# Patient Record
Sex: FEMALE | Race: BLACK OR AFRICAN AMERICAN | NOT HISPANIC OR LATINO | Employment: OTHER | URBAN - METROPOLITAN AREA
[De-identification: names, ages, dates, MRNs, and addresses within clinical notes are randomized per-mention and may not be internally consistent; named-entity substitution may affect disease eponyms.]

---

## 2017-01-01 ENCOUNTER — HOSPITAL ENCOUNTER (INPATIENT)
Facility: HOSPITAL | Age: 72
LOS: 13 days | Discharge: NURSING, REHAB OR LONG-TERM CARE | DRG: 196 | End: 2017-10-26
Attending: INTERNAL MEDICINE | Admitting: INTERNAL MEDICINE
Payer: MEDICARE

## 2017-01-01 ENCOUNTER — SURGERY (OUTPATIENT)
Age: 72
End: 2017-01-01

## 2017-01-01 ENCOUNTER — ANESTHESIA EVENT (OUTPATIENT)
Dept: ENDOSCOPY | Facility: HOSPITAL | Age: 72
DRG: 196 | End: 2017-01-01
Payer: MEDICARE

## 2017-01-01 ENCOUNTER — ANESTHESIA (OUTPATIENT)
Dept: ENDOSCOPY | Facility: HOSPITAL | Age: 72
DRG: 196 | End: 2017-01-01
Payer: MEDICARE

## 2017-01-01 ENCOUNTER — PATIENT OUTREACH (OUTPATIENT)
Dept: ADMINISTRATIVE | Facility: CLINIC | Age: 72
End: 2017-01-01

## 2017-01-01 ENCOUNTER — PATIENT OUTREACH (OUTPATIENT)
Dept: ADMINISTRATIVE | Facility: CLINIC | Age: 72
End: 2017-01-01
Payer: MEDICARE

## 2017-01-01 ENCOUNTER — HOSPITAL ENCOUNTER (INPATIENT)
Facility: HOSPITAL | Age: 72
LOS: 2 days | Discharge: HOME HEALTH | DRG: 280 | End: 2017-06-02
Attending: INTERNAL MEDICINE | Admitting: INTERNAL MEDICINE
Payer: MEDICARE

## 2017-01-01 ENCOUNTER — CLINICAL SUPPORT (OUTPATIENT)
Dept: CARDIOLOGY | Facility: CLINIC | Age: 72
DRG: 280 | End: 2017-01-01
Attending: INTERNAL MEDICINE
Payer: MEDICARE

## 2017-01-01 ENCOUNTER — HOSPITAL ENCOUNTER (EMERGENCY)
Facility: HOSPITAL | Age: 72
End: 2017-10-27
Attending: INTERNAL MEDICINE | Admitting: INTERNAL MEDICINE
Payer: MEDICARE

## 2017-01-01 VITALS
OXYGEN SATURATION: 99 % | SYSTOLIC BLOOD PRESSURE: 93 MMHG | RESPIRATION RATE: 30 BRPM | WEIGHT: 220.44 LBS | HEART RATE: 36 BPM | BODY MASS INDEX: 36.69 KG/M2 | DIASTOLIC BLOOD PRESSURE: 52 MMHG

## 2017-01-01 VITALS
HEART RATE: 78 BPM | DIASTOLIC BLOOD PRESSURE: 56 MMHG | WEIGHT: 188.5 LBS | SYSTOLIC BLOOD PRESSURE: 127 MMHG | HEIGHT: 65 IN | RESPIRATION RATE: 27 BRPM | OXYGEN SATURATION: 95 % | BODY MASS INDEX: 31.4 KG/M2 | TEMPERATURE: 99 F

## 2017-01-01 VITALS
SYSTOLIC BLOOD PRESSURE: 155 MMHG | WEIGHT: 189.81 LBS | OXYGEN SATURATION: 95 % | BODY MASS INDEX: 31.63 KG/M2 | HEIGHT: 65 IN | TEMPERATURE: 98 F | RESPIRATION RATE: 42 BRPM | HEART RATE: 75 BPM | DIASTOLIC BLOOD PRESSURE: 68 MMHG

## 2017-01-01 DIAGNOSIS — I10 ESSENTIAL HYPERTENSION: ICD-10-CM

## 2017-01-01 DIAGNOSIS — K92.1 MELENA: Primary | ICD-10-CM

## 2017-01-01 DIAGNOSIS — I25.10 CAD (CORONARY ARTERY DISEASE): Primary | ICD-10-CM

## 2017-01-01 DIAGNOSIS — Z79.4 TYPE 2 DIABETES MELLITUS WITH STAGE 3 CHRONIC KIDNEY DISEASE, WITH LONG-TERM CURRENT USE OF INSULIN: ICD-10-CM

## 2017-01-01 DIAGNOSIS — J96.90 RESPIRATORY FAILURE: ICD-10-CM

## 2017-01-01 DIAGNOSIS — R33.9 URINARY RETENTION: ICD-10-CM

## 2017-01-01 DIAGNOSIS — I46.9 CARDIAC ARREST: ICD-10-CM

## 2017-01-01 DIAGNOSIS — Z79.4 TYPE 2 DIABETES MELLITUS WITH HYPERGLYCEMIA, WITH LONG-TERM CURRENT USE OF INSULIN: ICD-10-CM

## 2017-01-01 DIAGNOSIS — J96.01 ACUTE RESPIRATORY FAILURE WITH HYPOXIA: ICD-10-CM

## 2017-01-01 DIAGNOSIS — E11.01 HYPERGLYCEMIC HYPEROSMOLAR NONKETOTIC COMA: ICD-10-CM

## 2017-01-01 DIAGNOSIS — I25.10 CORONARY ARTERY DISEASE INVOLVING NATIVE CORONARY ARTERY OF NATIVE HEART WITHOUT ANGINA PECTORIS: ICD-10-CM

## 2017-01-01 DIAGNOSIS — D50.9 MICROCYTIC ANEMIA: ICD-10-CM

## 2017-01-01 DIAGNOSIS — R79.89 ELEVATED TROPONIN: ICD-10-CM

## 2017-01-01 DIAGNOSIS — J96.21 ACUTE ON CHRONIC RESPIRATORY FAILURE WITH HYPOXIA: ICD-10-CM

## 2017-01-01 DIAGNOSIS — N18.30 TYPE 2 DIABETES MELLITUS WITH STAGE 3 CHRONIC KIDNEY DISEASE, WITH LONG-TERM CURRENT USE OF INSULIN: ICD-10-CM

## 2017-01-01 DIAGNOSIS — R07.9 CHEST PAIN: ICD-10-CM

## 2017-01-01 DIAGNOSIS — I20.89 ANGINAL CHEST PAIN AT REST: ICD-10-CM

## 2017-01-01 DIAGNOSIS — K92.0 HEMATEMESIS, PRESENCE OF NAUSEA NOT SPECIFIED: Primary | ICD-10-CM

## 2017-01-01 DIAGNOSIS — E11.65 TYPE 2 DIABETES MELLITUS WITH HYPERGLYCEMIA, WITH LONG-TERM CURRENT USE OF INSULIN: ICD-10-CM

## 2017-01-01 DIAGNOSIS — E78.5 HYPERLIPIDEMIA, UNSPECIFIED HYPERLIPIDEMIA TYPE: ICD-10-CM

## 2017-01-01 DIAGNOSIS — I25.10 CAD (CORONARY ARTERY DISEASE): ICD-10-CM

## 2017-01-01 DIAGNOSIS — E11.22 TYPE 2 DIABETES MELLITUS WITH STAGE 3 CHRONIC KIDNEY DISEASE, WITH LONG-TERM CURRENT USE OF INSULIN: ICD-10-CM

## 2017-01-01 DIAGNOSIS — R07.89 CHEST HEAVINESS: ICD-10-CM

## 2017-01-01 DIAGNOSIS — R10.13 EPIGASTRIC CRAMPING: ICD-10-CM

## 2017-01-01 DIAGNOSIS — I50.33 ACUTE ON CHRONIC DIASTOLIC HEART FAILURE: ICD-10-CM

## 2017-01-01 DIAGNOSIS — G93.41 ACUTE METABOLIC ENCEPHALOPATHY: ICD-10-CM

## 2017-01-01 DIAGNOSIS — R09.2 RESPIRATORY ARREST: ICD-10-CM

## 2017-01-01 LAB
ABO + RH BLD: NORMAL
ABO + RH BLD: NORMAL
ALBUMIN SERPL BCP-MCNC: 2.1 G/DL
ALBUMIN SERPL BCP-MCNC: 2.2 G/DL
ALBUMIN SERPL BCP-MCNC: 2.3 G/DL
ALBUMIN SERPL BCP-MCNC: 2.8 G/DL
ALBUMIN SERPL BCP-MCNC: 2.9 G/DL
ALBUMIN SERPL BCP-MCNC: 2.9 G/DL
ALBUMIN SERPL BCP-MCNC: 3 G/DL
ALLENS TEST: ABNORMAL
ALP SERPL-CCNC: 101 U/L
ALP SERPL-CCNC: 114 U/L
ALP SERPL-CCNC: 115 U/L
ALP SERPL-CCNC: 260 U/L
ALP SERPL-CCNC: 260 U/L
ALP SERPL-CCNC: 272 U/L
ALP SERPL-CCNC: 273 U/L
ALP SERPL-CCNC: 290 U/L
ALP SERPL-CCNC: 291 U/L
ALP SERPL-CCNC: 293 U/L
ALP SERPL-CCNC: 303 U/L
ALP SERPL-CCNC: 315 U/L
ALT SERPL W/O P-5'-P-CCNC: 18 U/L
ALT SERPL W/O P-5'-P-CCNC: 21 U/L
ALT SERPL W/O P-5'-P-CCNC: 32 U/L
ALT SERPL W/O P-5'-P-CCNC: 33 U/L
ALT SERPL W/O P-5'-P-CCNC: 34 U/L
ALT SERPL W/O P-5'-P-CCNC: 35 U/L
ALT SERPL W/O P-5'-P-CCNC: 36 U/L
ALT SERPL W/O P-5'-P-CCNC: 38 U/L
ALT SERPL W/O P-5'-P-CCNC: 49 U/L
ALT SERPL W/O P-5'-P-CCNC: 52 U/L
ALT SERPL W/O P-5'-P-CCNC: 61 U/L
ALT SERPL W/O P-5'-P-CCNC: 62 U/L
ANION GAP SERPL CALC-SCNC: 10 MMOL/L
ANION GAP SERPL CALC-SCNC: 10 MMOL/L
ANION GAP SERPL CALC-SCNC: 11 MMOL/L
ANION GAP SERPL CALC-SCNC: 11 MMOL/L
ANION GAP SERPL CALC-SCNC: 12 MMOL/L
ANION GAP SERPL CALC-SCNC: 13 MMOL/L
ANION GAP SERPL CALC-SCNC: 14 MMOL/L
ANION GAP SERPL CALC-SCNC: 15 MMOL/L
ANION GAP SERPL CALC-SCNC: 16 MMOL/L
ANION GAP SERPL CALC-SCNC: 17 MMOL/L
ANION GAP SERPL CALC-SCNC: 18 MMOL/L
ANION GAP SERPL CALC-SCNC: 19 MMOL/L
ANION GAP SERPL CALC-SCNC: 7 MMOL/L
ANION GAP SERPL CALC-SCNC: 7 MMOL/L
ANION GAP SERPL CALC-SCNC: 8 MMOL/L
ANION GAP SERPL CALC-SCNC: 9 MMOL/L
ANION GAP SERPL CALC-SCNC: 9 MMOL/L
ANISOCYTOSIS BLD QL SMEAR: SLIGHT
APTT BLDCRRT: 25.3 SEC
AST SERPL-CCNC: 18 U/L
AST SERPL-CCNC: 20 U/L
AST SERPL-CCNC: 26 U/L
AST SERPL-CCNC: 30 U/L
AST SERPL-CCNC: 32 U/L
AST SERPL-CCNC: 42 U/L
AST SERPL-CCNC: 43 U/L
AST SERPL-CCNC: 49 U/L
AST SERPL-CCNC: 49 U/L
AST SERPL-CCNC: 57 U/L
AST SERPL-CCNC: 57 U/L
AST SERPL-CCNC: 73 U/L
B-OH-BUTYR BLD STRIP-SCNC: 0 MMOL/L
B-OH-BUTYR BLD STRIP-SCNC: 0 MMOL/L
BACTERIA #/AREA URNS AUTO: ABNORMAL /HPF
BACTERIA BLD CULT: NORMAL
BACTERIA BLD CULT: NORMAL
BACTERIA SPEC AEROBE CULT: NORMAL
BASOPHILS # BLD AUTO: 0.01 K/UL
BASOPHILS # BLD AUTO: 0.02 K/UL
BASOPHILS # BLD AUTO: 0.03 K/UL
BASOPHILS # BLD AUTO: 0.04 K/UL
BASOPHILS # BLD AUTO: 0.05 K/UL
BASOPHILS # BLD AUTO: ABNORMAL K/UL
BASOPHILS NFR BLD: 0 %
BASOPHILS NFR BLD: 0.1 %
BASOPHILS NFR BLD: 0.2 %
BILIRUB SERPL-MCNC: 0.2 MG/DL
BILIRUB SERPL-MCNC: 0.3 MG/DL
BILIRUB SERPL-MCNC: 0.4 MG/DL
BILIRUB SERPL-MCNC: 0.5 MG/DL
BILIRUB SERPL-MCNC: 0.5 MG/DL
BILIRUB SERPL-MCNC: 0.6 MG/DL
BILIRUB SERPL-MCNC: 0.7 MG/DL
BILIRUB SERPL-MCNC: 0.9 MG/DL
BILIRUB SERPL-MCNC: 1.1 MG/DL
BILIRUB SERPL-MCNC: 1.1 MG/DL
BILIRUB SERPL-MCNC: 1.3 MG/DL
BILIRUB SERPL-MCNC: 1.4 MG/DL
BILIRUB UR QL STRIP: NEGATIVE
BLD GP AB SCN CELLS X3 SERPL QL: NORMAL
BLD GP AB SCN CELLS X3 SERPL QL: NORMAL
BLD PROD TYP BPU: NORMAL
BLOOD UNIT EXPIRATION DATE: NORMAL
BLOOD UNIT TYPE CODE: 5100
BLOOD UNIT TYPE CODE: 6200
BLOOD UNIT TYPE CODE: 8400
BLOOD UNIT TYPE CODE: 8400
BLOOD UNIT TYPE CODE: 9500
BLOOD UNIT TYPE: NORMAL
BNP SERPL-MCNC: 1943 PG/ML
BNP SERPL-MCNC: 243 PG/ML
BUN SERPL-MCNC: 100 MG/DL
BUN SERPL-MCNC: 107 MG/DL
BUN SERPL-MCNC: 111 MG/DL
BUN SERPL-MCNC: 111 MG/DL
BUN SERPL-MCNC: 112 MG/DL
BUN SERPL-MCNC: 113 MG/DL
BUN SERPL-MCNC: 40 MG/DL
BUN SERPL-MCNC: 45 MG/DL
BUN SERPL-MCNC: 50 MG/DL
BUN SERPL-MCNC: 51 MG/DL
BUN SERPL-MCNC: 52 MG/DL
BUN SERPL-MCNC: 53 MG/DL
BUN SERPL-MCNC: 55 MG/DL
BUN SERPL-MCNC: 59 MG/DL
BUN SERPL-MCNC: 59 MG/DL
BUN SERPL-MCNC: 60 MG/DL
BUN SERPL-MCNC: 61 MG/DL
BUN SERPL-MCNC: 61 MG/DL
BUN SERPL-MCNC: 63 MG/DL
BUN SERPL-MCNC: 69 MG/DL
BUN SERPL-MCNC: 70 MG/DL
BUN SERPL-MCNC: 74 MG/DL
BUN SERPL-MCNC: 78 MG/DL
BUN SERPL-MCNC: 79 MG/DL
BUN SERPL-MCNC: 81 MG/DL
BUN SERPL-MCNC: 82 MG/DL
BUN SERPL-MCNC: 86 MG/DL
BUN SERPL-MCNC: 88 MG/DL
BUN SERPL-MCNC: 91 MG/DL
BUN SERPL-MCNC: 92 MG/DL
BUN SERPL-MCNC: 95 MG/DL
CALCIUM SERPL-MCNC: 10 MG/DL
CALCIUM SERPL-MCNC: 10.1 MG/DL
CALCIUM SERPL-MCNC: 10.1 MG/DL
CALCIUM SERPL-MCNC: 10.2 MG/DL
CALCIUM SERPL-MCNC: 10.3 MG/DL
CALCIUM SERPL-MCNC: 10.4 MG/DL
CALCIUM SERPL-MCNC: 10.4 MG/DL
CALCIUM SERPL-MCNC: 10.6 MG/DL
CALCIUM SERPL-MCNC: 7.7 MG/DL
CALCIUM SERPL-MCNC: 7.8 MG/DL
CALCIUM SERPL-MCNC: 7.9 MG/DL
CALCIUM SERPL-MCNC: 8 MG/DL
CALCIUM SERPL-MCNC: 8.1 MG/DL
CALCIUM SERPL-MCNC: 8.1 MG/DL
CALCIUM SERPL-MCNC: 8.4 MG/DL
CALCIUM SERPL-MCNC: 8.4 MG/DL
CALCIUM SERPL-MCNC: 8.5 MG/DL
CALCIUM SERPL-MCNC: 8.6 MG/DL
CALCIUM SERPL-MCNC: 8.8 MG/DL
CALCIUM SERPL-MCNC: 9 MG/DL
CALCIUM SERPL-MCNC: 9.3 MG/DL
CALCIUM SERPL-MCNC: 9.4 MG/DL
CALCIUM SERPL-MCNC: 9.6 MG/DL
CALCIUM SERPL-MCNC: 9.6 MG/DL
CALCIUM SERPL-MCNC: 9.8 MG/DL
CALCIUM SERPL-MCNC: 9.8 MG/DL
CHLORIDE SERPL-SCNC: 100 MMOL/L
CHLORIDE SERPL-SCNC: 101 MMOL/L
CHLORIDE SERPL-SCNC: 102 MMOL/L
CHLORIDE SERPL-SCNC: 103 MMOL/L
CHLORIDE SERPL-SCNC: 104 MMOL/L
CHLORIDE SERPL-SCNC: 105 MMOL/L
CHLORIDE SERPL-SCNC: 106 MMOL/L
CHLORIDE SERPL-SCNC: 107 MMOL/L
CHLORIDE SERPL-SCNC: 108 MMOL/L
CHLORIDE SERPL-SCNC: 108 MMOL/L
CHLORIDE SERPL-SCNC: 109 MMOL/L
CHLORIDE SERPL-SCNC: 111 MMOL/L
CHLORIDE SERPL-SCNC: 112 MMOL/L
CHLORIDE SERPL-SCNC: 112 MMOL/L
CHLORIDE SERPL-SCNC: 113 MMOL/L
CHLORIDE SERPL-SCNC: 98 MMOL/L
CHOLEST/HDLC SERPL: 2.3 {RATIO}
CLARITY UR REFRACT.AUTO: ABNORMAL
CO2 SERPL-SCNC: 21 MMOL/L
CO2 SERPL-SCNC: 21 MMOL/L
CO2 SERPL-SCNC: 23 MMOL/L
CO2 SERPL-SCNC: 24 MMOL/L
CO2 SERPL-SCNC: 25 MMOL/L
CO2 SERPL-SCNC: 26 MMOL/L
CO2 SERPL-SCNC: 27 MMOL/L
CO2 SERPL-SCNC: 28 MMOL/L
CO2 SERPL-SCNC: 29 MMOL/L
CO2 SERPL-SCNC: 30 MMOL/L
CO2 SERPL-SCNC: 31 MMOL/L
CO2 SERPL-SCNC: 31 MMOL/L
CO2 SERPL-SCNC: 32 MMOL/L
CO2 SERPL-SCNC: 32 MMOL/L
CODING SYSTEM: NORMAL
COLOR UR AUTO: YELLOW
CREAT SERPL-MCNC: 1.6 MG/DL
CREAT SERPL-MCNC: 1.7 MG/DL
CREAT SERPL-MCNC: 1.8 MG/DL
CREAT SERPL-MCNC: 1.9 MG/DL
CREAT SERPL-MCNC: 2 MG/DL
CREAT SERPL-MCNC: 2.1 MG/DL
CREAT SERPL-MCNC: 2.2 MG/DL
CREAT SERPL-MCNC: 2.3 MG/DL
CREAT SERPL-MCNC: 2.4 MG/DL
CREAT SERPL-MCNC: 2.5 MG/DL
CREAT SERPL-MCNC: 2.5 MG/DL
CREAT SERPL-MCNC: 2.6 MG/DL
CREAT SERPL-MCNC: 2.8 MG/DL
CREAT SERPL-MCNC: 2.8 MG/DL
CREAT SERPL-MCNC: 2.9 MG/DL
CREAT SERPL-MCNC: 3 MG/DL
CREAT SERPL-MCNC: 3.1 MG/DL
CREAT UR-MCNC: 64 MG/DL
CRP SERPL-MCNC: 358.57 MG/L
DELSYS: ABNORMAL
DIASTOLIC DYSFUNCTION: YES
DIFFERENTIAL METHOD: ABNORMAL
DISPENSE STATUS: NORMAL
EOSINOPHIL # BLD AUTO: 0 K/UL
EOSINOPHIL # BLD AUTO: 0.2 K/UL
EOSINOPHIL # BLD AUTO: 0.4 K/UL
EOSINOPHIL # BLD AUTO: 1.2 K/UL
EOSINOPHIL # BLD AUTO: ABNORMAL K/UL
EOSINOPHIL NFR BLD: 0 %
EOSINOPHIL NFR BLD: 0.1 %
EOSINOPHIL NFR BLD: 1.1 %
EOSINOPHIL NFR BLD: 1.4 %
EOSINOPHIL NFR BLD: 1.5 %
EOSINOPHIL NFR BLD: 2.3 %
EOSINOPHIL NFR BLD: 3 %
EOSINOPHIL NFR BLD: 7.2 %
EP: 5
ERYTHROCYTE [DISTWIDTH] IN BLOOD BY AUTOMATED COUNT: 14.3 %
ERYTHROCYTE [DISTWIDTH] IN BLOOD BY AUTOMATED COUNT: 14.4 %
ERYTHROCYTE [DISTWIDTH] IN BLOOD BY AUTOMATED COUNT: 14.8 %
ERYTHROCYTE [DISTWIDTH] IN BLOOD BY AUTOMATED COUNT: 17.1 %
ERYTHROCYTE [DISTWIDTH] IN BLOOD BY AUTOMATED COUNT: 17.5 %
ERYTHROCYTE [DISTWIDTH] IN BLOOD BY AUTOMATED COUNT: 17.6 %
ERYTHROCYTE [DISTWIDTH] IN BLOOD BY AUTOMATED COUNT: 17.7 %
ERYTHROCYTE [DISTWIDTH] IN BLOOD BY AUTOMATED COUNT: 17.7 %
ERYTHROCYTE [DISTWIDTH] IN BLOOD BY AUTOMATED COUNT: 17.8 %
ERYTHROCYTE [DISTWIDTH] IN BLOOD BY AUTOMATED COUNT: 17.9 %
ERYTHROCYTE [DISTWIDTH] IN BLOOD BY AUTOMATED COUNT: 18 %
ERYTHROCYTE [DISTWIDTH] IN BLOOD BY AUTOMATED COUNT: 18.1 %
ERYTHROCYTE [DISTWIDTH] IN BLOOD BY AUTOMATED COUNT: 18.1 %
ERYTHROCYTE [DISTWIDTH] IN BLOOD BY AUTOMATED COUNT: 18.4 %
ERYTHROCYTE [DISTWIDTH] IN BLOOD BY AUTOMATED COUNT: 18.4 %
ERYTHROCYTE [DISTWIDTH] IN BLOOD BY AUTOMATED COUNT: 18.5 %
ERYTHROCYTE [DISTWIDTH] IN BLOOD BY AUTOMATED COUNT: 18.6 %
ERYTHROCYTE [DISTWIDTH] IN BLOOD BY AUTOMATED COUNT: 19.9 %
ERYTHROCYTE [SEDIMENTATION RATE] IN BLOOD BY WESTERGREN METHOD: 12 MM/H
ERYTHROCYTE [SEDIMENTATION RATE] IN BLOOD BY WESTERGREN METHOD: 12 MM/H
ERYTHROCYTE [SEDIMENTATION RATE] IN BLOOD BY WESTERGREN METHOD: 16 MM/H
ERYTHROCYTE [SEDIMENTATION RATE] IN BLOOD BY WESTERGREN METHOD: 20 MM/H
ERYTHROCYTE [SEDIMENTATION RATE] IN BLOOD BY WESTERGREN METHOD: >150 MM/HR
EST. GFR  (AFRICAN AMERICAN): 16.8 ML/MIN/1.73 M^2
EST. GFR  (AFRICAN AMERICAN): 17.5 ML/MIN/1.73 M^2
EST. GFR  (AFRICAN AMERICAN): 18.2 ML/MIN/1.73 M^2
EST. GFR  (AFRICAN AMERICAN): 19 ML/MIN/1.73 M^2
EST. GFR  (AFRICAN AMERICAN): 19 ML/MIN/1.73 M^2
EST. GFR  (AFRICAN AMERICAN): 20.8 ML/MIN/1.73 M^2
EST. GFR  (AFRICAN AMERICAN): 21.8 ML/MIN/1.73 M^2
EST. GFR  (AFRICAN AMERICAN): 21.8 ML/MIN/1.73 M^2
EST. GFR  (AFRICAN AMERICAN): 22.9 ML/MIN/1.73 M^2
EST. GFR  (AFRICAN AMERICAN): 24.1 ML/MIN/1.73 M^2
EST. GFR  (AFRICAN AMERICAN): 25.4 ML/MIN/1.73 M^2
EST. GFR  (AFRICAN AMERICAN): 26.9 ML/MIN/1.73 M^2
EST. GFR  (AFRICAN AMERICAN): 28.5 ML/MIN/1.73 M^2
EST. GFR  (AFRICAN AMERICAN): 30.4 ML/MIN/1.73 M^2
EST. GFR  (AFRICAN AMERICAN): 32.4 ML/MIN/1.73 M^2
EST. GFR  (AFRICAN AMERICAN): 34.7 ML/MIN/1.73 M^2
EST. GFR  (AFRICAN AMERICAN): 37.4 ML/MIN/1.73 M^2
EST. GFR  (NON AFRICAN AMERICAN): 14.6 ML/MIN/1.73 M^2
EST. GFR  (NON AFRICAN AMERICAN): 15.2 ML/MIN/1.73 M^2
EST. GFR  (NON AFRICAN AMERICAN): 15.8 ML/MIN/1.73 M^2
EST. GFR  (NON AFRICAN AMERICAN): 16.5 ML/MIN/1.73 M^2
EST. GFR  (NON AFRICAN AMERICAN): 16.5 ML/MIN/1.73 M^2
EST. GFR  (NON AFRICAN AMERICAN): 18 ML/MIN/1.73 M^2
EST. GFR  (NON AFRICAN AMERICAN): 18.9 ML/MIN/1.73 M^2
EST. GFR  (NON AFRICAN AMERICAN): 18.9 ML/MIN/1.73 M^2
EST. GFR  (NON AFRICAN AMERICAN): 19.9 ML/MIN/1.73 M^2
EST. GFR  (NON AFRICAN AMERICAN): 20.9 ML/MIN/1.73 M^2
EST. GFR  (NON AFRICAN AMERICAN): 22.1 ML/MIN/1.73 M^2
EST. GFR  (NON AFRICAN AMERICAN): 23.3 ML/MIN/1.73 M^2
EST. GFR  (NON AFRICAN AMERICAN): 24.8 ML/MIN/1.73 M^2
EST. GFR  (NON AFRICAN AMERICAN): 26.3 ML/MIN/1.73 M^2
EST. GFR  (NON AFRICAN AMERICAN): 28.1 ML/MIN/1.73 M^2
EST. GFR  (NON AFRICAN AMERICAN): 30.1 ML/MIN/1.73 M^2
EST. GFR  (NON AFRICAN AMERICAN): 32.4 ML/MIN/1.73 M^2
ESTIMATED AVG GLUCOSE: 151 MG/DL
ESTIMATED PA SYSTOLIC PRESSURE: 36
ESTIMATED PA SYSTOLIC PRESSURE: 52
FACT X PPP CHRO-ACNC: 1.66 IU/ML
FERRITIN SERPL-MCNC: 1334 NG/ML
FIO2: 100
FIO2: 100
FIO2: 50
FIO2: 90
GIANT PLATELETS BLD QL SMEAR: PRESENT
GLUCOSE SERPL-MCNC: 111 MG/DL
GLUCOSE SERPL-MCNC: 124 MG/DL
GLUCOSE SERPL-MCNC: 124 MG/DL
GLUCOSE SERPL-MCNC: 126 MG/DL
GLUCOSE SERPL-MCNC: 142 MG/DL
GLUCOSE SERPL-MCNC: 158 MG/DL
GLUCOSE SERPL-MCNC: 160 MG/DL
GLUCOSE SERPL-MCNC: 171 MG/DL
GLUCOSE SERPL-MCNC: 175 MG/DL
GLUCOSE SERPL-MCNC: 177 MG/DL
GLUCOSE SERPL-MCNC: 193 MG/DL
GLUCOSE SERPL-MCNC: 201 MG/DL
GLUCOSE SERPL-MCNC: 201 MG/DL
GLUCOSE SERPL-MCNC: 211 MG/DL
GLUCOSE SERPL-MCNC: 226 MG/DL
GLUCOSE SERPL-MCNC: 226 MG/DL
GLUCOSE SERPL-MCNC: 228 MG/DL
GLUCOSE SERPL-MCNC: 230 MG/DL
GLUCOSE SERPL-MCNC: 230 MG/DL
GLUCOSE SERPL-MCNC: 25 MG/DL
GLUCOSE SERPL-MCNC: 252 MG/DL
GLUCOSE SERPL-MCNC: 271 MG/DL
GLUCOSE SERPL-MCNC: 278 MG/DL
GLUCOSE SERPL-MCNC: 285 MG/DL
GLUCOSE SERPL-MCNC: 285 MG/DL
GLUCOSE SERPL-MCNC: 292 MG/DL
GLUCOSE SERPL-MCNC: 293 MG/DL
GLUCOSE SERPL-MCNC: 345 MG/DL (ref 70–110)
GLUCOSE SERPL-MCNC: 349 MG/DL
GLUCOSE SERPL-MCNC: 378 MG/DL
GLUCOSE SERPL-MCNC: 383 MG/DL
GLUCOSE SERPL-MCNC: 392 MG/DL
GLUCOSE SERPL-MCNC: 433 MG/DL
GLUCOSE SERPL-MCNC: 459 MG/DL
GLUCOSE SERPL-MCNC: 481 MG/DL
GLUCOSE SERPL-MCNC: 489 MG/DL
GLUCOSE SERPL-MCNC: 489 MG/DL
GLUCOSE SERPL-MCNC: 549 MG/DL
GLUCOSE SERPL-MCNC: 558 MG/DL
GLUCOSE UR QL STRIP: ABNORMAL
GRAM STN SPEC: NORMAL
HBA1C MFR BLD HPLC: 6.9 %
HCO3 UR-SCNC: 12.4 MMOL/L (ref 24–28)
HCO3 UR-SCNC: 16.5 MMOL/L (ref 24–28)
HCO3 UR-SCNC: 22.7 MMOL/L (ref 24–28)
HCO3 UR-SCNC: 24.6 MMOL/L (ref 24–28)
HCO3 UR-SCNC: 25.6 MMOL/L (ref 24–28)
HCT VFR BLD AUTO: 20.5 %
HCT VFR BLD AUTO: 21.8 %
HCT VFR BLD AUTO: 21.9 %
HCT VFR BLD AUTO: 22.6 %
HCT VFR BLD AUTO: 22.8 %
HCT VFR BLD AUTO: 23.3 %
HCT VFR BLD AUTO: 23.9 %
HCT VFR BLD AUTO: 24.4 %
HCT VFR BLD AUTO: 25.1 %
HCT VFR BLD AUTO: 25.1 %
HCT VFR BLD AUTO: 25.2 %
HCT VFR BLD AUTO: 25.6 %
HCT VFR BLD AUTO: 27.1 %
HCT VFR BLD AUTO: 28.1 %
HCT VFR BLD AUTO: 28.2 %
HCT VFR BLD AUTO: 28.3 %
HCT VFR BLD AUTO: 29.4 %
HCT VFR BLD AUTO: 30 %
HCT VFR BLD AUTO: 31.6 %
HCT VFR BLD AUTO: 32.2 %
HCT VFR BLD CALC: 17 %PCV (ref 36–54)
HDL/CHOLESTEROL RATIO: 43.8 %
HDLC SERPL-MCNC: 160 MG/DL
HDLC SERPL-MCNC: 70 MG/DL
HGB BLD-MCNC: 10.7 G/DL
HGB BLD-MCNC: 10.9 G/DL
HGB BLD-MCNC: 6.9 G/DL
HGB BLD-MCNC: 7.2 G/DL
HGB BLD-MCNC: 7.4 G/DL
HGB BLD-MCNC: 7.6 G/DL
HGB BLD-MCNC: 7.6 G/DL
HGB BLD-MCNC: 7.8 G/DL
HGB BLD-MCNC: 8.1 G/DL
HGB BLD-MCNC: 8.1 G/DL
HGB BLD-MCNC: 8.4 G/DL
HGB BLD-MCNC: 8.4 G/DL
HGB BLD-MCNC: 8.5 G/DL
HGB BLD-MCNC: 8.6 G/DL
HGB BLD-MCNC: 8.7 G/DL
HGB BLD-MCNC: 9.1 G/DL
HGB BLD-MCNC: 9.2 G/DL
HGB BLD-MCNC: 9.6 G/DL
HGB BLD-MCNC: 9.7 G/DL
HGB BLD-MCNC: 9.9 G/DL
HGB UR QL STRIP: ABNORMAL
HIV 1+2 AB+HIV1 P24 AG SERPL QL IA: NEGATIVE
HYALINE CASTS UR QL AUTO: 6 /LPF
HYPOCHROMIA BLD QL SMEAR: ABNORMAL
IMM GRANULOCYTES # BLD AUTO: 0.24 K/UL
IMM GRANULOCYTES # BLD AUTO: 0.31 K/UL
IMM GRANULOCYTES # BLD AUTO: 0.32 K/UL
IMM GRANULOCYTES # BLD AUTO: 0.35 K/UL
IMM GRANULOCYTES # BLD AUTO: 0.47 K/UL
IMM GRANULOCYTES # BLD AUTO: 0.58 K/UL
IMM GRANULOCYTES # BLD AUTO: 0.69 K/UL
IMM GRANULOCYTES # BLD AUTO: 0.71 K/UL
IMM GRANULOCYTES # BLD AUTO: 0.72 K/UL
IMM GRANULOCYTES # BLD AUTO: 0.76 K/UL
IMM GRANULOCYTES # BLD AUTO: ABNORMAL K/UL
IMM GRANULOCYTES NFR BLD AUTO: 1.1 %
IMM GRANULOCYTES NFR BLD AUTO: 1.4 %
IMM GRANULOCYTES NFR BLD AUTO: 1.6 %
IMM GRANULOCYTES NFR BLD AUTO: 2 %
IMM GRANULOCYTES NFR BLD AUTO: 2.8 %
IMM GRANULOCYTES NFR BLD AUTO: 2.8 %
IMM GRANULOCYTES NFR BLD AUTO: 2.9 %
IMM GRANULOCYTES NFR BLD AUTO: 3 %
IMM GRANULOCYTES NFR BLD AUTO: 3.1 %
IMM GRANULOCYTES NFR BLD AUTO: ABNORMAL %
INR PPP: 1
INR PPP: 1.1
INR PPP: 1.2
IP: 15
IRON SERPL-MCNC: 38 UG/DL
KETONES UR QL STRIP: NEGATIVE
L PNEUMO AG UR QL IA: NOT DETECTED
LACTATE SERPL-SCNC: 1.2 MMOL/L
LACTATE SERPL-SCNC: 1.4 MMOL/L
LACTATE SERPL-SCNC: 1.7 MMOL/L
LDLC SERPL CALC-MCNC: 72.6 MG/DL
LEUKOCYTE ESTERASE UR QL STRIP: ABNORMAL
LIPASE SERPL-CCNC: 10 U/L
LIPASE SERPL-CCNC: 19 U/L
LIPASE SERPL-CCNC: 4 U/L
LYMPHOCYTES # BLD AUTO: 0.9 K/UL
LYMPHOCYTES # BLD AUTO: 1 K/UL
LYMPHOCYTES # BLD AUTO: 1.4 K/UL
LYMPHOCYTES # BLD AUTO: 1.5 K/UL
LYMPHOCYTES # BLD AUTO: 1.7 K/UL
LYMPHOCYTES # BLD AUTO: 1.8 K/UL
LYMPHOCYTES # BLD AUTO: 1.8 K/UL
LYMPHOCYTES # BLD AUTO: 1.9 K/UL
LYMPHOCYTES # BLD AUTO: 1.9 K/UL
LYMPHOCYTES # BLD AUTO: 2 K/UL
LYMPHOCYTES # BLD AUTO: 2 K/UL
LYMPHOCYTES # BLD AUTO: 2.1 K/UL
LYMPHOCYTES # BLD AUTO: 2.1 K/UL
LYMPHOCYTES # BLD AUTO: 2.3 K/UL
LYMPHOCYTES # BLD AUTO: 2.3 K/UL
LYMPHOCYTES # BLD AUTO: 2.5 K/UL
LYMPHOCYTES # BLD AUTO: ABNORMAL K/UL
LYMPHOCYTES NFR BLD: 10.6 %
LYMPHOCYTES NFR BLD: 10.7 %
LYMPHOCYTES NFR BLD: 14.5 %
LYMPHOCYTES NFR BLD: 18.9 %
LYMPHOCYTES NFR BLD: 2 %
LYMPHOCYTES NFR BLD: 21.6 %
LYMPHOCYTES NFR BLD: 4.4 %
LYMPHOCYTES NFR BLD: 4.6 %
LYMPHOCYTES NFR BLD: 4.6 %
LYMPHOCYTES NFR BLD: 5.9 %
LYMPHOCYTES NFR BLD: 7.2 %
LYMPHOCYTES NFR BLD: 7.6 %
LYMPHOCYTES NFR BLD: 7.6 %
LYMPHOCYTES NFR BLD: 7.9 %
LYMPHOCYTES NFR BLD: 8.2 %
LYMPHOCYTES NFR BLD: 8.3 %
LYMPHOCYTES NFR BLD: 8.5 %
LYMPHOCYTES NFR BLD: 8.6 %
LYMPHOCYTES NFR BLD: 8.7 %
LYMPHOCYTES NFR BLD: 9.9 %
MAGNESIUM SERPL-MCNC: 1.7 MG/DL
MAGNESIUM SERPL-MCNC: 1.8 MG/DL
MAGNESIUM SERPL-MCNC: 1.9 MG/DL
MAGNESIUM SERPL-MCNC: 2.1 MG/DL
MAGNESIUM SERPL-MCNC: 2.2 MG/DL
MAGNESIUM SERPL-MCNC: 2.3 MG/DL
MAGNESIUM SERPL-MCNC: 2.3 MG/DL
MAGNESIUM SERPL-MCNC: 2.7 MG/DL
MCH RBC QN AUTO: 24.5 PG
MCH RBC QN AUTO: 24.7 PG
MCH RBC QN AUTO: 24.8 PG
MCH RBC QN AUTO: 25.2 PG
MCH RBC QN AUTO: 25.2 PG
MCH RBC QN AUTO: 25.3 PG
MCH RBC QN AUTO: 25.3 PG
MCH RBC QN AUTO: 25.4 PG
MCH RBC QN AUTO: 25.6 PG
MCH RBC QN AUTO: 25.6 PG
MCH RBC QN AUTO: 25.7 PG
MCH RBC QN AUTO: 25.7 PG
MCH RBC QN AUTO: 25.8 PG
MCH RBC QN AUTO: 25.8 PG
MCH RBC QN AUTO: 25.9 PG
MCH RBC QN AUTO: 26.3 PG
MCH RBC QN AUTO: 26.4 PG
MCH RBC QN AUTO: 26.9 PG
MCH RBC QN AUTO: 27 PG
MCH RBC QN AUTO: 27.2 PG
MCHC RBC AUTO-ENTMCNC: 31 G/DL
MCHC RBC AUTO-ENTMCNC: 32.3 G/DL
MCHC RBC AUTO-ENTMCNC: 32.7 G/DL
MCHC RBC AUTO-ENTMCNC: 32.7 G/DL
MCHC RBC AUTO-ENTMCNC: 33 %
MCHC RBC AUTO-ENTMCNC: 33 G/DL
MCHC RBC AUTO-ENTMCNC: 33.2 G/DL
MCHC RBC AUTO-ENTMCNC: 33.2 G/DL
MCHC RBC AUTO-ENTMCNC: 33.3 G/DL
MCHC RBC AUTO-ENTMCNC: 33.5 G/DL
MCHC RBC AUTO-ENTMCNC: 33.7 G/DL
MCHC RBC AUTO-ENTMCNC: 33.9 %
MCHC RBC AUTO-ENTMCNC: 33.9 %
MCHC RBC AUTO-ENTMCNC: 33.9 G/DL
MCHC RBC AUTO-ENTMCNC: 33.9 G/DL
MCHC RBC AUTO-ENTMCNC: 34.1 G/DL
MCHC RBC AUTO-ENTMCNC: 34.3 G/DL
MCHC RBC AUTO-ENTMCNC: 34.7 G/DL
MCV RBC AUTO: 74 FL
MCV RBC AUTO: 75 FL
MCV RBC AUTO: 76 FL
MCV RBC AUTO: 77 FL
MCV RBC AUTO: 78 FL
MCV RBC AUTO: 78 FL
MCV RBC AUTO: 80 FL
MCV RBC AUTO: 80 FL
MCV RBC AUTO: 81 FL
MCV RBC AUTO: 82 FL
MICROSCOPIC COMMENT: ABNORMAL
MITRAL VALVE MOBILITY: NORMAL
MITRAL VALVE REGURGITATION: ABNORMAL
MODE: ABNORMAL
MONOCYTES # BLD AUTO: 0.3 K/UL
MONOCYTES # BLD AUTO: 0.3 K/UL
MONOCYTES # BLD AUTO: 0.4 K/UL
MONOCYTES # BLD AUTO: 0.5 K/UL
MONOCYTES # BLD AUTO: 0.7 K/UL
MONOCYTES # BLD AUTO: 0.7 K/UL
MONOCYTES # BLD AUTO: 0.8 K/UL
MONOCYTES # BLD AUTO: 0.8 K/UL
MONOCYTES # BLD AUTO: 0.9 K/UL
MONOCYTES # BLD AUTO: 0.9 K/UL
MONOCYTES # BLD AUTO: 1 K/UL
MONOCYTES # BLD AUTO: 1 K/UL
MONOCYTES # BLD AUTO: 1.1 K/UL
MONOCYTES # BLD AUTO: 1.1 K/UL
MONOCYTES # BLD AUTO: 1.2 K/UL
MONOCYTES # BLD AUTO: 1.3 K/UL
MONOCYTES # BLD AUTO: 1.3 K/UL
MONOCYTES # BLD AUTO: 1.4 K/UL
MONOCYTES # BLD AUTO: 1.7 K/UL
MONOCYTES # BLD AUTO: ABNORMAL K/UL
MONOCYTES NFR BLD: 1 %
MONOCYTES NFR BLD: 10.3 %
MONOCYTES NFR BLD: 2 %
MONOCYTES NFR BLD: 2.7 %
MONOCYTES NFR BLD: 2.7 %
MONOCYTES NFR BLD: 3.6 %
MONOCYTES NFR BLD: 3.8 %
MONOCYTES NFR BLD: 3.9 %
MONOCYTES NFR BLD: 3.9 %
MONOCYTES NFR BLD: 4 %
MONOCYTES NFR BLD: 4.1 %
MONOCYTES NFR BLD: 4.6 %
MONOCYTES NFR BLD: 4.6 %
MONOCYTES NFR BLD: 4.7 %
MONOCYTES NFR BLD: 5.9 %
MONOCYTES NFR BLD: 6.2 %
MONOCYTES NFR BLD: 8.5 %
MONOCYTES NFR BLD: 9.5 %
MYELOCYTES NFR BLD MANUAL: 0.5 %
NEUTROPHILS # BLD AUTO: 11.1 K/UL
NEUTROPHILS # BLD AUTO: 11.4 K/UL
NEUTROPHILS # BLD AUTO: 11.7 K/UL
NEUTROPHILS # BLD AUTO: 13.3 K/UL
NEUTROPHILS # BLD AUTO: 18 K/UL
NEUTROPHILS # BLD AUTO: 18.3 K/UL
NEUTROPHILS # BLD AUTO: 18.5 K/UL
NEUTROPHILS # BLD AUTO: 18.6 K/UL
NEUTROPHILS # BLD AUTO: 19.1 K/UL
NEUTROPHILS # BLD AUTO: 19.9 K/UL
NEUTROPHILS # BLD AUTO: 19.9 K/UL
NEUTROPHILS # BLD AUTO: 20.1 K/UL
NEUTROPHILS # BLD AUTO: 20.2 K/UL
NEUTROPHILS # BLD AUTO: 20.3 K/UL
NEUTROPHILS # BLD AUTO: 21.6 K/UL
NEUTROPHILS # BLD AUTO: 22.3 K/UL
NEUTROPHILS # BLD AUTO: 7.6 K/UL
NEUTROPHILS # BLD AUTO: 9.7 K/UL
NEUTROPHILS # BLD AUTO: 9.8 K/UL
NEUTROPHILS NFR BLD: 71.3 %
NEUTROPHILS NFR BLD: 71.7 %
NEUTROPHILS NFR BLD: 72.4 %
NEUTROPHILS NFR BLD: 75.4 %
NEUTROPHILS NFR BLD: 80.6 %
NEUTROPHILS NFR BLD: 83.6 %
NEUTROPHILS NFR BLD: 83.7 %
NEUTROPHILS NFR BLD: 83.9 %
NEUTROPHILS NFR BLD: 84 %
NEUTROPHILS NFR BLD: 84.1 %
NEUTROPHILS NFR BLD: 84.6 %
NEUTROPHILS NFR BLD: 85.6 %
NEUTROPHILS NFR BLD: 86.3 %
NEUTROPHILS NFR BLD: 86.6 %
NEUTROPHILS NFR BLD: 87.1 %
NEUTROPHILS NFR BLD: 90.3 %
NEUTROPHILS NFR BLD: 90.7 %
NEUTROPHILS NFR BLD: 96.5 %
NITRITE UR QL STRIP: NEGATIVE
NONHDLC SERPL-MCNC: 90 MG/DL
NRBC BLD-RTO: 0 /100 WBC
NUM UNITS TRANS FFP: NORMAL
NUM UNITS TRANS FFP: NORMAL
NUM UNITS TRANS PACKED RBC: NORMAL
OSMOLALITY SERPL: 348 MOSM/KG
OVALOCYTES BLD QL SMEAR: ABNORMAL
PCO2 BLDA: 36.6 MMHG (ref 35–45)
PCO2 BLDA: 39.9 MMHG (ref 35–45)
PCO2 BLDA: 41.2 MMHG (ref 35–45)
PCO2 BLDA: 67.6 MMHG (ref 35–45)
PCO2 BLDA: 82.9 MMHG (ref 35–45)
PH SMN: 6.87 [PH] (ref 7.35–7.45)
PH SMN: 6.91 [PH] (ref 7.35–7.45)
PH SMN: 7.4 [PH] (ref 7.35–7.45)
PH UR STRIP: 5 [PH] (ref 5–8)
PHOSPHATE SERPL-MCNC: 1.6 MG/DL
PHOSPHATE SERPL-MCNC: 2.4 MG/DL
PHOSPHATE SERPL-MCNC: 2.6 MG/DL
PHOSPHATE SERPL-MCNC: 3.6 MG/DL
PHOSPHATE SERPL-MCNC: 3.8 MG/DL
PHOSPHATE SERPL-MCNC: 4.1 MG/DL
PHOSPHATE SERPL-MCNC: 4.2 MG/DL
PHOSPHATE SERPL-MCNC: 4.2 MG/DL
PHOSPHATE SERPL-MCNC: 5.4 MG/DL
PLATELET # BLD AUTO: 133 K/UL
PLATELET # BLD AUTO: 144 K/UL
PLATELET # BLD AUTO: 157 K/UL
PLATELET # BLD AUTO: 159 K/UL
PLATELET # BLD AUTO: 182 K/UL
PLATELET # BLD AUTO: 185 K/UL
PLATELET # BLD AUTO: 197 K/UL
PLATELET # BLD AUTO: 199 K/UL
PLATELET # BLD AUTO: 210 K/UL
PLATELET # BLD AUTO: 261 K/UL
PLATELET # BLD AUTO: 264 K/UL
PLATELET # BLD AUTO: 300 K/UL
PLATELET # BLD AUTO: 302 K/UL
PLATELET # BLD AUTO: 320 K/UL
PLATELET # BLD AUTO: 337 K/UL
PLATELET # BLD AUTO: 350 K/UL
PLATELET # BLD AUTO: 353 K/UL
PLATELET # BLD AUTO: 353 K/UL
PLATELET # BLD AUTO: 355 K/UL
PLATELET # BLD AUTO: 400 K/UL
PLATELET BLD QL SMEAR: ABNORMAL
PMV BLD AUTO: 10.2 FL
PMV BLD AUTO: 10.3 FL
PMV BLD AUTO: 10.7 FL
PMV BLD AUTO: 10.7 FL
PMV BLD AUTO: 10.8 FL
PMV BLD AUTO: 11.1 FL
PMV BLD AUTO: 11.1 FL
PMV BLD AUTO: 11.3 FL
PMV BLD AUTO: 11.3 FL
PMV BLD AUTO: 11.5 FL
PMV BLD AUTO: 11.6 FL
PMV BLD AUTO: 11.8 FL
PMV BLD AUTO: 8.2 FL
PMV BLD AUTO: 8.6 FL
PMV BLD AUTO: 8.7 FL
PMV BLD AUTO: 9.1 FL
PMV BLD AUTO: 9.1 FL
PMV BLD AUTO: 9.2 FL
PMV BLD AUTO: 9.4 FL
PMV BLD AUTO: 9.9 FL
PO2 BLDA: 231 MMHG (ref 80–100)
PO2 BLDA: 317 MMHG (ref 80–100)
PO2 BLDA: 45 MMHG (ref 80–100)
PO2 BLDA: 49 MMHG (ref 80–100)
PO2 BLDA: 51 MMHG (ref 80–100)
POC BE: -16 MMOL/L
POC BE: -2 MMOL/L
POC BE: -21 MMOL/L
POC BE: 0 MMOL/L
POC BE: 1 MMOL/L
POC IONIZED CALCIUM: 1.5 MMOL/L (ref 1.06–1.42)
POC SATURATED O2: 100 % (ref 95–100)
POC SATURATED O2: 100 % (ref 95–100)
POC SATURATED O2: 49 % (ref 95–100)
POC SATURATED O2: 57 % (ref 95–100)
POC SATURATED O2: 84 % (ref 95–100)
POC TCO2: 24 MMOL/L (ref 23–27)
POC TCO2: 26 MMOL/L (ref 23–27)
POC TCO2: 27 MMOL/L (ref 23–27)
POCT GLUCOSE: 100 MG/DL (ref 70–110)
POCT GLUCOSE: 101 MG/DL (ref 70–110)
POCT GLUCOSE: 114 MG/DL (ref 70–110)
POCT GLUCOSE: 122 MG/DL (ref 70–110)
POCT GLUCOSE: 125 MG/DL (ref 70–110)
POCT GLUCOSE: 128 MG/DL (ref 70–110)
POCT GLUCOSE: 131 MG/DL (ref 70–110)
POCT GLUCOSE: 135 MG/DL (ref 70–110)
POCT GLUCOSE: 141 MG/DL (ref 70–110)
POCT GLUCOSE: 143 MG/DL (ref 70–110)
POCT GLUCOSE: 147 MG/DL (ref 70–110)
POCT GLUCOSE: 149 MG/DL (ref 70–110)
POCT GLUCOSE: 153 MG/DL (ref 70–110)
POCT GLUCOSE: 155 MG/DL (ref 70–110)
POCT GLUCOSE: 157 MG/DL (ref 70–110)
POCT GLUCOSE: 161 MG/DL (ref 70–110)
POCT GLUCOSE: 162 MG/DL (ref 70–110)
POCT GLUCOSE: 167 MG/DL (ref 70–110)
POCT GLUCOSE: 169 MG/DL (ref 70–110)
POCT GLUCOSE: 171 MG/DL (ref 70–110)
POCT GLUCOSE: 173 MG/DL (ref 70–110)
POCT GLUCOSE: 174 MG/DL (ref 70–110)
POCT GLUCOSE: 178 MG/DL (ref 70–110)
POCT GLUCOSE: 181 MG/DL (ref 70–110)
POCT GLUCOSE: 183 MG/DL (ref 70–110)
POCT GLUCOSE: 183 MG/DL (ref 70–110)
POCT GLUCOSE: 185 MG/DL (ref 70–110)
POCT GLUCOSE: 187 MG/DL (ref 70–110)
POCT GLUCOSE: 190 MG/DL (ref 70–110)
POCT GLUCOSE: 191 MG/DL (ref 70–110)
POCT GLUCOSE: 193 MG/DL (ref 70–110)
POCT GLUCOSE: 194 MG/DL (ref 70–110)
POCT GLUCOSE: 194 MG/DL (ref 70–110)
POCT GLUCOSE: 195 MG/DL (ref 70–110)
POCT GLUCOSE: 197 MG/DL (ref 70–110)
POCT GLUCOSE: 201 MG/DL (ref 70–110)
POCT GLUCOSE: 204 MG/DL (ref 70–110)
POCT GLUCOSE: 205 MG/DL (ref 70–110)
POCT GLUCOSE: 211 MG/DL (ref 70–110)
POCT GLUCOSE: 212 MG/DL (ref 70–110)
POCT GLUCOSE: 213 MG/DL (ref 70–110)
POCT GLUCOSE: 217 MG/DL (ref 70–110)
POCT GLUCOSE: 220 MG/DL (ref 70–110)
POCT GLUCOSE: 228 MG/DL (ref 70–110)
POCT GLUCOSE: 231 MG/DL (ref 70–110)
POCT GLUCOSE: 234 MG/DL (ref 70–110)
POCT GLUCOSE: 237 MG/DL (ref 70–110)
POCT GLUCOSE: 240 MG/DL (ref 70–110)
POCT GLUCOSE: 242 MG/DL (ref 70–110)
POCT GLUCOSE: 244 MG/DL (ref 70–110)
POCT GLUCOSE: 246 MG/DL (ref 70–110)
POCT GLUCOSE: 250 MG/DL (ref 70–110)
POCT GLUCOSE: 251 MG/DL (ref 70–110)
POCT GLUCOSE: 251 MG/DL (ref 70–110)
POCT GLUCOSE: 254 MG/DL (ref 70–110)
POCT GLUCOSE: 255 MG/DL (ref 70–110)
POCT GLUCOSE: 256 MG/DL (ref 70–110)
POCT GLUCOSE: 258 MG/DL (ref 70–110)
POCT GLUCOSE: 259 MG/DL (ref 70–110)
POCT GLUCOSE: 259 MG/DL (ref 70–110)
POCT GLUCOSE: 26 MG/DL (ref 70–110)
POCT GLUCOSE: 264 MG/DL (ref 70–110)
POCT GLUCOSE: 265 MG/DL (ref 70–110)
POCT GLUCOSE: 265 MG/DL (ref 70–110)
POCT GLUCOSE: 27 MG/DL (ref 70–110)
POCT GLUCOSE: 276 MG/DL (ref 70–110)
POCT GLUCOSE: 276 MG/DL (ref 70–110)
POCT GLUCOSE: 277 MG/DL (ref 70–110)
POCT GLUCOSE: 280 MG/DL (ref 70–110)
POCT GLUCOSE: 284 MG/DL (ref 70–110)
POCT GLUCOSE: 296 MG/DL (ref 70–110)
POCT GLUCOSE: 299 MG/DL (ref 70–110)
POCT GLUCOSE: 302 MG/DL (ref 70–110)
POCT GLUCOSE: 305 MG/DL (ref 70–110)
POCT GLUCOSE: 316 MG/DL (ref 70–110)
POCT GLUCOSE: 317 MG/DL (ref 70–110)
POCT GLUCOSE: 317 MG/DL (ref 70–110)
POCT GLUCOSE: 318 MG/DL (ref 70–110)
POCT GLUCOSE: 318 MG/DL (ref 70–110)
POCT GLUCOSE: 323 MG/DL (ref 70–110)
POCT GLUCOSE: 324 MG/DL (ref 70–110)
POCT GLUCOSE: 333 MG/DL (ref 70–110)
POCT GLUCOSE: 340 MG/DL (ref 70–110)
POCT GLUCOSE: 342 MG/DL (ref 70–110)
POCT GLUCOSE: 348 MG/DL (ref 70–110)
POCT GLUCOSE: 357 MG/DL (ref 70–110)
POCT GLUCOSE: 365 MG/DL (ref 70–110)
POCT GLUCOSE: 368 MG/DL (ref 70–110)
POCT GLUCOSE: 376 MG/DL (ref 70–110)
POCT GLUCOSE: 385 MG/DL (ref 70–110)
POCT GLUCOSE: 388 MG/DL (ref 70–110)
POCT GLUCOSE: 389 MG/DL (ref 70–110)
POCT GLUCOSE: 391 MG/DL (ref 70–110)
POCT GLUCOSE: 393 MG/DL (ref 70–110)
POCT GLUCOSE: 397 MG/DL (ref 70–110)
POCT GLUCOSE: 397 MG/DL (ref 70–110)
POCT GLUCOSE: 400 MG/DL (ref 70–110)
POCT GLUCOSE: 400 MG/DL (ref 70–110)
POCT GLUCOSE: 403 MG/DL (ref 70–110)
POCT GLUCOSE: 404 MG/DL (ref 70–110)
POCT GLUCOSE: 407 MG/DL (ref 70–110)
POCT GLUCOSE: 408 MG/DL (ref 70–110)
POCT GLUCOSE: 410 MG/DL (ref 70–110)
POCT GLUCOSE: 410 MG/DL (ref 70–110)
POCT GLUCOSE: 411 MG/DL (ref 70–110)
POCT GLUCOSE: 413 MG/DL (ref 70–110)
POCT GLUCOSE: 422 MG/DL (ref 70–110)
POCT GLUCOSE: 427 MG/DL (ref 70–110)
POCT GLUCOSE: 431 MG/DL (ref 70–110)
POCT GLUCOSE: 432 MG/DL (ref 70–110)
POCT GLUCOSE: 434 MG/DL (ref 70–110)
POCT GLUCOSE: 437 MG/DL (ref 70–110)
POCT GLUCOSE: 437 MG/DL (ref 70–110)
POCT GLUCOSE: 438 MG/DL (ref 70–110)
POCT GLUCOSE: 440 MG/DL (ref 70–110)
POCT GLUCOSE: 441 MG/DL (ref 70–110)
POCT GLUCOSE: 445 MG/DL (ref 70–110)
POCT GLUCOSE: 455 MG/DL (ref 70–110)
POCT GLUCOSE: 486 MG/DL (ref 70–110)
POCT GLUCOSE: 62 MG/DL (ref 70–110)
POCT GLUCOSE: 63 MG/DL (ref 70–110)
POCT GLUCOSE: 79 MG/DL (ref 70–110)
POCT GLUCOSE: 80 MG/DL (ref 70–110)
POCT GLUCOSE: 90 MG/DL (ref 70–110)
POCT GLUCOSE: 91 MG/DL (ref 70–110)
POCT GLUCOSE: 93 MG/DL (ref 70–110)
POCT GLUCOSE: 93 MG/DL (ref 70–110)
POCT GLUCOSE: <20 MG/DL (ref 70–110)
POCT GLUCOSE: >500 MG/DL (ref 70–110)
POIKILOCYTOSIS BLD QL SMEAR: SLIGHT
POLYCHROMASIA BLD QL SMEAR: ABNORMAL
POTASSIUM BLD-SCNC: 5.7 MMOL/L (ref 3.5–5.1)
POTASSIUM SERPL-SCNC: 3.2 MMOL/L
POTASSIUM SERPL-SCNC: 3.2 MMOL/L
POTASSIUM SERPL-SCNC: 3.4 MMOL/L
POTASSIUM SERPL-SCNC: 3.5 MMOL/L
POTASSIUM SERPL-SCNC: 3.6 MMOL/L
POTASSIUM SERPL-SCNC: 3.7 MMOL/L
POTASSIUM SERPL-SCNC: 3.7 MMOL/L
POTASSIUM SERPL-SCNC: 3.8 MMOL/L
POTASSIUM SERPL-SCNC: 4 MMOL/L
POTASSIUM SERPL-SCNC: 4 MMOL/L
POTASSIUM SERPL-SCNC: 4.1 MMOL/L
POTASSIUM SERPL-SCNC: 4.2 MMOL/L
POTASSIUM SERPL-SCNC: 4.2 MMOL/L
POTASSIUM SERPL-SCNC: 4.3 MMOL/L
POTASSIUM SERPL-SCNC: 4.4 MMOL/L
POTASSIUM SERPL-SCNC: 4.5 MMOL/L
POTASSIUM SERPL-SCNC: 4.6 MMOL/L
POTASSIUM SERPL-SCNC: 4.6 MMOL/L
POTASSIUM SERPL-SCNC: 4.7 MMOL/L
POTASSIUM SERPL-SCNC: 5 MMOL/L
POTASSIUM SERPL-SCNC: 5 MMOL/L
POTASSIUM SERPL-SCNC: 5.1 MMOL/L
PROCALCITONIN SERPL IA-MCNC: 6.53 NG/ML
PROCALCITONIN SERPL IA-MCNC: 8.17 NG/ML
PROCALCITONIN SERPL IA-MCNC: 9.09 NG/ML
PROT SERPL-MCNC: 5.4 G/DL
PROT SERPL-MCNC: 6 G/DL
PROT SERPL-MCNC: 6.1 G/DL
PROT SERPL-MCNC: 6.4 G/DL
PROT SERPL-MCNC: 6.5 G/DL
PROT SERPL-MCNC: 6.8 G/DL
PROT SERPL-MCNC: 7 G/DL
PROT SERPL-MCNC: 7.3 G/DL
PROT SERPL-MCNC: 7.4 G/DL
PROT SERPL-MCNC: 7.5 G/DL
PROT SERPL-MCNC: 7.9 G/DL
PROT SERPL-MCNC: 7.9 G/DL
PROT UR QL STRIP: ABNORMAL
PROTHROMBIN TIME: 10.3 SEC
PROTHROMBIN TIME: 12 SEC
PROTHROMBIN TIME: 12.7 SEC
RBC # BLD AUTO: 2.66 M/UL
RBC # BLD AUTO: 2.81 M/UL
RBC # BLD AUTO: 2.94 M/UL
RBC # BLD AUTO: 2.96 M/UL
RBC # BLD AUTO: 2.97 M/UL
RBC # BLD AUTO: 3.08 M/UL
RBC # BLD AUTO: 3.08 M/UL
RBC # BLD AUTO: 3.25 M/UL
RBC # BLD AUTO: 3.25 M/UL
RBC # BLD AUTO: 3.27 M/UL
RBC # BLD AUTO: 3.34 M/UL
RBC # BLD AUTO: 3.34 M/UL
RBC # BLD AUTO: 3.45 M/UL
RBC # BLD AUTO: 3.63 M/UL
RBC # BLD AUTO: 3.68 M/UL
RBC # BLD AUTO: 3.92 M/UL
RBC # BLD AUTO: 3.94 M/UL
RBC # BLD AUTO: 4.04 M/UL
RBC #/AREA URNS AUTO: 15 /HPF (ref 0–4)
RETIRED EF AND QEF - SEE NOTES: 60 (ref 55–65)
RETIRED EF AND QEF - SEE NOTES: 60 (ref 55–65)
SAMPLE: ABNORMAL
SATURATED IRON: 19 %
SCHISTOCYTES BLD QL SMEAR: ABNORMAL
SCHISTOCYTES BLD QL SMEAR: PRESENT
SITE: ABNORMAL
SODIUM BLD-SCNC: 145 MMOL/L (ref 136–145)
SODIUM SERPL-SCNC: 135 MMOL/L
SODIUM SERPL-SCNC: 136 MMOL/L
SODIUM SERPL-SCNC: 136 MMOL/L
SODIUM SERPL-SCNC: 137 MMOL/L
SODIUM SERPL-SCNC: 137 MMOL/L
SODIUM SERPL-SCNC: 138 MMOL/L
SODIUM SERPL-SCNC: 139 MMOL/L
SODIUM SERPL-SCNC: 141 MMOL/L
SODIUM SERPL-SCNC: 142 MMOL/L
SODIUM SERPL-SCNC: 144 MMOL/L
SODIUM SERPL-SCNC: 145 MMOL/L
SODIUM SERPL-SCNC: 146 MMOL/L
SODIUM SERPL-SCNC: 147 MMOL/L
SODIUM SERPL-SCNC: 148 MMOL/L
SODIUM SERPL-SCNC: 150 MMOL/L
SODIUM SERPL-SCNC: 152 MMOL/L
SODIUM SERPL-SCNC: 153 MMOL/L
SODIUM SERPL-SCNC: 155 MMOL/L
SODIUM SERPL-SCNC: 155 MMOL/L
SODIUM SERPL-SCNC: 157 MMOL/L
SODIUM SERPL-SCNC: 158 MMOL/L
SODIUM SERPL-SCNC: 158 MMOL/L
SODIUM UR-SCNC: 27 MMOL/L
SP GR UR STRIP: 1.01 (ref 1–1.03)
SP02: 90
SP02: 99
SPHEROCYTES BLD QL SMEAR: ABNORMAL
SQUAMOUS #/AREA URNS AUTO: 2 /HPF
T4 FREE SERPL-MCNC: 0.82 NG/DL
T4 SERPL-MCNC: 4.5 UG/DL
TARGETS BLD QL SMEAR: ABNORMAL
TOTAL IRON BINDING CAPACITY: 204 UG/DL
TRANS ERYTHROCYTES VOL PATIENT: NORMAL ML
TRANS PLATPHERESIS VOL PATIENT: NORMAL ML
TRANSFERRIN SERPL-MCNC: 138 MG/DL
TRICUSPID VALVE REGURGITATION: ABNORMAL
TRICUSPID VALVE REGURGITATION: ABNORMAL
TRIGL SERPL-MCNC: 87 MG/DL
TROPONIN I SERPL DL<=0.01 NG/ML-MCNC: 0.11 NG/ML
TROPONIN I SERPL DL<=0.01 NG/ML-MCNC: 0.12 NG/ML
TROPONIN I SERPL DL<=0.01 NG/ML-MCNC: 0.12 NG/ML
TROPONIN I SERPL DL<=0.01 NG/ML-MCNC: 0.43 NG/ML
TROPONIN I SERPL DL<=0.01 NG/ML-MCNC: 0.47 NG/ML
TROPONIN I SERPL DL<=0.01 NG/ML-MCNC: 3.06 NG/ML
TSH SERPL DL<=0.005 MIU/L-ACNC: 0.23 UIU/ML
TSH SERPL DL<=0.005 MIU/L-ACNC: 0.59 UIU/ML
UNIT NUMBER: NORMAL
URN SPEC COLLECT METH UR: ABNORMAL
UROBILINOGEN UR STRIP-ACNC: NEGATIVE EU/DL
UUN UR-MCNC: 778 MG/DL
VANCOMYCIN SERPL-MCNC: 14.7 UG/ML
VANCOMYCIN SERPL-MCNC: 17.1 UG/ML
VANCOMYCIN SERPL-MCNC: 8.8 UG/ML
VT: 5
WBC # BLD AUTO: 10.54 K/UL
WBC # BLD AUTO: 12.75 K/UL
WBC # BLD AUTO: 13.01 K/UL
WBC # BLD AUTO: 13.35 K/UL
WBC # BLD AUTO: 14.5 K/UL
WBC # BLD AUTO: 14.68 K/UL
WBC # BLD AUTO: 16.04 K/UL
WBC # BLD AUTO: 20.84 K/UL
WBC # BLD AUTO: 21.8 K/UL
WBC # BLD AUTO: 22.02 K/UL
WBC # BLD AUTO: 22.03 K/UL
WBC # BLD AUTO: 22.03 K/UL
WBC # BLD AUTO: 22.17 K/UL
WBC # BLD AUTO: 22.48 K/UL
WBC # BLD AUTO: 22.82 K/UL
WBC # BLD AUTO: 23.74 K/UL
WBC # BLD AUTO: 24.14 K/UL
WBC # BLD AUTO: 25.27 K/UL
WBC # BLD AUTO: 25.74 K/UL
WBC # BLD AUTO: 35.47 K/UL
WBC #/AREA URNS AUTO: 12 /HPF (ref 0–5)
YEAST UR QL AUTO: ABNORMAL

## 2017-01-01 PROCEDURE — 25000003 PHARM REV CODE 250: Performed by: STUDENT IN AN ORGANIZED HEALTH CARE EDUCATION/TRAINING PROGRAM

## 2017-01-01 PROCEDURE — 97535 SELF CARE MNGMENT TRAINING: CPT

## 2017-01-01 PROCEDURE — 83880 ASSAY OF NATRIURETIC PEPTIDE: CPT

## 2017-01-01 PROCEDURE — 87070 CULTURE OTHR SPECIMN AEROBIC: CPT

## 2017-01-01 PROCEDURE — 25000003 PHARM REV CODE 250: Performed by: INTERNAL MEDICINE

## 2017-01-01 PROCEDURE — 63600175 PHARM REV CODE 636 W HCPCS: Performed by: INTERNAL MEDICINE

## 2017-01-01 PROCEDURE — 63600175 PHARM REV CODE 636 W HCPCS: Performed by: STUDENT IN AN ORGANIZED HEALTH CARE EDUCATION/TRAINING PROGRAM

## 2017-01-01 PROCEDURE — 99232 SBSQ HOSP IP/OBS MODERATE 35: CPT | Mod: ,,, | Performed by: INTERNAL MEDICINE

## 2017-01-01 PROCEDURE — 83735 ASSAY OF MAGNESIUM: CPT

## 2017-01-01 PROCEDURE — 27100171 HC OXYGEN HIGH FLOW UP TO 24 HOURS

## 2017-01-01 PROCEDURE — 25000003 PHARM REV CODE 250

## 2017-01-01 PROCEDURE — 84484 ASSAY OF TROPONIN QUANT: CPT

## 2017-01-01 PROCEDURE — 80048 BASIC METABOLIC PNL TOTAL CA: CPT

## 2017-01-01 PROCEDURE — 20000000 HC ICU ROOM

## 2017-01-01 PROCEDURE — 94640 AIRWAY INHALATION TREATMENT: CPT

## 2017-01-01 PROCEDURE — 97163 PT EVAL HIGH COMPLEX 45 MIN: CPT

## 2017-01-01 PROCEDURE — 93018 CV STRESS TEST I&R ONLY: CPT | Mod: ,,, | Performed by: INTERNAL MEDICINE

## 2017-01-01 PROCEDURE — D9220A PRA ANESTHESIA: Mod: CRNA,,, | Performed by: NURSE ANESTHETIST, CERTIFIED REGISTERED

## 2017-01-01 PROCEDURE — P9021 RED BLOOD CELLS UNIT: HCPCS

## 2017-01-01 PROCEDURE — A4216 STERILE WATER/SALINE, 10 ML: HCPCS | Performed by: HOSPITALIST

## 2017-01-01 PROCEDURE — 94660 CPAP INITIATION&MGMT: CPT

## 2017-01-01 PROCEDURE — 99223 1ST HOSP IP/OBS HIGH 75: CPT | Mod: AI,GC,, | Performed by: HOSPITALIST

## 2017-01-01 PROCEDURE — 85025 COMPLETE CBC W/AUTO DIFF WBC: CPT | Mod: 91

## 2017-01-01 PROCEDURE — P9017 PLASMA 1 DONOR FRZ W/IN 8 HR: HCPCS

## 2017-01-01 PROCEDURE — 94002 VENT MGMT INPAT INIT DAY: CPT

## 2017-01-01 PROCEDURE — 85025 COMPLETE CBC W/AUTO DIFF WBC: CPT

## 2017-01-01 PROCEDURE — 20600001 HC STEP DOWN PRIVATE ROOM

## 2017-01-01 PROCEDURE — 43235 EGD DIAGNOSTIC BRUSH WASH: CPT

## 2017-01-01 PROCEDURE — 99285 EMERGENCY DEPT VISIT HI MDM: CPT

## 2017-01-01 PROCEDURE — 94760 N-INVAS EAR/PLS OXIMETRY 1: CPT

## 2017-01-01 PROCEDURE — 99222 1ST HOSP IP/OBS MODERATE 55: CPT | Mod: ,,, | Performed by: INTERNAL MEDICINE

## 2017-01-01 PROCEDURE — 80053 COMPREHEN METABOLIC PANEL: CPT

## 2017-01-01 PROCEDURE — 25000003 PHARM REV CODE 250: Performed by: HOSPITALIST

## 2017-01-01 PROCEDURE — 84100 ASSAY OF PHOSPHORUS: CPT

## 2017-01-01 PROCEDURE — 80202 ASSAY OF VANCOMYCIN: CPT | Mod: 91

## 2017-01-01 PROCEDURE — 99900035 HC TECH TIME PER 15 MIN (STAT)

## 2017-01-01 PROCEDURE — 83735 ASSAY OF MAGNESIUM: CPT | Mod: 91

## 2017-01-01 PROCEDURE — P9012 CRYOPRECIPITATE EACH UNIT: HCPCS

## 2017-01-01 PROCEDURE — 83690 ASSAY OF LIPASE: CPT | Mod: 91

## 2017-01-01 PROCEDURE — 82010 KETONE BODYS QUAN: CPT

## 2017-01-01 PROCEDURE — 97530 THERAPEUTIC ACTIVITIES: CPT

## 2017-01-01 PROCEDURE — 93005 ELECTROCARDIOGRAM TRACING: CPT

## 2017-01-01 PROCEDURE — 63600175 PHARM REV CODE 636 W HCPCS: Performed by: HOSPITALIST

## 2017-01-01 PROCEDURE — 80048 BASIC METABOLIC PNL TOTAL CA: CPT | Mod: 91

## 2017-01-01 PROCEDURE — 84540 ASSAY OF URINE/UREA-N: CPT

## 2017-01-01 PROCEDURE — 85610 PROTHROMBIN TIME: CPT

## 2017-01-01 PROCEDURE — 93306 TTE W/DOPPLER COMPLETE: CPT

## 2017-01-01 PROCEDURE — 87205 SMEAR GRAM STAIN: CPT

## 2017-01-01 PROCEDURE — 93306 TTE W/DOPPLER COMPLETE: CPT | Mod: 26,,, | Performed by: INTERNAL MEDICINE

## 2017-01-01 PROCEDURE — 94761 N-INVAS EAR/PLS OXIMETRY MLT: CPT

## 2017-01-01 PROCEDURE — C9113 INJ PANTOPRAZOLE SODIUM, VIA: HCPCS | Performed by: INTERNAL MEDICINE

## 2017-01-01 PROCEDURE — 25000242 PHARM REV CODE 250 ALT 637 W/ HCPCS: Performed by: STUDENT IN AN ORGANIZED HEALTH CARE EDUCATION/TRAINING PROGRAM

## 2017-01-01 PROCEDURE — 97166 OT EVAL MOD COMPLEX 45 MIN: CPT

## 2017-01-01 PROCEDURE — 86985 SPLIT BLOOD OR PRODUCTS: CPT

## 2017-01-01 PROCEDURE — 86901 BLOOD TYPING SEROLOGIC RH(D): CPT

## 2017-01-01 PROCEDURE — 43235 EGD DIAGNOSTIC BRUSH WASH: CPT | Performed by: INTERNAL MEDICINE

## 2017-01-01 PROCEDURE — 97110 THERAPEUTIC EXERCISES: CPT

## 2017-01-01 PROCEDURE — P9035 PLATELET PHERES LEUKOREDUCED: HCPCS

## 2017-01-01 PROCEDURE — 84145 PROCALCITONIN (PCT): CPT

## 2017-01-01 PROCEDURE — C9113 INJ PANTOPRAZOLE SODIUM, VIA: HCPCS

## 2017-01-01 PROCEDURE — 63600175 PHARM REV CODE 636 W HCPCS

## 2017-01-01 PROCEDURE — 27000221 HC OXYGEN, UP TO 24 HOURS

## 2017-01-01 PROCEDURE — 36430 TRANSFUSION BLD/BLD COMPNT: CPT

## 2017-01-01 PROCEDURE — 84132 ASSAY OF SERUM POTASSIUM: CPT

## 2017-01-01 PROCEDURE — 92610 EVALUATE SWALLOWING FUNCTION: CPT

## 2017-01-01 PROCEDURE — 36415 COLL VENOUS BLD VENIPUNCTURE: CPT

## 2017-01-01 PROCEDURE — 36600 WITHDRAWAL OF ARTERIAL BLOOD: CPT

## 2017-01-01 PROCEDURE — 86900 BLOOD TYPING SEROLOGIC ABO: CPT

## 2017-01-01 PROCEDURE — 83540 ASSAY OF IRON: CPT

## 2017-01-01 PROCEDURE — 78492 MYOCRD IMG PET MLT RST&STRS: CPT

## 2017-01-01 PROCEDURE — 99232 SBSQ HOSP IP/OBS MODERATE 35: CPT | Mod: GC,,, | Performed by: HOSPITALIST

## 2017-01-01 PROCEDURE — 82803 BLOOD GASES ANY COMBINATION: CPT

## 2017-01-01 PROCEDURE — 92526 ORAL FUNCTION THERAPY: CPT

## 2017-01-01 PROCEDURE — 83690 ASSAY OF LIPASE: CPT

## 2017-01-01 PROCEDURE — 27000190 HC CPAP FULL FACE MASK W/VALVE

## 2017-01-01 PROCEDURE — 51702 INSERT TEMP BLADDER CATH: CPT

## 2017-01-01 PROCEDURE — 78492 MYOCRD IMG PET MLT RST&STRS: CPT | Mod: 26,,, | Performed by: INTERNAL MEDICINE

## 2017-01-01 PROCEDURE — 82962 GLUCOSE BLOOD TEST: CPT | Performed by: INTERNAL MEDICINE

## 2017-01-01 PROCEDURE — 97116 GAIT TRAINING THERAPY: CPT

## 2017-01-01 PROCEDURE — 86580 TB INTRADERMAL TEST: CPT | Performed by: HOSPITALIST

## 2017-01-01 PROCEDURE — 63600175 PHARM REV CODE 636 W HCPCS: Performed by: SURGERY

## 2017-01-01 PROCEDURE — 99233 SBSQ HOSP IP/OBS HIGH 50: CPT | Mod: GC,,, | Performed by: HOSPITALIST

## 2017-01-01 PROCEDURE — 86141 C-REACTIVE PROTEIN HS: CPT

## 2017-01-01 PROCEDURE — 84443 ASSAY THYROID STIM HORMONE: CPT

## 2017-01-01 PROCEDURE — 85520 HEPARIN ASSAY: CPT

## 2017-01-01 PROCEDURE — 93016 CV STRESS TEST SUPVJ ONLY: CPT | Mod: ,,, | Performed by: INTERNAL MEDICINE

## 2017-01-01 PROCEDURE — 83605 ASSAY OF LACTIC ACID: CPT

## 2017-01-01 PROCEDURE — 85014 HEMATOCRIT: CPT

## 2017-01-01 PROCEDURE — 43235 EGD DIAGNOSTIC BRUSH WASH: CPT | Mod: ,,, | Performed by: INTERNAL MEDICINE

## 2017-01-01 PROCEDURE — 93010 ELECTROCARDIOGRAM REPORT: CPT | Mod: 76,,, | Performed by: INTERNAL MEDICINE

## 2017-01-01 PROCEDURE — 83036 HEMOGLOBIN GLYCOSYLATED A1C: CPT

## 2017-01-01 PROCEDURE — 87106 FUNGI IDENTIFICATION YEAST: CPT

## 2017-01-01 PROCEDURE — 85730 THROMBOPLASTIN TIME PARTIAL: CPT

## 2017-01-01 PROCEDURE — 84300 ASSAY OF URINE SODIUM: CPT

## 2017-01-01 PROCEDURE — 99291 CRITICAL CARE FIRST HOUR: CPT | Mod: ,,, | Performed by: INTERNAL MEDICINE

## 2017-01-01 PROCEDURE — 99233 SBSQ HOSP IP/OBS HIGH 50: CPT | Mod: ,,, | Performed by: INTERNAL MEDICINE

## 2017-01-01 PROCEDURE — 93010 ELECTROCARDIOGRAM REPORT: CPT | Mod: ,,, | Performed by: INTERNAL MEDICINE

## 2017-01-01 PROCEDURE — 99232 SBSQ HOSP IP/OBS MODERATE 35: CPT | Mod: GC,,, | Performed by: INTERNAL MEDICINE

## 2017-01-01 PROCEDURE — 99238 HOSP IP/OBS DSCHRG MGMT 30/<: CPT | Mod: GC,,, | Performed by: HOSPITALIST

## 2017-01-01 PROCEDURE — 86703 HIV-1/HIV-2 1 RESULT ANTBDY: CPT

## 2017-01-01 PROCEDURE — 99222 1ST HOSP IP/OBS MODERATE 55: CPT | Mod: 25,GC,, | Performed by: INTERNAL MEDICINE

## 2017-01-01 PROCEDURE — 81001 URINALYSIS AUTO W/SCOPE: CPT

## 2017-01-01 PROCEDURE — 84439 ASSAY OF FREE THYROXINE: CPT

## 2017-01-01 PROCEDURE — 27000633 HC CORTRAK FEEDING TUBE

## 2017-01-01 PROCEDURE — 97112 NEUROMUSCULAR REEDUCATION: CPT

## 2017-01-01 PROCEDURE — 85007 BL SMEAR W/DIFF WBC COUNT: CPT

## 2017-01-01 PROCEDURE — 87449 NOS EACH ORGANISM AG IA: CPT

## 2017-01-01 PROCEDURE — 63600175 PHARM REV CODE 636 W HCPCS: Performed by: NURSE ANESTHETIST, CERTIFIED REGISTERED

## 2017-01-01 PROCEDURE — 0DJ08ZZ INSPECTION OF UPPER INTESTINAL TRACT, VIA NATURAL OR ARTIFICIAL OPENING ENDOSCOPIC: ICD-10-PCS | Performed by: INTERNAL MEDICINE

## 2017-01-01 PROCEDURE — C1751 CATH, INF, PER/CENT/MIDLINE: HCPCS

## 2017-01-01 PROCEDURE — 80061 LIPID PANEL: CPT

## 2017-01-01 PROCEDURE — 80202 ASSAY OF VANCOMYCIN: CPT

## 2017-01-01 PROCEDURE — 82728 ASSAY OF FERRITIN: CPT

## 2017-01-01 PROCEDURE — 86920 COMPATIBILITY TEST SPIN: CPT

## 2017-01-01 PROCEDURE — 85651 RBC SED RATE NONAUTOMATED: CPT

## 2017-01-01 PROCEDURE — 84484 ASSAY OF TROPONIN QUANT: CPT | Mod: 91

## 2017-01-01 PROCEDURE — 25000003 PHARM REV CODE 250: Performed by: NURSE ANESTHETIST, CERTIFIED REGISTERED

## 2017-01-01 PROCEDURE — 84295 ASSAY OF SERUM SODIUM: CPT

## 2017-01-01 PROCEDURE — 80053 COMPREHEN METABOLIC PANEL: CPT | Mod: 91

## 2017-01-01 PROCEDURE — 84145 PROCALCITONIN (PCT): CPT | Mod: 91

## 2017-01-01 PROCEDURE — 82570 ASSAY OF URINE CREATININE: CPT

## 2017-01-01 PROCEDURE — 86850 RBC ANTIBODY SCREEN: CPT

## 2017-01-01 PROCEDURE — 97802 MEDICAL NUTRITION INDIV IN: CPT

## 2017-01-01 PROCEDURE — 97165 OT EVAL LOW COMPLEX 30 MIN: CPT

## 2017-01-01 PROCEDURE — 85027 COMPLETE CBC AUTOMATED: CPT

## 2017-01-01 PROCEDURE — 36556 INSERT NON-TUNNEL CV CATH: CPT

## 2017-01-01 PROCEDURE — 99231 SBSQ HOSP IP/OBS SF/LOW 25: CPT | Mod: GC,,, | Performed by: HOSPITALIST

## 2017-01-01 PROCEDURE — 85610 PROTHROMBIN TIME: CPT | Mod: 91

## 2017-01-01 PROCEDURE — D9220A PRA ANESTHESIA: Mod: ANES,,, | Performed by: ANESTHESIOLOGY

## 2017-01-01 PROCEDURE — 87040 BLOOD CULTURE FOR BACTERIA: CPT

## 2017-01-01 PROCEDURE — P9011 BLOOD SPLIT UNIT: HCPCS

## 2017-01-01 PROCEDURE — 37799 UNLISTED PX VASCULAR SURGERY: CPT

## 2017-01-01 PROCEDURE — 82330 ASSAY OF CALCIUM: CPT

## 2017-01-01 PROCEDURE — 99223 1ST HOSP IP/OBS HIGH 75: CPT | Mod: ,,, | Performed by: INTERNAL MEDICINE

## 2017-01-01 PROCEDURE — 37000008 HC ANESTHESIA 1ST 15 MINUTES: Performed by: INTERNAL MEDICINE

## 2017-01-01 PROCEDURE — P9016 RBC LEUKOCYTES REDUCED: HCPCS

## 2017-01-01 PROCEDURE — 92950 HEART/LUNG RESUSCITATION CPR: CPT

## 2017-01-01 PROCEDURE — 97161 PT EVAL LOW COMPLEX 20 MIN: CPT

## 2017-01-01 PROCEDURE — 84100 ASSAY OF PHOSPHORUS: CPT | Mod: 91

## 2017-01-01 PROCEDURE — 37000009 HC ANESTHESIA EA ADD 15 MINS: Performed by: INTERNAL MEDICINE

## 2017-01-01 PROCEDURE — 51798 US URINE CAPACITY MEASURE: CPT

## 2017-01-01 PROCEDURE — 84436 ASSAY OF TOTAL THYROXINE: CPT

## 2017-01-01 PROCEDURE — 83930 ASSAY OF BLOOD OSMOLALITY: CPT

## 2017-01-01 RX ORDER — ONDANSETRON 8 MG/1
8 TABLET, ORALLY DISINTEGRATING ORAL EVERY 8 HOURS PRN
Status: DISCONTINUED | OUTPATIENT
Start: 2017-01-01 | End: 2017-01-01 | Stop reason: HOSPADM

## 2017-01-01 RX ORDER — INSULIN ASPART 100 [IU]/ML
30 INJECTION, SOLUTION INTRAVENOUS; SUBCUTANEOUS
Status: DISCONTINUED | OUTPATIENT
Start: 2017-01-01 | End: 2017-01-01 | Stop reason: HOSPADM

## 2017-01-01 RX ORDER — MAG HYDROX/ALUMINUM HYD/SIMETH 200-200-20
30 SUSPENSION, ORAL (FINAL DOSE FORM) ORAL EVERY 6 HOURS PRN
Status: DISCONTINUED | OUTPATIENT
Start: 2017-01-01 | End: 2017-01-01 | Stop reason: HOSPADM

## 2017-01-01 RX ORDER — ASPIRIN 81 MG/1
81 TABLET ORAL DAILY
Status: DISCONTINUED | OUTPATIENT
Start: 2017-01-01 | End: 2017-01-01

## 2017-01-01 RX ORDER — PREDNISONE 10 MG/1
TABLET ORAL
Qty: 7 TABLET | Refills: 0 | Status: ON HOLD | OUTPATIENT
Start: 2017-01-01 | End: 2017-01-01 | Stop reason: HOSPADM

## 2017-01-01 RX ORDER — DEXTROSE 50 % IN WATER (D50W) INTRAVENOUS SYRINGE
Status: COMPLETED
Start: 2017-01-01 | End: 2017-01-01

## 2017-01-01 RX ORDER — NAPROXEN SODIUM 220 MG/1
81 TABLET, FILM COATED ORAL DAILY
Status: DISCONTINUED | OUTPATIENT
Start: 2017-01-01 | End: 2017-01-01 | Stop reason: HOSPADM

## 2017-01-01 RX ORDER — IBUPROFEN 200 MG
16 TABLET ORAL
Status: DISCONTINUED | OUTPATIENT
Start: 2017-01-01 | End: 2017-01-01 | Stop reason: HOSPADM

## 2017-01-01 RX ORDER — BUPROPION HYDROCHLORIDE 100 MG/1
100 TABLET ORAL 2 TIMES DAILY
Status: DISCONTINUED | OUTPATIENT
Start: 2017-01-01 | End: 2017-01-01 | Stop reason: HOSPADM

## 2017-01-01 RX ORDER — FUROSEMIDE 10 MG/ML
80 INJECTION INTRAMUSCULAR; INTRAVENOUS 3 TIMES DAILY
Status: DISCONTINUED | OUTPATIENT
Start: 2017-01-01 | End: 2017-01-01

## 2017-01-01 RX ORDER — ONDANSETRON 2 MG/ML
4 INJECTION INTRAMUSCULAR; INTRAVENOUS EVERY 6 HOURS PRN
Status: DISCONTINUED | OUTPATIENT
Start: 2017-01-01 | End: 2017-01-01 | Stop reason: HOSPADM

## 2017-01-01 RX ORDER — VASOPRESSIN 20 [USP'U]/ML
INJECTION, SOLUTION INTRAMUSCULAR; SUBCUTANEOUS
Status: DISCONTINUED
Start: 2017-01-01 | End: 2017-01-01 | Stop reason: HOSPADM

## 2017-01-01 RX ORDER — CARVEDILOL 12.5 MG/1
12.5 TABLET ORAL 2 TIMES DAILY
Status: DISCONTINUED | OUTPATIENT
Start: 2017-01-01 | End: 2017-01-01

## 2017-01-01 RX ORDER — SODIUM CHLORIDE 0.9 % (FLUSH) 0.9 %
3 SYRINGE (ML) INJECTION EVERY 8 HOURS
Status: DISCONTINUED | OUTPATIENT
Start: 2017-01-01 | End: 2017-01-01 | Stop reason: HOSPADM

## 2017-01-01 RX ORDER — BUPROPION HYDROCHLORIDE 100 MG/1
100 TABLET, EXTENDED RELEASE ORAL 2 TIMES DAILY
COMMUNITY

## 2017-01-01 RX ORDER — NITROGLYCERIN 0.4 MG/1
0.4 TABLET SUBLINGUAL EVERY 5 MIN PRN
Qty: 25 TABLET | Refills: 3 | Status: SHIPPED | OUTPATIENT
Start: 2017-01-01 | End: 2018-06-02

## 2017-01-01 RX ORDER — INSULIN ASPART 100 [IU]/ML
10 INJECTION, SOLUTION INTRAVENOUS; SUBCUTANEOUS
Status: DISCONTINUED | OUTPATIENT
Start: 2017-01-01 | End: 2017-01-01

## 2017-01-01 RX ORDER — HYDROCODONE BITARTRATE AND ACETAMINOPHEN 500; 5 MG/1; MG/1
TABLET ORAL
Status: DISCONTINUED | OUTPATIENT
Start: 2017-01-01 | End: 2017-01-01 | Stop reason: HOSPADM

## 2017-01-01 RX ORDER — GLUCAGON 1 MG
1 KIT INJECTION
Status: DISCONTINUED | OUTPATIENT
Start: 2017-01-01 | End: 2017-01-01

## 2017-01-01 RX ORDER — PREDNISONE 20 MG/1
60 TABLET ORAL EVERY 12 HOURS
Status: DISCONTINUED | OUTPATIENT
Start: 2017-01-01 | End: 2017-01-01 | Stop reason: HOSPADM

## 2017-01-01 RX ORDER — DULOXETIN HYDROCHLORIDE 60 MG/1
60 CAPSULE, DELAYED RELEASE ORAL DAILY
Status: DISCONTINUED | OUTPATIENT
Start: 2017-01-01 | End: 2017-01-01 | Stop reason: HOSPADM

## 2017-01-01 RX ORDER — ROSUVASTATIN CALCIUM 10 MG/1
10 TABLET, COATED ORAL DAILY
COMMUNITY
End: 2017-01-01 | Stop reason: DRUGHIGH

## 2017-01-01 RX ORDER — FLUCONAZOLE 200 MG/1
200 TABLET ORAL DAILY
Status: DISCONTINUED | OUTPATIENT
Start: 2017-01-01 | End: 2017-01-01

## 2017-01-01 RX ORDER — MAGNESIUM SULFATE HEPTAHYDRATE 40 MG/ML
2 INJECTION, SOLUTION INTRAVENOUS ONCE
Status: COMPLETED | OUTPATIENT
Start: 2017-01-01 | End: 2017-01-01

## 2017-01-01 RX ORDER — CLOPIDOGREL BISULFATE 75 MG/1
75 TABLET ORAL DAILY
Qty: 30 TABLET | Refills: 11 | Status: SHIPPED | OUTPATIENT
Start: 2017-01-01 | End: 2018-06-02

## 2017-01-01 RX ORDER — INSULIN ASPART 100 [IU]/ML
1-10 INJECTION, SOLUTION INTRAVENOUS; SUBCUTANEOUS EVERY 6 HOURS PRN
Status: DISCONTINUED | OUTPATIENT
Start: 2017-01-01 | End: 2017-01-01

## 2017-01-01 RX ORDER — GLUCAGON 1 MG
1 KIT INJECTION
Status: DISCONTINUED | OUTPATIENT
Start: 2017-01-01 | End: 2017-01-01 | Stop reason: HOSPADM

## 2017-01-01 RX ORDER — OXYCODONE HYDROCHLORIDE 5 MG/1
5 TABLET ORAL EVERY 6 HOURS PRN
Status: DISCONTINUED | OUTPATIENT
Start: 2017-01-01 | End: 2017-01-01

## 2017-01-01 RX ORDER — INSULIN ASPART 100 [IU]/ML
0-5 INJECTION, SOLUTION INTRAVENOUS; SUBCUTANEOUS EVERY 6 HOURS PRN
Status: DISCONTINUED | OUTPATIENT
Start: 2017-01-01 | End: 2017-01-01

## 2017-01-01 RX ORDER — GABAPENTIN 300 MG/1
300 CAPSULE ORAL 3 TIMES DAILY
COMMUNITY

## 2017-01-01 RX ORDER — IBUPROFEN 200 MG
16 TABLET ORAL
Status: DISCONTINUED | OUTPATIENT
Start: 2017-01-01 | End: 2017-01-01

## 2017-01-01 RX ORDER — POTASSIUM CHLORIDE 20 MEQ/15ML
60 SOLUTION ORAL ONCE
Status: COMPLETED | OUTPATIENT
Start: 2017-01-01 | End: 2017-01-01

## 2017-01-01 RX ORDER — IBUPROFEN 200 MG
24 TABLET ORAL
Status: DISCONTINUED | OUTPATIENT
Start: 2017-01-01 | End: 2017-01-01 | Stop reason: HOSPADM

## 2017-01-01 RX ORDER — PANTOPRAZOLE SODIUM 40 MG/10ML
40 INJECTION, POWDER, LYOPHILIZED, FOR SOLUTION INTRAVENOUS 2 TIMES DAILY
Status: DISCONTINUED | OUTPATIENT
Start: 2017-01-01 | End: 2017-01-01

## 2017-01-01 RX ORDER — INSULIN ASPART 100 [IU]/ML
27 INJECTION, SOLUTION INTRAVENOUS; SUBCUTANEOUS
Status: DISCONTINUED | OUTPATIENT
Start: 2017-01-01 | End: 2017-01-01

## 2017-01-01 RX ORDER — CARVEDILOL 12.5 MG/1
12.5 TABLET ORAL 2 TIMES DAILY
Qty: 60 TABLET | Refills: 1 | Status: ON HOLD | OUTPATIENT
Start: 2017-01-01 | End: 2017-01-01 | Stop reason: HOSPADM

## 2017-01-01 RX ORDER — ONDANSETRON 4 MG/1
4 TABLET, ORALLY DISINTEGRATING ORAL ONCE
Status: DISCONTINUED | OUTPATIENT
Start: 2017-01-01 | End: 2017-01-01

## 2017-01-01 RX ORDER — PROPOFOL 10 MG/ML
VIAL (ML) INTRAVENOUS CONTINUOUS PRN
Status: DISCONTINUED | OUTPATIENT
Start: 2017-01-01 | End: 2017-01-01

## 2017-01-01 RX ORDER — METFORMIN HYDROCHLORIDE 500 MG/1
500 TABLET ORAL 2 TIMES DAILY WITH MEALS
Status: ON HOLD | COMMUNITY
End: 2017-01-01 | Stop reason: HOSPADM

## 2017-01-01 RX ORDER — INSULIN ASPART 100 [IU]/ML
0-5 INJECTION, SOLUTION INTRAVENOUS; SUBCUTANEOUS EVERY 4 HOURS PRN
Status: DISCONTINUED | OUTPATIENT
Start: 2017-01-01 | End: 2017-01-01

## 2017-01-01 RX ORDER — AMLODIPINE BESYLATE 10 MG/1
10 TABLET ORAL DAILY
Status: DISCONTINUED | OUTPATIENT
Start: 2017-01-01 | End: 2017-01-01 | Stop reason: HOSPADM

## 2017-01-01 RX ORDER — DEXTROSE MONOHYDRATE 50 MG/ML
INJECTION, SOLUTION INTRAVENOUS CONTINUOUS
Status: DISCONTINUED | OUTPATIENT
Start: 2017-01-01 | End: 2017-01-01

## 2017-01-01 RX ORDER — ROSUVASTATIN CALCIUM 20 MG/1
20 TABLET, COATED ORAL NIGHTLY
Status: DISCONTINUED | OUTPATIENT
Start: 2017-01-01 | End: 2017-01-01 | Stop reason: HOSPADM

## 2017-01-01 RX ORDER — HYDROCHLOROTHIAZIDE 12.5 MG/1
12.5 TABLET ORAL DAILY
Status: ON HOLD | COMMUNITY
End: 2017-01-01 | Stop reason: ALTCHOICE

## 2017-01-01 RX ORDER — CLOPIDOGREL BISULFATE 75 MG/1
75 TABLET ORAL DAILY
Status: DISCONTINUED | OUTPATIENT
Start: 2017-01-01 | End: 2017-01-01 | Stop reason: HOSPADM

## 2017-01-01 RX ORDER — INSULIN ASPART 100 [IU]/ML
1-10 INJECTION, SOLUTION INTRAVENOUS; SUBCUTANEOUS
Status: DISCONTINUED | OUTPATIENT
Start: 2017-01-01 | End: 2017-01-01 | Stop reason: HOSPADM

## 2017-01-01 RX ORDER — PANTOPRAZOLE SODIUM 40 MG/1
40 TABLET, DELAYED RELEASE ORAL DAILY
Status: DISCONTINUED | OUTPATIENT
Start: 2017-01-01 | End: 2017-01-01 | Stop reason: HOSPADM

## 2017-01-01 RX ORDER — ASPIRIN 81 MG/1
81 TABLET ORAL DAILY
COMMUNITY

## 2017-01-01 RX ORDER — ROSUVASTATIN CALCIUM 40 MG/1
40 TABLET, COATED ORAL NIGHTLY
Status: DISCONTINUED | OUTPATIENT
Start: 2017-01-01 | End: 2017-01-01 | Stop reason: HOSPADM

## 2017-01-01 RX ORDER — FUROSEMIDE 20 MG/1
20 TABLET ORAL 2 TIMES DAILY
Status: DISCONTINUED | OUTPATIENT
Start: 2017-01-01 | End: 2017-01-01

## 2017-01-01 RX ORDER — PREDNISONE 20 MG/1
20 TABLET ORAL DAILY
Status: DISCONTINUED | OUTPATIENT
Start: 2017-01-01 | End: 2017-01-01 | Stop reason: HOSPADM

## 2017-01-01 RX ORDER — ACETAMINOPHEN 325 MG/1
650 TABLET ORAL EVERY 4 HOURS PRN
Status: DISCONTINUED | OUTPATIENT
Start: 2017-01-01 | End: 2017-01-01 | Stop reason: HOSPADM

## 2017-01-01 RX ORDER — INSULIN ASPART 100 [IU]/ML
30 INJECTION, SOLUTION INTRAVENOUS; SUBCUTANEOUS
Refills: 0
Start: 2017-01-01 | End: 2018-10-26

## 2017-01-01 RX ORDER — PSEUDOEPHEDRINE/ACETAMINOPHEN 30MG-500MG
100 TABLET ORAL
Status: COMPLETED | OUTPATIENT
Start: 2017-01-01 | End: 2017-01-01

## 2017-01-01 RX ORDER — HYDROCODONE BITARTRATE AND ACETAMINOPHEN 5; 325 MG/1; MG/1
1 TABLET ORAL EVERY 4 HOURS PRN
Status: DISCONTINUED | OUTPATIENT
Start: 2017-01-01 | End: 2017-01-01

## 2017-01-01 RX ORDER — ISOSORBIDE MONONITRATE 30 MG/1
30 TABLET, EXTENDED RELEASE ORAL DAILY
Qty: 30 TABLET | Refills: 11 | Status: SHIPPED | OUTPATIENT
Start: 2017-01-01 | End: 2018-10-25

## 2017-01-01 RX ORDER — FUROSEMIDE 10 MG/ML
INJECTION INTRAMUSCULAR; INTRAVENOUS
Status: DISPENSED
Start: 2017-01-01 | End: 2017-01-01

## 2017-01-01 RX ORDER — AMLODIPINE BESYLATE 10 MG/1
10 TABLET ORAL DAILY
COMMUNITY

## 2017-01-01 RX ORDER — CEFEPIME HYDROCHLORIDE 2 G/50ML
2 INJECTION, SOLUTION INTRAVENOUS
Status: DISCONTINUED | OUTPATIENT
Start: 2017-01-01 | End: 2017-01-01

## 2017-01-01 RX ORDER — IPRATROPIUM BROMIDE AND ALBUTEROL SULFATE 2.5; .5 MG/3ML; MG/3ML
3 SOLUTION RESPIRATORY (INHALATION) EVERY 4 HOURS
Status: DISCONTINUED | OUTPATIENT
Start: 2017-01-01 | End: 2017-01-01

## 2017-01-01 RX ORDER — FLUCONAZOLE 100 MG/1
100 TABLET ORAL DAILY
Status: DISCONTINUED | OUTPATIENT
Start: 2017-01-01 | End: 2017-01-01 | Stop reason: HOSPADM

## 2017-01-01 RX ORDER — POTASSIUM CHLORIDE 7.45 MG/ML
10 INJECTION INTRAVENOUS
Status: DISCONTINUED | OUTPATIENT
Start: 2017-01-01 | End: 2017-01-01

## 2017-01-01 RX ORDER — NITROGLYCERIN 0.4 MG/1
0.4 TABLET SUBLINGUAL EVERY 5 MIN PRN
Status: DISCONTINUED | OUTPATIENT
Start: 2017-01-01 | End: 2017-01-01 | Stop reason: HOSPADM

## 2017-01-01 RX ORDER — CARVEDILOL 3.12 MG/1
3.12 TABLET ORAL 2 TIMES DAILY
Status: DISCONTINUED | OUTPATIENT
Start: 2017-01-01 | End: 2017-01-01

## 2017-01-01 RX ORDER — FERROUS SULFATE 325(65) MG
325 TABLET, DELAYED RELEASE (ENTERIC COATED) ORAL DAILY
Status: DISCONTINUED | OUTPATIENT
Start: 2017-01-01 | End: 2017-01-01 | Stop reason: HOSPADM

## 2017-01-01 RX ORDER — INSULIN ASPART 100 [IU]/ML
1-10 INJECTION, SOLUTION INTRAVENOUS; SUBCUTANEOUS
Status: DISCONTINUED | OUTPATIENT
Start: 2017-01-01 | End: 2017-01-01

## 2017-01-01 RX ORDER — NAPROXEN SODIUM 220 MG/1
81 TABLET, FILM COATED ORAL DAILY
Status: DISCONTINUED | OUTPATIENT
Start: 2017-01-01 | End: 2017-01-01

## 2017-01-01 RX ORDER — VASOPRESSIN 20 [USP'U]/ML
40 INJECTION, SOLUTION INTRAMUSCULAR; SUBCUTANEOUS ONCE
Status: DISCONTINUED | OUTPATIENT
Start: 2017-01-01 | End: 2017-01-01 | Stop reason: HOSPADM

## 2017-01-01 RX ORDER — FLUCONAZOLE 100 MG/1
100 TABLET ORAL DAILY
Status: DISCONTINUED | OUTPATIENT
Start: 2017-01-01 | End: 2017-01-01

## 2017-01-01 RX ORDER — METOPROLOL TARTRATE 25 MG/1
25 TABLET, FILM COATED ORAL 2 TIMES DAILY
Status: DISCONTINUED | OUTPATIENT
Start: 2017-01-01 | End: 2017-01-01

## 2017-01-01 RX ORDER — INSULIN ASPART 100 [IU]/ML
5 INJECTION, SOLUTION INTRAVENOUS; SUBCUTANEOUS ONCE
Status: COMPLETED | OUTPATIENT
Start: 2017-01-01 | End: 2017-01-01

## 2017-01-01 RX ORDER — FUROSEMIDE 10 MG/ML
40 INJECTION INTRAMUSCULAR; INTRAVENOUS ONCE
Status: COMPLETED | OUTPATIENT
Start: 2017-01-01 | End: 2017-01-01

## 2017-01-01 RX ORDER — FUROSEMIDE 10 MG/ML
80 INJECTION INTRAMUSCULAR; INTRAVENOUS ONCE
Status: COMPLETED | OUTPATIENT
Start: 2017-01-01 | End: 2017-01-01

## 2017-01-01 RX ORDER — FERROUS SULFATE 325(65) MG
325 TABLET, DELAYED RELEASE (ENTERIC COATED) ORAL DAILY
COMMUNITY

## 2017-01-01 RX ORDER — ENOXAPARIN SODIUM 100 MG/ML
40 INJECTION SUBCUTANEOUS EVERY 24 HOURS
Status: DISCONTINUED | OUTPATIENT
Start: 2017-01-01 | End: 2017-01-01

## 2017-01-01 RX ORDER — HEPARIN SODIUM 5000 [USP'U]/ML
5000 INJECTION, SOLUTION INTRAVENOUS; SUBCUTANEOUS EVERY 8 HOURS
Status: DISCONTINUED | OUTPATIENT
Start: 2017-01-01 | End: 2017-01-01 | Stop reason: HOSPADM

## 2017-01-01 RX ORDER — IBUPROFEN 200 MG
24 TABLET ORAL
Status: DISCONTINUED | OUTPATIENT
Start: 2017-01-01 | End: 2017-01-01

## 2017-01-01 RX ORDER — FUROSEMIDE 10 MG/ML
40 INJECTION INTRAMUSCULAR; INTRAVENOUS 2 TIMES DAILY
Status: DISCONTINUED | OUTPATIENT
Start: 2017-01-01 | End: 2017-01-01

## 2017-01-01 RX ORDER — FLUCONAZOLE 200 MG/1
400 TABLET ORAL ONCE
Status: DISCONTINUED | OUTPATIENT
Start: 2017-01-01 | End: 2017-01-01 | Stop reason: SDUPTHER

## 2017-01-01 RX ORDER — INSULIN ASPART 100 [IU]/ML
0-5 INJECTION, SOLUTION INTRAVENOUS; SUBCUTANEOUS
Status: DISCONTINUED | OUTPATIENT
Start: 2017-01-01 | End: 2017-01-01

## 2017-01-01 RX ORDER — LISINOPRIL 10 MG/1
10 TABLET ORAL DAILY
Status: ON HOLD | COMMUNITY
End: 2017-01-01 | Stop reason: ALTCHOICE

## 2017-01-01 RX ORDER — PANTOPRAZOLE SODIUM 40 MG/10ML
80 INJECTION, POWDER, LYOPHILIZED, FOR SOLUTION INTRAVENOUS ONCE
Status: COMPLETED | OUTPATIENT
Start: 2017-01-01 | End: 2017-01-01

## 2017-01-01 RX ORDER — GUAIFENESIN 600 MG/1
600 TABLET, EXTENDED RELEASE ORAL 2 TIMES DAILY
Status: DISCONTINUED | OUTPATIENT
Start: 2017-01-01 | End: 2017-01-01

## 2017-01-01 RX ORDER — METHYLPREDNISOLONE SOD SUCC 125 MG
125 VIAL (EA) INJECTION EVERY 6 HOURS
Status: DISCONTINUED | OUTPATIENT
Start: 2017-01-01 | End: 2017-01-01

## 2017-01-01 RX ORDER — FUROSEMIDE 20 MG/1
20 TABLET ORAL 2 TIMES DAILY
COMMUNITY

## 2017-01-01 RX ORDER — CARVEDILOL 25 MG/1
25 TABLET ORAL 2 TIMES DAILY
Status: DISCONTINUED | OUTPATIENT
Start: 2017-01-01 | End: 2017-01-01

## 2017-01-01 RX ORDER — HEPARIN SODIUM 5000 [USP'U]/ML
5000 INJECTION, SOLUTION INTRAVENOUS; SUBCUTANEOUS EVERY 8 HOURS
Status: DISCONTINUED | OUTPATIENT
Start: 2017-01-01 | End: 2017-01-01

## 2017-01-01 RX ORDER — CARVEDILOL 25 MG/1
12.5 TABLET ORAL 2 TIMES DAILY
Qty: 60 TABLET | Refills: 1 | Status: SHIPPED | OUTPATIENT
Start: 2017-01-01 | End: 2017-01-01

## 2017-01-01 RX ORDER — INSULIN ASPART 100 [IU]/ML
15 INJECTION, SOLUTION INTRAVENOUS; SUBCUTANEOUS
Status: ON HOLD | COMMUNITY
End: 2017-01-01 | Stop reason: HOSPADM

## 2017-01-01 RX ORDER — OMEPRAZOLE 20 MG/1
20 CAPSULE, DELAYED RELEASE ORAL 2 TIMES DAILY
Status: ON HOLD | COMMUNITY
End: 2017-01-01 | Stop reason: HOSPADM

## 2017-01-01 RX ORDER — INSULIN ASPART 100 [IU]/ML
20 INJECTION, SOLUTION INTRAVENOUS; SUBCUTANEOUS
Status: DISCONTINUED | OUTPATIENT
Start: 2017-01-01 | End: 2017-01-01

## 2017-01-01 RX ORDER — SODIUM CHLORIDE 0.9 % (FLUSH) 0.9 %
5 SYRINGE (ML) INJECTION
Status: DISCONTINUED | OUTPATIENT
Start: 2017-01-01 | End: 2017-01-01 | Stop reason: HOSPADM

## 2017-01-01 RX ORDER — IPRATROPIUM BROMIDE AND ALBUTEROL SULFATE 2.5; .5 MG/3ML; MG/3ML
3 SOLUTION RESPIRATORY (INHALATION) EVERY 4 HOURS PRN
Status: DISCONTINUED | OUTPATIENT
Start: 2017-01-01 | End: 2017-01-01 | Stop reason: HOSPADM

## 2017-01-01 RX ORDER — INSULIN ASPART 100 [IU]/ML
15 INJECTION, SOLUTION INTRAVENOUS; SUBCUTANEOUS
Status: DISCONTINUED | OUTPATIENT
Start: 2017-01-01 | End: 2017-01-01

## 2017-01-01 RX ORDER — GABAPENTIN 300 MG/1
300 CAPSULE ORAL 3 TIMES DAILY
Status: DISCONTINUED | OUTPATIENT
Start: 2017-01-01 | End: 2017-01-01

## 2017-01-01 RX ORDER — FLUCONAZOLE 200 MG/1
400 TABLET ORAL ONCE
Status: COMPLETED | OUTPATIENT
Start: 2017-01-01 | End: 2017-01-01

## 2017-01-01 RX ORDER — ROSUVASTATIN CALCIUM 20 MG/1
20 TABLET, COATED ORAL NIGHTLY
Qty: 90 TABLET | Refills: 1 | Status: SHIPPED | OUTPATIENT
Start: 2017-01-01 | End: 2018-06-02

## 2017-01-01 RX ORDER — METOPROLOL TARTRATE 25 MG/1
25 TABLET, FILM COATED ORAL 2 TIMES DAILY
Qty: 60 TABLET | Refills: 11 | Status: SHIPPED | OUTPATIENT
Start: 2017-01-01 | End: 2018-10-24

## 2017-01-01 RX ORDER — DULOXETIN HYDROCHLORIDE 60 MG/1
60 CAPSULE, DELAYED RELEASE ORAL DAILY
COMMUNITY

## 2017-01-01 RX ORDER — SODIUM CHLORIDE 9 MG/ML
INJECTION, SOLUTION INTRAVENOUS CONTINUOUS PRN
Status: DISCONTINUED | OUTPATIENT
Start: 2017-01-01 | End: 2017-01-01

## 2017-01-01 RX ORDER — FERROUS SULFATE 325(65) MG
325 TABLET, DELAYED RELEASE (ENTERIC COATED) ORAL DAILY
Status: DISCONTINUED | OUTPATIENT
Start: 2017-01-01 | End: 2017-01-01

## 2017-01-01 RX ORDER — INSULIN ASPART 100 [IU]/ML
5 INJECTION, SOLUTION INTRAVENOUS; SUBCUTANEOUS ONCE
Status: DISCONTINUED | OUTPATIENT
Start: 2017-01-01 | End: 2017-01-01

## 2017-01-01 RX ORDER — METOPROLOL TARTRATE 25 MG/1
25 TABLET, FILM COATED ORAL 2 TIMES DAILY
Status: DISCONTINUED | OUTPATIENT
Start: 2017-01-01 | End: 2017-01-01 | Stop reason: HOSPADM

## 2017-01-01 RX ORDER — PANTOPRAZOLE SODIUM 40 MG/10ML
80 INJECTION, POWDER, LYOPHILIZED, FOR SOLUTION INTRAVENOUS
Status: DISCONTINUED | OUTPATIENT
Start: 2017-01-01 | End: 2017-01-01 | Stop reason: HOSPADM

## 2017-01-01 RX ORDER — ROSUVASTATIN CALCIUM 10 MG/1
10 TABLET, COATED ORAL NIGHTLY
Status: DISCONTINUED | OUTPATIENT
Start: 2017-01-01 | End: 2017-01-01

## 2017-01-01 RX ORDER — HEPARIN SODIUM 1000 [USP'U]/ML
5000 INJECTION, SOLUTION INTRAVENOUS; SUBCUTANEOUS EVERY 8 HOURS
Status: DISCONTINUED | OUTPATIENT
Start: 2017-01-01 | End: 2017-01-01

## 2017-01-01 RX ORDER — CLOPIDOGREL BISULFATE 75 MG/1
75 TABLET ORAL DAILY
Status: DISCONTINUED | OUTPATIENT
Start: 2017-01-01 | End: 2017-01-01

## 2017-01-01 RX ORDER — POTASSIUM CHLORIDE 20 MEQ/15ML
60 SOLUTION ORAL ONCE
Status: DISCONTINUED | OUTPATIENT
Start: 2017-01-01 | End: 2017-01-01

## 2017-01-01 RX ORDER — PREDNISONE 20 MG/1
60 TABLET ORAL EVERY 12 HOURS
Qty: 120 TABLET | Refills: 0 | Status: SHIPPED | OUTPATIENT
Start: 2017-01-01 | End: 2017-11-13

## 2017-01-01 RX ORDER — GABAPENTIN 300 MG/1
300 CAPSULE ORAL 3 TIMES DAILY
Status: DISCONTINUED | OUTPATIENT
Start: 2017-01-01 | End: 2017-01-01 | Stop reason: HOSPADM

## 2017-01-01 RX ORDER — INSULIN ASPART 100 [IU]/ML
25 INJECTION, SOLUTION INTRAVENOUS; SUBCUTANEOUS
Status: DISCONTINUED | OUTPATIENT
Start: 2017-01-01 | End: 2017-01-01

## 2017-01-01 RX ORDER — PANTOPRAZOLE SODIUM 40 MG/10ML
40 INJECTION, POWDER, LYOPHILIZED, FOR SOLUTION INTRAVENOUS DAILY
Status: DISCONTINUED | OUTPATIENT
Start: 2017-01-01 | End: 2017-01-01

## 2017-01-01 RX ORDER — CALCIUM GLUCONATE 98 MG/ML
INJECTION, SOLUTION INTRAVENOUS
Status: DISCONTINUED
Start: 2017-01-01 | End: 2017-01-01 | Stop reason: WASHOUT

## 2017-01-01 RX ORDER — PANTOPRAZOLE SODIUM 40 MG/1
40 TABLET, DELAYED RELEASE ORAL DAILY
Status: DISCONTINUED | OUTPATIENT
Start: 2017-01-01 | End: 2017-01-01

## 2017-01-01 RX ORDER — INSULIN ASPART 100 [IU]/ML
12 INJECTION, SOLUTION INTRAVENOUS; SUBCUTANEOUS
Status: DISCONTINUED | OUTPATIENT
Start: 2017-01-01 | End: 2017-01-01 | Stop reason: HOSPADM

## 2017-01-01 RX ORDER — INSULIN ASPART 100 [IU]/ML
1-10 INJECTION, SOLUTION INTRAVENOUS; SUBCUTANEOUS EVERY 4 HOURS
Status: DISCONTINUED | OUTPATIENT
Start: 2017-01-01 | End: 2017-01-01

## 2017-01-01 RX ORDER — LORAZEPAM 2 MG/ML
2 INJECTION INTRAMUSCULAR ONCE
Status: COMPLETED | OUTPATIENT
Start: 2017-01-01 | End: 2017-01-01

## 2017-01-01 RX ORDER — POTASSIUM CHLORIDE 20 MEQ/1
40 TABLET, EXTENDED RELEASE ORAL ONCE
Status: DISCONTINUED | OUTPATIENT
Start: 2017-01-01 | End: 2017-01-01

## 2017-01-01 RX ORDER — FLUCONAZOLE 100 MG/1
100 TABLET ORAL DAILY
Qty: 13 TABLET | Refills: 0 | Status: SHIPPED | OUTPATIENT
Start: 2017-01-01 | End: 2017-11-06

## 2017-01-01 RX ORDER — HEPARIN SODIUM,PORCINE/D5W 25000/250
12 INTRAVENOUS SOLUTION INTRAVENOUS CONTINUOUS
Status: DISCONTINUED | OUTPATIENT
Start: 2017-01-01 | End: 2017-01-01

## 2017-01-01 RX ORDER — SYRING-NEEDL,DISP,INSUL,0.3 ML 29 G X1/2"
300 SYRINGE, EMPTY DISPOSABLE MISCELLANEOUS
Status: COMPLETED | OUTPATIENT
Start: 2017-01-01 | End: 2017-01-01

## 2017-01-01 RX ORDER — INSULIN ASPART 100 [IU]/ML
27 INJECTION, SOLUTION INTRAVENOUS; SUBCUTANEOUS 3 TIMES DAILY
Qty: 24.3 ML | Refills: 11 | Status: SHIPPED | OUTPATIENT
Start: 2017-01-01 | End: 2018-10-24

## 2017-01-01 RX ORDER — POLYETHYLENE GLYCOL 3350 17 G/17G
17 POWDER, FOR SOLUTION ORAL DAILY
Status: DISCONTINUED | OUTPATIENT
Start: 2017-01-01 | End: 2017-01-01 | Stop reason: HOSPADM

## 2017-01-01 RX ORDER — DOPAMINE HYDROCHLORIDE 160 MG/100ML
10 INJECTION, SOLUTION INTRAVENOUS CONTINUOUS
Status: DISCONTINUED | OUTPATIENT
Start: 2017-01-01 | End: 2017-01-01 | Stop reason: HOSPADM

## 2017-01-01 RX ORDER — CARVEDILOL 25 MG/1
25 TABLET ORAL 2 TIMES DAILY
Qty: 60 TABLET | Refills: 1 | Status: SHIPPED | OUTPATIENT
Start: 2017-01-01 | End: 2017-01-01

## 2017-01-01 RX ORDER — PROPOFOL 10 MG/ML
VIAL (ML) INTRAVENOUS
Status: DISCONTINUED | OUTPATIENT
Start: 2017-01-01 | End: 2017-01-01

## 2017-01-01 RX ORDER — MORPHINE SULFATE 2 MG/ML
2 INJECTION, SOLUTION INTRAMUSCULAR; INTRAVENOUS EVERY 6 HOURS PRN
Status: DISCONTINUED | OUTPATIENT
Start: 2017-01-01 | End: 2017-01-01

## 2017-01-01 RX ORDER — ENOXAPARIN SODIUM 100 MG/ML
30 INJECTION SUBCUTANEOUS EVERY 24 HOURS
Status: DISCONTINUED | OUTPATIENT
Start: 2017-01-01 | End: 2017-01-01

## 2017-01-01 RX ORDER — INSULIN GLARGINE 100 [IU]/ML
50 INJECTION, SOLUTION SUBCUTANEOUS NIGHTLY
Status: ON HOLD | COMMUNITY
End: 2017-01-01 | Stop reason: HOSPADM

## 2017-01-01 RX ORDER — DULOXETIN HYDROCHLORIDE 30 MG/1
60 CAPSULE, DELAYED RELEASE ORAL DAILY
Status: DISCONTINUED | OUTPATIENT
Start: 2017-01-01 | End: 2017-01-01 | Stop reason: HOSPADM

## 2017-01-01 RX ORDER — PANTOPRAZOLE SODIUM 40 MG/1
40 TABLET, DELAYED RELEASE ORAL DAILY
Qty: 30 TABLET | Refills: 11 | Status: SHIPPED | OUTPATIENT
Start: 2017-01-01 | End: 2018-10-24

## 2017-01-01 RX ORDER — POTASSIUM CHLORIDE 20 MEQ/15ML
40 SOLUTION ORAL DAILY
Status: DISCONTINUED | OUTPATIENT
Start: 2017-01-01 | End: 2017-01-01

## 2017-01-01 RX ORDER — METHYLPREDNISOLONE SOD SUCC 125 MG
80 VIAL (EA) INJECTION EVERY 12 HOURS
Status: DISCONTINUED | OUTPATIENT
Start: 2017-01-01 | End: 2017-01-01

## 2017-01-01 RX ORDER — ISOSORBIDE MONONITRATE 30 MG/1
30 TABLET, EXTENDED RELEASE ORAL DAILY
Status: DISCONTINUED | OUTPATIENT
Start: 2017-01-01 | End: 2017-01-01 | Stop reason: HOSPADM

## 2017-01-01 RX ORDER — LIDOCAINE HCL/PF 100 MG/5ML
SYRINGE (ML) INTRAVENOUS
Status: DISCONTINUED | OUTPATIENT
Start: 2017-01-01 | End: 2017-01-01

## 2017-01-01 RX ORDER — FLUCONAZOLE 200 MG/1
200 TABLET ORAL DAILY
Status: DISCONTINUED | OUTPATIENT
Start: 2017-01-01 | End: 2017-01-01 | Stop reason: SDUPTHER

## 2017-01-01 RX ORDER — CARVEDILOL 12.5 MG/1
12.5 TABLET ORAL 2 TIMES DAILY
Status: DISCONTINUED | OUTPATIENT
Start: 2017-01-01 | End: 2017-01-01 | Stop reason: HOSPADM

## 2017-01-01 RX ADMIN — PREDNISONE 60 MG: 20 TABLET ORAL at 02:10

## 2017-01-01 RX ADMIN — DULOXETINE 60 MG: 60 CAPSULE, DELAYED RELEASE ORAL at 08:10

## 2017-01-01 RX ADMIN — INSULIN DETEMIR 30 UNITS: 100 INJECTION, SOLUTION SUBCUTANEOUS at 08:10

## 2017-01-01 RX ADMIN — ASPIRIN 81 MG: 81 TABLET, COATED ORAL at 08:10

## 2017-01-01 RX ADMIN — CARVEDILOL 12.5 MG: 12.5 TABLET, FILM COATED ORAL at 08:06

## 2017-01-01 RX ADMIN — IPRATROPIUM BROMIDE AND ALBUTEROL SULFATE 3 ML: .5; 3 SOLUTION RESPIRATORY (INHALATION) at 11:10

## 2017-01-01 RX ADMIN — SODIUM CHLORIDE 250 ML: 0.9 INJECTION, SOLUTION INTRAVENOUS at 12:10

## 2017-01-01 RX ADMIN — IPRATROPIUM BROMIDE AND ALBUTEROL SULFATE 3 ML: .5; 3 SOLUTION RESPIRATORY (INHALATION) at 08:10

## 2017-01-01 RX ADMIN — HEPARIN SODIUM 5000 UNITS: 5000 INJECTION, SOLUTION INTRAVENOUS; SUBCUTANEOUS at 02:10

## 2017-01-01 RX ADMIN — INSULIN DETEMIR 10 UNITS: 100 INJECTION, SOLUTION SUBCUTANEOUS at 05:10

## 2017-01-01 RX ADMIN — BUPROPION HYDROCHLORIDE 100 MG: 100 TABLET, FILM COATED ORAL at 12:10

## 2017-01-01 RX ADMIN — INSULIN ASPART 5 UNITS: 100 INJECTION, SOLUTION INTRAVENOUS; SUBCUTANEOUS at 09:06

## 2017-01-01 RX ADMIN — GABAPENTIN 300 MG: 300 CAPSULE ORAL at 06:06

## 2017-01-01 RX ADMIN — BUPROPION HYDROCHLORIDE 100 MG: 100 TABLET, FILM COATED ORAL at 09:10

## 2017-01-01 RX ADMIN — PREDNISONE 60 MG: 20 TABLET ORAL at 08:10

## 2017-01-01 RX ADMIN — BUPROPION HYDROCHLORIDE 100 MG: 100 TABLET, FILM COATED ORAL at 08:10

## 2017-01-01 RX ADMIN — GABAPENTIN 300 MG: 300 CAPSULE ORAL at 05:10

## 2017-01-01 RX ADMIN — Medication 3 ML: at 01:10

## 2017-01-01 RX ADMIN — IPRATROPIUM BROMIDE AND ALBUTEROL SULFATE 3 ML: .5; 3 SOLUTION RESPIRATORY (INHALATION) at 07:10

## 2017-01-01 RX ADMIN — HEPARIN SODIUM 5000 UNITS: 5000 INJECTION, SOLUTION INTRAVENOUS; SUBCUTANEOUS at 09:06

## 2017-01-01 RX ADMIN — INSULIN ASPART 3 UNITS: 100 INJECTION, SOLUTION INTRAVENOUS; SUBCUTANEOUS at 06:10

## 2017-01-01 RX ADMIN — CLOPIDOGREL 75 MG: 75 TABLET, FILM COATED ORAL at 09:06

## 2017-01-01 RX ADMIN — HEPARIN SODIUM 5000 UNITS: 5000 INJECTION, SOLUTION INTRAVENOUS; SUBCUTANEOUS at 02:06

## 2017-01-01 RX ADMIN — DULOXETINE 60 MG: 30 CAPSULE, DELAYED RELEASE ORAL at 08:06

## 2017-01-01 RX ADMIN — INSULIN ASPART 10 UNITS: 100 INJECTION, SOLUTION INTRAVENOUS; SUBCUTANEOUS at 09:10

## 2017-01-01 RX ADMIN — METOPROLOL TARTRATE 25 MG: 25 TABLET ORAL at 08:10

## 2017-01-01 RX ADMIN — INSULIN ASPART 27 UNITS: 100 INJECTION, SOLUTION INTRAVENOUS; SUBCUTANEOUS at 08:10

## 2017-01-01 RX ADMIN — FUROSEMIDE 40 MG: 10 INJECTION, SOLUTION INTRAVENOUS at 05:05

## 2017-01-01 RX ADMIN — PANTOPRAZOLE SODIUM 40 MG: 40 TABLET, DELAYED RELEASE ORAL at 12:10

## 2017-01-01 RX ADMIN — Medication 3 ML: at 05:10

## 2017-01-01 RX ADMIN — AMLODIPINE BESYLATE 10 MG: 10 TABLET ORAL at 08:10

## 2017-01-01 RX ADMIN — POLYETHYLENE GLYCOL 3350 17 G: 17 POWDER, FOR SOLUTION ORAL at 08:10

## 2017-01-01 RX ADMIN — IPRATROPIUM BROMIDE AND ALBUTEROL SULFATE 3 ML: .5; 3 SOLUTION RESPIRATORY (INHALATION) at 12:10

## 2017-01-01 RX ADMIN — INSULIN ASPART 10 UNITS: 100 INJECTION, SOLUTION INTRAVENOUS; SUBCUTANEOUS at 01:10

## 2017-01-01 RX ADMIN — HEPARIN SODIUM 5000 UNITS: 5000 INJECTION, SOLUTION INTRAVENOUS; SUBCUTANEOUS at 05:05

## 2017-01-01 RX ADMIN — INSULIN ASPART 27 UNITS: 100 INJECTION, SOLUTION INTRAVENOUS; SUBCUTANEOUS at 12:10

## 2017-01-01 RX ADMIN — INSULIN ASPART 6 UNITS: 100 INJECTION, SOLUTION INTRAVENOUS; SUBCUTANEOUS at 05:10

## 2017-01-01 RX ADMIN — FUROSEMIDE 80 MG: 10 INJECTION, SOLUTION INTRAVENOUS at 09:10

## 2017-01-01 RX ADMIN — AMLODIPINE BESYLATE 10 MG: 10 TABLET ORAL at 08:06

## 2017-01-01 RX ADMIN — PREDNISONE 60 MG: 20 TABLET ORAL at 10:10

## 2017-01-01 RX ADMIN — HEPARIN SODIUM 5000 UNITS: 5000 INJECTION, SOLUTION INTRAVENOUS; SUBCUTANEOUS at 09:10

## 2017-01-01 RX ADMIN — ROSUVASTATIN CALCIUM 40 MG: 40 TABLET ORAL at 08:05

## 2017-01-01 RX ADMIN — CLOPIDOGREL 75 MG: 75 TABLET, FILM COATED ORAL at 12:10

## 2017-01-01 RX ADMIN — CARVEDILOL 12.5 MG: 12.5 TABLET, FILM COATED ORAL at 09:06

## 2017-01-01 RX ADMIN — HEPARIN SODIUM 5000 UNITS: 5000 INJECTION, SOLUTION INTRAVENOUS; SUBCUTANEOUS at 06:10

## 2017-01-01 RX ADMIN — ROSUVASTATIN CALCIUM 40 MG: 40 TABLET ORAL at 08:06

## 2017-01-01 RX ADMIN — INSULIN ASPART 27 UNITS: 100 INJECTION, SOLUTION INTRAVENOUS; SUBCUTANEOUS at 05:10

## 2017-01-01 RX ADMIN — NITROGLYCERIN 0.4 MG: 0.4 TABLET SUBLINGUAL at 09:10

## 2017-01-01 RX ADMIN — METOPROLOL TARTRATE 25 MG: 25 TABLET ORAL at 09:10

## 2017-01-01 RX ADMIN — ASPIRIN 81 MG CHEWABLE TABLET 81 MG: 81 TABLET CHEWABLE at 09:10

## 2017-01-01 RX ADMIN — DULOXETINE 60 MG: 60 CAPSULE, DELAYED RELEASE ORAL at 09:10

## 2017-01-01 RX ADMIN — ROSUVASTATIN CALCIUM 20 MG: 5 TABLET ORAL at 08:10

## 2017-01-01 RX ADMIN — FERROUS SULFATE TAB EC 325 MG (65 MG FE EQUIVALENT) 325 MG: 325 (65 FE) TABLET DELAYED RESPONSE at 08:10

## 2017-01-01 RX ADMIN — Medication 3 ML: at 02:10

## 2017-01-01 RX ADMIN — INSULIN ASPART 6 UNITS: 100 INJECTION, SOLUTION INTRAVENOUS; SUBCUTANEOUS at 02:10

## 2017-01-01 RX ADMIN — ROSUVASTATIN CALCIUM 20 MG: 5 TABLET ORAL at 09:10

## 2017-01-01 RX ADMIN — FERROUS SULFATE TAB EC 325 MG (65 MG FE EQUIVALENT) 325 MG: 325 (65 FE) TABLET DELAYED RESPONSE at 08:06

## 2017-01-01 RX ADMIN — HEPARIN SODIUM 5000 UNITS: 5000 INJECTION, SOLUTION INTRAVENOUS; SUBCUTANEOUS at 01:10

## 2017-01-01 RX ADMIN — SODIUM CHLORIDE 2.5 UNITS/HR: 9 INJECTION, SOLUTION INTRAVENOUS at 10:10

## 2017-01-01 RX ADMIN — PROPOFOL 100 MCG/KG/MIN: 10 INJECTION, EMULSION INTRAVENOUS at 10:10

## 2017-01-01 RX ADMIN — INSULIN DETEMIR 15 UNITS: 100 INJECTION, SOLUTION SUBCUTANEOUS at 10:10

## 2017-01-01 RX ADMIN — PREDNISONE 30 MG: 20 TABLET ORAL at 08:06

## 2017-01-01 RX ADMIN — INSULIN ASPART 5 UNITS: 100 INJECTION, SOLUTION INTRAVENOUS; SUBCUTANEOUS at 12:10

## 2017-01-01 RX ADMIN — PANTOPRAZOLE SODIUM 40 MG: 40 TABLET, DELAYED RELEASE ORAL at 09:10

## 2017-01-01 RX ADMIN — CARVEDILOL 25 MG: 25 TABLET, FILM COATED ORAL at 08:06

## 2017-01-01 RX ADMIN — FERROUS SULFATE TAB EC 325 MG (65 MG FE EQUIVALENT) 325 MG: 325 (65 FE) TABLET DELAYED RESPONSE at 12:10

## 2017-01-01 RX ADMIN — PREDNISONE 20 MG: 20 TABLET ORAL at 08:06

## 2017-01-01 RX ADMIN — INSULIN ASPART 5 UNITS: 100 INJECTION, SOLUTION INTRAVENOUS; SUBCUTANEOUS at 09:10

## 2017-01-01 RX ADMIN — Medication 3 ML: at 10:10

## 2017-01-01 RX ADMIN — IPRATROPIUM BROMIDE AND ALBUTEROL SULFATE 3 ML: .5; 3 SOLUTION RESPIRATORY (INHALATION) at 06:10

## 2017-01-01 RX ADMIN — POTASSIUM CHLORIDE 10 MEQ: 10 INJECTION, SOLUTION INTRAVENOUS at 11:10

## 2017-01-01 RX ADMIN — INSULIN ASPART 12 UNITS: 100 INJECTION, SOLUTION INTRAVENOUS; SUBCUTANEOUS at 05:05

## 2017-01-01 RX ADMIN — HEPARIN SODIUM 5000 UNITS: 5000 INJECTION, SOLUTION INTRAVENOUS; SUBCUTANEOUS at 08:10

## 2017-01-01 RX ADMIN — FUROSEMIDE 40 MG: 10 INJECTION, SOLUTION INTRAVENOUS at 05:06

## 2017-01-01 RX ADMIN — INSULIN ASPART 10 UNITS: 100 INJECTION, SOLUTION INTRAVENOUS; SUBCUTANEOUS at 02:10

## 2017-01-01 RX ADMIN — FUROSEMIDE 80 MG: 10 INJECTION, SOLUTION INTRAVENOUS at 02:10

## 2017-01-01 RX ADMIN — INSULIN ASPART 3 UNITS: 100 INJECTION, SOLUTION INTRAVENOUS; SUBCUTANEOUS at 09:06

## 2017-01-01 RX ADMIN — IPRATROPIUM BROMIDE AND ALBUTEROL SULFATE 3 ML: .5; 3 SOLUTION RESPIRATORY (INHALATION) at 10:10

## 2017-01-01 RX ADMIN — Medication 3 ML: at 09:10

## 2017-01-01 RX ADMIN — GABAPENTIN 300 MG: 300 CAPSULE ORAL at 09:05

## 2017-01-01 RX ADMIN — INSULIN ASPART 12 UNITS: 100 INJECTION, SOLUTION INTRAVENOUS; SUBCUTANEOUS at 05:06

## 2017-01-01 RX ADMIN — INSULIN ASPART 2 UNITS: 100 INJECTION, SOLUTION INTRAVENOUS; SUBCUTANEOUS at 08:10

## 2017-01-01 RX ADMIN — HEPARIN SODIUM 5000 UNITS: 5000 INJECTION, SOLUTION INTRAVENOUS; SUBCUTANEOUS at 06:06

## 2017-01-01 RX ADMIN — CLOPIDOGREL 75 MG: 75 TABLET, FILM COATED ORAL at 08:10

## 2017-01-01 RX ADMIN — LORAZEPAM 2 MG: 2 INJECTION INTRAMUSCULAR; INTRAVENOUS at 03:10

## 2017-01-01 RX ADMIN — ISOSORBIDE MONONITRATE 30 MG: 60 TABLET, EXTENDED RELEASE ORAL at 08:10

## 2017-01-01 RX ADMIN — ACETAMINOPHEN 650 MG: 325 TABLET ORAL at 12:06

## 2017-01-01 RX ADMIN — INSULIN ASPART 10 UNITS: 100 INJECTION, SOLUTION INTRAVENOUS; SUBCUTANEOUS at 12:10

## 2017-01-01 RX ADMIN — DEXTROSE MONOHYDRATE 25 G: 25 INJECTION, SOLUTION INTRAVENOUS at 06:10

## 2017-01-01 RX ADMIN — GUAIFENESIN AND DEXTROMETHORPHAN HYDROBROMIDE 1 TABLET: 600; 30 TABLET, EXTENDED RELEASE ORAL at 09:06

## 2017-01-01 RX ADMIN — FUROSEMIDE 80 MG: 10 INJECTION, SOLUTION INTRAVENOUS at 11:10

## 2017-01-01 RX ADMIN — GABAPENTIN 300 MG: 300 CAPSULE ORAL at 03:05

## 2017-01-01 RX ADMIN — PREDNISONE 60 MG: 20 TABLET ORAL at 12:10

## 2017-01-01 RX ADMIN — FLUCONAZOLE 200 MG: 200 TABLET ORAL at 09:10

## 2017-01-01 RX ADMIN — ASPIRIN 81 MG CHEWABLE TABLET 81 MG: 81 TABLET CHEWABLE at 08:10

## 2017-01-01 RX ADMIN — INSULIN ASPART 2 UNITS: 100 INJECTION, SOLUTION INTRAVENOUS; SUBCUTANEOUS at 12:10

## 2017-01-01 RX ADMIN — INSULIN ASPART 10 UNITS: 100 INJECTION, SOLUTION INTRAVENOUS; SUBCUTANEOUS at 06:10

## 2017-01-01 RX ADMIN — IPRATROPIUM BROMIDE AND ALBUTEROL SULFATE 3 ML: .5; 3 SOLUTION RESPIRATORY (INHALATION) at 03:10

## 2017-01-01 RX ADMIN — ISOSORBIDE MONONITRATE 30 MG: 60 TABLET, EXTENDED RELEASE ORAL at 09:10

## 2017-01-01 RX ADMIN — IPRATROPIUM BROMIDE AND ALBUTEROL SULFATE 3 ML: .5; 3 SOLUTION RESPIRATORY (INHALATION) at 01:10

## 2017-01-01 RX ADMIN — HEPARIN SODIUM 5000 UNITS: 5000 INJECTION, SOLUTION INTRAVENOUS; SUBCUTANEOUS at 05:06

## 2017-01-01 RX ADMIN — HEPARIN SODIUM 5000 UNITS: 5000 INJECTION, SOLUTION INTRAVENOUS; SUBCUTANEOUS at 04:10

## 2017-01-01 RX ADMIN — INSULIN DETEMIR 55 UNITS: 100 INJECTION, SOLUTION SUBCUTANEOUS at 07:10

## 2017-01-01 RX ADMIN — MORPHINE SULFATE 2 MG: 2 INJECTION, SOLUTION INTRAMUSCULAR; INTRAVENOUS at 11:10

## 2017-01-01 RX ADMIN — POTASSIUM CHLORIDE 60 MEQ: 20 SOLUTION ORAL at 06:10

## 2017-01-01 RX ADMIN — SODIUM CHLORIDE 500 ML: 0.9 INJECTION, SOLUTION INTRAVENOUS at 09:10

## 2017-01-01 RX ADMIN — INSULIN ASPART 6 UNITS: 100 INJECTION, SOLUTION INTRAVENOUS; SUBCUTANEOUS at 05:05

## 2017-01-01 RX ADMIN — ASPIRIN 81 MG CHEWABLE TABLET 81 MG: 81 TABLET CHEWABLE at 08:06

## 2017-01-01 RX ADMIN — INSULIN ASPART 30 UNITS: 100 INJECTION, SOLUTION INTRAVENOUS; SUBCUTANEOUS at 06:10

## 2017-01-01 RX ADMIN — INSULIN ASPART 30 UNITS: 100 INJECTION, SOLUTION INTRAVENOUS; SUBCUTANEOUS at 01:10

## 2017-01-01 RX ADMIN — INSULIN ASPART 10 UNITS: 100 INJECTION, SOLUTION INTRAVENOUS; SUBCUTANEOUS at 05:06

## 2017-01-01 RX ADMIN — GABAPENTIN 300 MG: 300 CAPSULE ORAL at 09:06

## 2017-01-01 RX ADMIN — FERROUS SULFATE TAB EC 325 MG (65 MG FE EQUIVALENT) 325 MG: 325 (65 FE) TABLET DELAYED RESPONSE at 09:10

## 2017-01-01 RX ADMIN — INSULIN DETEMIR 55 UNITS: 100 INJECTION, SOLUTION SUBCUTANEOUS at 08:10

## 2017-01-01 RX ADMIN — INSULIN ASPART 6 UNITS: 100 INJECTION, SOLUTION INTRAVENOUS; SUBCUTANEOUS at 05:06

## 2017-01-01 RX ADMIN — MORPHINE SULFATE 2 MG: 2 INJECTION, SOLUTION INTRAMUSCULAR; INTRAVENOUS at 05:10

## 2017-01-01 RX ADMIN — HEPARIN SODIUM 5000 UNITS: 5000 INJECTION, SOLUTION INTRAVENOUS; SUBCUTANEOUS at 05:10

## 2017-01-01 RX ADMIN — HEPARIN SODIUM 5000 UNITS: 5000 INJECTION, SOLUTION INTRAVENOUS; SUBCUTANEOUS at 03:10

## 2017-01-01 RX ADMIN — PREDNISONE 60 MG: 20 TABLET ORAL at 09:10

## 2017-01-01 RX ADMIN — BUPROPION HYDROCHLORIDE 100 MG: 100 TABLET, FILM COATED ORAL at 10:10

## 2017-01-01 RX ADMIN — METOPROLOL TARTRATE 25 MG: 25 TABLET ORAL at 08:05

## 2017-01-01 RX ADMIN — INSULIN ASPART 4 UNITS: 100 INJECTION, SOLUTION INTRAVENOUS; SUBCUTANEOUS at 08:10

## 2017-01-01 RX ADMIN — INSULIN ASPART 20 UNITS: 100 INJECTION, SOLUTION INTRAVENOUS; SUBCUTANEOUS at 12:10

## 2017-01-01 RX ADMIN — GUAIFENESIN AND DEXTROMETHORPHAN HYDROBROMIDE 1 TABLET: 600; 30 TABLET, EXTENDED RELEASE ORAL at 08:06

## 2017-01-01 RX ADMIN — OXYCODONE HYDROCHLORIDE 5 MG: 5 TABLET ORAL at 02:10

## 2017-01-01 RX ADMIN — CEFTRIAXONE 1 G: 1 INJECTION, SOLUTION INTRAVENOUS at 09:10

## 2017-01-01 RX ADMIN — Medication 100 ML: at 09:10

## 2017-01-01 RX ADMIN — PANTOPRAZOLE SODIUM 40 MG: 40 INJECTION, POWDER, FOR SOLUTION INTRAVENOUS at 08:10

## 2017-01-01 RX ADMIN — MAGNESIUM SULFATE IN WATER 2 G: 40 INJECTION, SOLUTION INTRAVENOUS at 06:10

## 2017-01-01 RX ADMIN — ALUMINUM HYDROXIDE, MAGNESIUM HYDROXIDE, AND SIMETHICONE 50 ML: 200; 200; 20 SUSPENSION ORAL at 10:10

## 2017-01-01 RX ADMIN — INSULIN ASPART 12 UNITS: 100 INJECTION, SOLUTION INTRAVENOUS; SUBCUTANEOUS at 01:06

## 2017-01-01 RX ADMIN — INSULIN ASPART 5 UNITS: 100 INJECTION, SOLUTION INTRAVENOUS; SUBCUTANEOUS at 02:10

## 2017-01-01 RX ADMIN — Medication 5 UNITS: at 05:10

## 2017-01-01 RX ADMIN — Medication 3 ML: at 09:06

## 2017-01-01 RX ADMIN — PROPOFOL 50 MG: 10 INJECTION, EMULSION INTRAVENOUS at 10:10

## 2017-01-01 RX ADMIN — IPRATROPIUM BROMIDE AND ALBUTEROL SULFATE 3 ML: .5; 3 SOLUTION RESPIRATORY (INHALATION) at 04:10

## 2017-01-01 RX ADMIN — Medication 3 ML: at 06:06

## 2017-01-01 RX ADMIN — LIDOCAINE HYDROCHLORIDE 50 MG: 20 INJECTION, SOLUTION INTRAVENOUS at 11:10

## 2017-01-01 RX ADMIN — INSULIN DETEMIR 55 UNITS: 100 INJECTION, SOLUTION SUBCUTANEOUS at 09:10

## 2017-01-01 RX ADMIN — POTASSIUM CHLORIDE 60 MEQ: 20 SOLUTION ORAL at 04:10

## 2017-01-01 RX ADMIN — PANTOPRAZOLE SODIUM 40 MG: 40 INJECTION, POWDER, FOR SOLUTION INTRAVENOUS at 03:10

## 2017-01-01 RX ADMIN — SODIUM CHLORIDE 4 UNITS/HR: 9 INJECTION, SOLUTION INTRAVENOUS at 09:10

## 2017-01-01 RX ADMIN — PANTOPRAZOLE SODIUM 40 MG: 40 TABLET, DELAYED RELEASE ORAL at 08:10

## 2017-01-01 RX ADMIN — EPHEDRINE SULFATE 10 MG: 50 INJECTION, SOLUTION INTRAMUSCULAR; INTRAVENOUS; SUBCUTANEOUS at 11:10

## 2017-01-01 RX ADMIN — AMLODIPINE BESYLATE 10 MG: 10 TABLET ORAL at 09:10

## 2017-01-01 RX ADMIN — SODIUM CHLORIDE 250 ML: 0.9 INJECTION, SOLUTION INTRAVENOUS at 12:06

## 2017-01-01 RX ADMIN — Medication 3 ML: at 05:06

## 2017-01-01 RX ADMIN — POLYETHYLENE GLYCOL 3350 17 G: 17 POWDER, FOR SOLUTION ORAL at 09:10

## 2017-01-01 RX ADMIN — ASPIRIN 81 MG CHEWABLE TABLET 81 MG: 81 TABLET CHEWABLE at 12:10

## 2017-01-01 RX ADMIN — DIBASIC SODIUM PHOSPHATE, MONOBASIC POTASSIUM PHOSPHATE AND MONOBASIC SODIUM PHOSPHATE 2 TABLET: 852; 155; 130 TABLET ORAL at 12:10

## 2017-01-01 RX ADMIN — AMLODIPINE BESYLATE 10 MG: 10 TABLET ORAL at 04:10

## 2017-01-01 RX ADMIN — INSULIN ASPART 3 UNITS: 100 INJECTION, SOLUTION INTRAVENOUS; SUBCUTANEOUS at 01:10

## 2017-01-01 RX ADMIN — INSULIN DETEMIR 10 UNITS: 100 INJECTION, SOLUTION SUBCUTANEOUS at 09:10

## 2017-01-01 RX ADMIN — DIBASIC SODIUM PHOSPHATE, MONOBASIC POTASSIUM PHOSPHATE AND MONOBASIC SODIUM PHOSPHATE 2 TABLET: 852; 155; 130 TABLET ORAL at 11:10

## 2017-01-01 RX ADMIN — FUROSEMIDE 80 MG: 10 INJECTION, SOLUTION INTRAVENOUS at 08:10

## 2017-01-01 RX ADMIN — GABAPENTIN 300 MG: 300 CAPSULE ORAL at 02:06

## 2017-01-01 RX ADMIN — CLOPIDOGREL 75 MG: 75 TABLET, FILM COATED ORAL at 04:10

## 2017-01-01 RX ADMIN — BUPROPION HYDROCHLORIDE 100 MG: 100 TABLET, FILM COATED ORAL at 11:10

## 2017-01-01 RX ADMIN — INSULIN ASPART 20 UNITS: 100 INJECTION, SOLUTION INTRAVENOUS; SUBCUTANEOUS at 09:10

## 2017-01-01 RX ADMIN — GABAPENTIN 300 MG: 300 CAPSULE ORAL at 02:10

## 2017-01-01 RX ADMIN — FUROSEMIDE 80 MG: 10 INJECTION, SOLUTION INTRAVENOUS at 06:10

## 2017-01-01 RX ADMIN — IPRATROPIUM BROMIDE AND ALBUTEROL SULFATE 3 ML: .5; 3 SOLUTION RESPIRATORY (INHALATION) at 09:10

## 2017-01-01 RX ADMIN — ROSUVASTATIN CALCIUM 20 MG: 5 TABLET ORAL at 10:10

## 2017-01-01 RX ADMIN — PROMETHAZINE HYDROCHLORIDE 12.5 MG: 25 INJECTION INTRAMUSCULAR; INTRAVENOUS at 11:10

## 2017-01-01 RX ADMIN — INSULIN ASPART 12 UNITS: 100 INJECTION, SOLUTION INTRAVENOUS; SUBCUTANEOUS at 08:06

## 2017-01-01 RX ADMIN — METHYLPREDNISOLONE SODIUM SUCCINATE 125 MG: 125 INJECTION, POWDER, FOR SOLUTION INTRAMUSCULAR; INTRAVENOUS at 12:10

## 2017-01-01 RX ADMIN — GABAPENTIN 300 MG: 300 CAPSULE ORAL at 06:10

## 2017-01-01 RX ADMIN — INSULIN ASPART 2 UNITS: 100 INJECTION, SOLUTION INTRAVENOUS; SUBCUTANEOUS at 09:10

## 2017-01-01 RX ADMIN — INSULIN ASPART 10 UNITS: 100 INJECTION, SOLUTION INTRAVENOUS; SUBCUTANEOUS at 08:10

## 2017-01-01 RX ADMIN — PREDNISONE 60 MG: 20 TABLET ORAL at 11:10

## 2017-01-01 RX ADMIN — CLOPIDOGREL 75 MG: 75 TABLET, FILM COATED ORAL at 09:10

## 2017-01-01 RX ADMIN — SODIUM CHLORIDE 4 UNITS/HR: 9 INJECTION, SOLUTION INTRAVENOUS at 05:10

## 2017-01-01 RX ADMIN — INSULIN HUMAN 9 UNITS: 100 INJECTION, SOLUTION PARENTERAL at 11:10

## 2017-01-01 RX ADMIN — INSULIN ASPART 5 UNITS: 100 INJECTION, SOLUTION INTRAVENOUS; SUBCUTANEOUS at 11:10

## 2017-01-01 RX ADMIN — Medication 3 ML: at 02:06

## 2017-01-01 RX ADMIN — ASPIRIN 81 MG CHEWABLE TABLET 81 MG: 81 TABLET CHEWABLE at 09:06

## 2017-01-01 RX ADMIN — SODIUM CHLORIDE 18.4 UNITS/HR: 9 INJECTION, SOLUTION INTRAVENOUS at 11:10

## 2017-01-01 RX ADMIN — INSULIN DETEMIR 50 UNITS: 100 INJECTION, SOLUTION SUBCUTANEOUS at 01:10

## 2017-01-01 RX ADMIN — INSULIN ASPART 27 UNITS: 100 INJECTION, SOLUTION INTRAVENOUS; SUBCUTANEOUS at 06:10

## 2017-01-01 RX ADMIN — INSULIN DETEMIR 40 UNITS: 100 INJECTION, SOLUTION SUBCUTANEOUS at 08:06

## 2017-01-01 RX ADMIN — HEPARIN SODIUM 5000 UNITS: 5000 INJECTION, SOLUTION INTRAVENOUS; SUBCUTANEOUS at 10:10

## 2017-01-01 RX ADMIN — INSULIN DETEMIR 20 UNITS: 100 INJECTION, SOLUTION SUBCUTANEOUS at 01:10

## 2017-01-01 RX ADMIN — NITROGLYCERIN 0.4 MG: 0.4 TABLET SUBLINGUAL at 12:10

## 2017-01-01 RX ADMIN — FLUCONAZOLE 400 MG: 200 TABLET ORAL at 08:10

## 2017-01-01 RX ADMIN — INSULIN ASPART 3 UNITS: 100 INJECTION, SOLUTION INTRAVENOUS; SUBCUTANEOUS at 11:10

## 2017-01-01 RX ADMIN — POLYETHYLENE GLYCOL 3350 17 G: 17 POWDER, FOR SOLUTION ORAL at 04:10

## 2017-01-01 RX ADMIN — INSULIN ASPART 30 UNITS: 100 INJECTION, SOLUTION INTRAVENOUS; SUBCUTANEOUS at 09:10

## 2017-01-01 RX ADMIN — INSULIN ASPART 2 UNITS: 100 INJECTION, SOLUTION INTRAVENOUS; SUBCUTANEOUS at 01:06

## 2017-01-01 RX ADMIN — DEXTROSE MONOHYDRATE 12.5 G: 25 INJECTION, SOLUTION INTRAVENOUS at 02:10

## 2017-01-01 RX ADMIN — CEFTRIAXONE 1 G: 1 INJECTION, SOLUTION INTRAVENOUS at 10:10

## 2017-01-01 RX ADMIN — INSULIN DETEMIR 24 UNITS: 100 INJECTION, SOLUTION SUBCUTANEOUS at 11:10

## 2017-01-01 RX ADMIN — GABAPENTIN 300 MG: 300 CAPSULE ORAL at 05:06

## 2017-01-01 RX ADMIN — ROSUVASTATIN CALCIUM 40 MG: 40 TABLET ORAL at 09:06

## 2017-01-01 RX ADMIN — POTASSIUM CHLORIDE 40 MEQ: 20 SOLUTION ORAL at 08:10

## 2017-01-01 RX ADMIN — DEXTROSE MONOHYDRATE: 5 INJECTION, SOLUTION INTRAVENOUS at 07:10

## 2017-01-01 RX ADMIN — Medication 3 ML: at 06:10

## 2017-01-01 RX ADMIN — POTASSIUM CHLORIDE 10 MEQ: 10 INJECTION, SOLUTION INTRAVENOUS at 09:10

## 2017-01-01 RX ADMIN — METHYLPREDNISOLONE SODIUM SUCCINATE 125 MG: 125 INJECTION, POWDER, FOR SOLUTION INTRAMUSCULAR; INTRAVENOUS at 05:10

## 2017-01-01 RX ADMIN — INSULIN ASPART 5 UNITS: 100 INJECTION, SOLUTION INTRAVENOUS; SUBCUTANEOUS at 06:10

## 2017-01-01 RX ADMIN — INSULIN ASPART 6 UNITS: 100 INJECTION, SOLUTION INTRAVENOUS; SUBCUTANEOUS at 06:10

## 2017-01-01 RX ADMIN — INSULIN ASPART 15 UNITS: 100 INJECTION, SOLUTION INTRAVENOUS; SUBCUTANEOUS at 08:10

## 2017-01-01 RX ADMIN — INSULIN ASPART 27 UNITS: 100 INJECTION, SOLUTION INTRAVENOUS; SUBCUTANEOUS at 02:10

## 2017-01-01 RX ADMIN — FUROSEMIDE 80 MG: 10 INJECTION, SOLUTION INTRAVENOUS at 05:10

## 2017-01-01 RX ADMIN — INSULIN ASPART 25 UNITS: 100 INJECTION, SOLUTION INTRAVENOUS; SUBCUTANEOUS at 05:10

## 2017-01-01 RX ADMIN — Medication 3 ML: at 09:05

## 2017-01-01 RX ADMIN — Medication 3 ML: at 03:10

## 2017-01-01 RX ADMIN — INSULIN ASPART 25 UNITS: 100 INJECTION, SOLUTION INTRAVENOUS; SUBCUTANEOUS at 06:10

## 2017-01-01 RX ADMIN — INSULIN DETEMIR 20 UNITS: 100 INJECTION, SOLUTION SUBCUTANEOUS at 04:10

## 2017-01-01 RX ADMIN — SODIUM CHLORIDE: 0.9 INJECTION, SOLUTION INTRAVENOUS at 10:10

## 2017-01-01 RX ADMIN — DEXTROSE MONOHYDRATE: 5 INJECTION, SOLUTION INTRAVENOUS at 11:10

## 2017-01-01 RX ADMIN — INSULIN ASPART 25 UNITS: 100 INJECTION, SOLUTION INTRAVENOUS; SUBCUTANEOUS at 12:10

## 2017-01-01 RX ADMIN — DULOXETINE 60 MG: 60 CAPSULE, DELAYED RELEASE ORAL at 12:10

## 2017-01-01 RX ADMIN — POTASSIUM PHOSPHATE, MONOBASIC AND POTASSIUM PHOSPHATE, DIBASIC 20 MMOL: 224; 236 INJECTION, SOLUTION, CONCENTRATE INTRAVENOUS at 01:10

## 2017-01-01 RX ADMIN — INSULIN ASPART 20 UNITS: 100 INJECTION, SOLUTION INTRAVENOUS; SUBCUTANEOUS at 08:10

## 2017-01-01 RX ADMIN — DEXTROSE MONOHYDRATE 12.5 G: 25 INJECTION, SOLUTION INTRAVENOUS at 10:10

## 2017-01-01 RX ADMIN — INSULIN ASPART 2 UNITS: 100 INJECTION, SOLUTION INTRAVENOUS; SUBCUTANEOUS at 08:06

## 2017-01-01 RX ADMIN — INSULIN ASPART 4 UNITS: 100 INJECTION, SOLUTION INTRAVENOUS; SUBCUTANEOUS at 09:05

## 2017-01-01 RX ADMIN — INSULIN ASPART 4 UNITS: 100 INJECTION, SOLUTION INTRAVENOUS; SUBCUTANEOUS at 12:10

## 2017-01-01 RX ADMIN — INSULIN ASPART 27 UNITS: 100 INJECTION, SOLUTION INTRAVENOUS; SUBCUTANEOUS at 09:10

## 2017-01-01 RX ADMIN — INSULIN DETEMIR 5 UNITS: 100 INJECTION, SOLUTION SUBCUTANEOUS at 02:10

## 2017-01-01 RX ADMIN — SODIUM CHLORIDE 250 ML: 0.9 INJECTION, SOLUTION INTRAVENOUS at 02:06

## 2017-01-01 RX ADMIN — MAGESIUM CITRATE 300 ML: 1.75 LIQUID ORAL at 09:10

## 2017-01-01 RX ADMIN — INSULIN ASPART 25 UNITS: 100 INJECTION, SOLUTION INTRAVENOUS; SUBCUTANEOUS at 08:10

## 2017-01-01 RX ADMIN — VANCOMYCIN HYDROCHLORIDE 1750 MG: 750 INJECTION, POWDER, LYOPHILIZED, FOR SOLUTION INTRAVENOUS at 09:10

## 2017-01-01 RX ADMIN — IPRATROPIUM BROMIDE AND ALBUTEROL SULFATE 3 ML: .5; 3 SOLUTION RESPIRATORY (INHALATION) at 05:10

## 2017-01-01 RX ADMIN — FLUCONAZOLE 100 MG: 100 TABLET ORAL at 09:10

## 2017-01-01 RX ADMIN — ROSUVASTATIN CALCIUM 20 MG: 5 TABLET ORAL at 11:10

## 2017-01-01 RX ADMIN — INSULIN ASPART 3 UNITS: 100 INJECTION, SOLUTION INTRAVENOUS; SUBCUTANEOUS at 10:10

## 2017-01-01 RX ADMIN — INSULIN ASPART 2 UNITS: 100 INJECTION, SOLUTION INTRAVENOUS; SUBCUTANEOUS at 06:10

## 2017-01-01 RX ADMIN — Medication 3 ML: at 08:10

## 2017-01-01 RX ADMIN — SODIUM CHLORIDE 4.5 UNITS/HR: 9 INJECTION, SOLUTION INTRAVENOUS at 02:10

## 2017-01-01 RX ADMIN — PANTOPRAZOLE SODIUM 80 MG: 40 INJECTION, POWDER, FOR SOLUTION INTRAVENOUS at 12:10

## 2017-01-01 RX ADMIN — AMLODIPINE BESYLATE 10 MG: 10 TABLET ORAL at 12:10

## 2017-01-01 RX ADMIN — CLOPIDOGREL 75 MG: 75 TABLET, FILM COATED ORAL at 08:06

## 2017-01-01 RX ADMIN — Medication 3 ML: at 02:05

## 2017-01-01 RX ADMIN — GABAPENTIN 300 MG: 300 CAPSULE ORAL at 10:10

## 2017-01-01 RX ADMIN — ONDANSETRON 4 MG: 2 INJECTION, SOLUTION INTRAMUSCULAR; INTRAVENOUS at 08:10

## 2017-01-01 RX ADMIN — POTASSIUM CHLORIDE 10 MEQ: 10 INJECTION, SOLUTION INTRAVENOUS at 12:10

## 2017-01-01 RX ADMIN — HEPARIN SODIUM 5000 UNITS: 5000 INJECTION, SOLUTION INTRAVENOUS; SUBCUTANEOUS at 09:05

## 2017-05-30 PROBLEM — I25.10 CAD (CORONARY ARTERY DISEASE): Status: ACTIVE | Noted: 2017-01-01

## 2017-05-31 PROBLEM — E11.40 DIABETIC NEUROPATHY: Status: ACTIVE | Noted: 2017-01-01

## 2017-05-31 PROBLEM — I21.4 NSTEMI (NON-ST ELEVATED MYOCARDIAL INFARCTION): Status: ACTIVE | Noted: 2017-01-01

## 2017-05-31 PROBLEM — E11.00 UNCONTROLLED TYPE 2 DIABETES MELLITUS WITH HYPEROSMOLARITY, WITH LONG-TERM CURRENT USE OF INSULIN: Status: ACTIVE | Noted: 2017-01-01

## 2017-05-31 PROBLEM — Z79.4 UNCONTROLLED TYPE 2 DIABETES MELLITUS WITH HYPEROSMOLARITY, WITH LONG-TERM CURRENT USE OF INSULIN: Status: ACTIVE | Noted: 2017-01-01

## 2017-05-31 PROBLEM — I10 HTN (HYPERTENSION): Status: ACTIVE | Noted: 2017-01-01

## 2017-05-31 PROBLEM — I25.10 3-VESSEL CORONARY ARTERY DISEASE: Status: ACTIVE | Noted: 2017-01-01

## 2017-05-31 PROBLEM — J84.10 PULMONARY FIBROSIS: Status: ACTIVE | Noted: 2017-01-01

## 2017-05-31 PROBLEM — E78.5 HLD (HYPERLIPIDEMIA): Status: ACTIVE | Noted: 2017-01-01

## 2017-05-31 NOTE — H&P
[facility]  Cardiology CCU  History and Physical     Patient Name: Edie Hays  MRN: [id number]  Admission Date: 5/31/2017  Code Status: Full Code   Attending Provider: Lon Yusuf MD   Primary Care Physician: Primary Doctor No  Principal Problem:CAD (coronary artery disease)    Patient information was obtained from past medical records.     Subjective:     Chief Complaint:  3 vessel disease, NSTEMI      HPI:   This is a 72 year old lady with history of cryptogenic pneumonia on home O2, HTN, HLD, DM type II on insulin, who is transferred from [facility] for further workup and management of 3 vessel disease. She presented to the transferring hospital on 5/27/2017 with progressive SOB over one day. She was found to be in acute hypoxic resp failure, ADHF, and NSTEMI. She was started on IV lasix and BiPAP. She was taken for LHC on 5/29/2017 and found to have 20% LM stenosis, mid LAD 95% stenosis, and 72% LCx ostial stenosis seen on IVUS. She was started on ACS protocol with lovenox for NSTEMI. She was transferred today for evaluation of 3 vessel disease for CABG with possible IABP support. Today she reports having exertional dyspnea, no chest pain, no palpitations, fevers or chills.       Review of patient's allergies indicates:   Allergen Reactions    Codeine     Lortab [hydrocodone-acetaminophen]     Penicillins        No current facility-administered medications on file prior to encounter.      No current outpatient prescriptions on file prior to encounter.     Family History     None        Social History Main Topics    Smoking status: Not on file    Smokeless tobacco: Not on file    Alcohol use Not on file    Drug use: Unknown    Sexual activity: Not on file     ROS   Constitutional: no fever or chills  ENT: no nasal congestion or sore throat  Respiratory: + clear productive cough + shortness of breath  Cardiovascular: no chest pain or palpitations  Gastrointestinal:  no nausea or vomiting, no abdominal pain or abdominal distention   Genitourinary: no hematuria or dysuria  Integument/Breast: no rash or pruritis  Hematologic/Lymphatic: no easy bruising or lymphadenopathy  Musculoskeletal: no arthralgias or myalgias  Neurological: no seizures or tremors  Endocrine: + cold intolerance, no polyuria    Objective:     Vital Signs (Most Recent):  Temp: 98.9 °F (37.2 °C) (05/31/17 1215)  Pulse: 74 (05/31/17 1245)  Resp: 12 (05/31/17 1245)  BP: (!) 151/68 (05/31/17 1245)  SpO2: 100 % (05/31/17 1245) Vital Signs (24h Range):  Temp:  [98.9 °F (37.2 °C)] 98.9 °F (37.2 °C)  Pulse:  [72-74] 74  Resp:  [12-26] 12  SpO2:  [100 %] 100 %  BP: (151-159)/(68-74) 151/68     Weight: 86 kg (189 lb 9.5 oz)  Body mass index is 31.55 kg/m².    SpO2: 100 %       No intake or output data in the 24 hours ending 05/31/17 1534    Lines/Drains/Airways     Peripheral Intravenous Line                 Peripheral IV - Single Lumen 05/31/17 1441 Left Hand less than 1 day         Peripheral IV - Single Lumen 05/31/17 1442 Left Forearm less than 1 day                Physical Exam  General: well developed, well nourished, grunting, NAD  Eyes: conjunctivae/corneas clear. PERRL.  Neck: supple, symmetrical, trachea midline, no JVD  Cardiovascular: Heart: regular rate and rhythm, S1, S2 normal, no murmur, click, rub or gallop.  Lungs: bibasilar clear to auscultation bilaterally and normal respiratory effort  Chest Wall: no tenderness  Abdomen/Rectal: Abdomen: Non distended. + BS. No masses. No TTP. No rebound or guarding. Negative Santos's sign. Rectal: not examined  Extremities: no edema, redness or tenderness in the calves or thighs. Pulses: 2+ and symmetric  Skin: Skin color, texture, turgor normal. No rashes or lesions  Musculoskeletal: full range of motion of joints  Lymph Nodes: No cervical or supraclavicular adenopathy  Psych/Behavioral: Normal. Alert and oriented, appropriate affect.    Significant Labs:   Recent  Results (from the past 24 hour(s))   2D echo with color flow doppler    Collection Time: 05/31/17 12:20 PM   Result Value Ref Range    EF 60 55 - 65    Mitral Valve Regurgitation TRIVIAL     Diastolic Dysfunction Yes (A)     Est. PA Systolic Pressure 36     Tricuspid Valve Regurgitation TRIVIAL    CBC auto differential    Collection Time: 05/31/17 12:44 PM   Result Value Ref Range    WBC 14.68 (H) 3.90 - 12.70 K/uL    RBC 4.04 4.00 - 5.40 M/uL    Hemoglobin 10.9 (L) 12.0 - 16.0 g/dL    Hematocrit 32.2 (L) 37.0 - 48.5 %    MCV 80 (L) 82 - 98 fL    MCH 27.0 27.0 - 31.0 pg    MCHC 33.9 32.0 - 36.0 %    RDW 14.3 11.5 - 14.5 %    Platelets 197 150 - 350 K/uL    MPV 8.7 (L) 9.2 - 12.9 fL    Gran # 13.3 (H) 1.8 - 7.7 K/uL    Lymph # 0.9 (L) 1.0 - 4.8 K/uL    Mono # 0.3 0.3 - 1.0 K/uL    Eos # 0.2 0.0 - 0.5 K/uL    Baso # 0.01 0.00 - 0.20 K/uL    Gran% 90.7 (H) 38.0 - 73.0 %    Lymph% 5.9 (L) 18.0 - 48.0 %    Mono% 2.0 (L) 4.0 - 15.0 %    Eosinophil% 1.1 0.0 - 8.0 %    Basophil% 0.1 0.0 - 1.9 %    Differential Method Automated    Comprehensive metabolic panel    Collection Time: 05/31/17 12:44 PM   Result Value Ref Range    Sodium 136 136 - 145 mmol/L    Potassium 4.6 3.5 - 5.1 mmol/L    Chloride 98 95 - 110 mmol/L    CO2 28 23 - 29 mmol/L    Glucose 228 (H) 70 - 110 mg/dL    BUN, Bld 40 (H) 8 - 23 mg/dL    Creatinine 1.6 (H) 0.5 - 1.4 mg/dL    Calcium 8.5 (L) 8.7 - 10.5 mg/dL    Total Protein 6.4 6.0 - 8.4 g/dL    Albumin 2.3 (L) 3.5 - 5.2 g/dL    Total Bilirubin 0.4 0.1 - 1.0 mg/dL    Alkaline Phosphatase 115 55 - 135 U/L    AST 18 10 - 40 U/L    ALT 18 10 - 44 U/L    Anion Gap 10 8 - 16 mmol/L    eGFR if African American 37.4 (A) >60 mL/min/1.73 m^2    eGFR if non  32.4 (A) >60 mL/min/1.73 m^2   Phosphorus    Collection Time: 05/31/17 12:44 PM   Result Value Ref Range    Phosphorus 4.1 2.7 - 4.5 mg/dL   Protime-INR    Collection Time: 05/31/17 12:44 PM   Result Value Ref Range    Prothrombin Time 10.3 9.0  - 12.5 sec    INR 1.0 0.8 - 1.2   Brain natriuretic peptide    Collection Time: 05/31/17 12:44 PM   Result Value Ref Range     (H) 0 - 99 pg/mL   Troponin I    Collection Time: 05/31/17 12:44 PM   Result Value Ref Range    Troponin I 3.060 (H) 0.000 - 0.026 ng/mL   Lipid panel - if not done in ED    Collection Time: 05/31/17 12:44 PM   Result Value Ref Range    Cholesterol 160 120 - 199 mg/dL    Triglycerides 87 30 - 150 mg/dL    HDL 70 40 - 75 mg/dL    LDL Cholesterol 72.6 63.0 - 159.0 mg/dL    HDL/Chol Ratio 43.8 20.0 - 50.0 %    Total Cholesterol/HDL Ratio 2.3 2.0 - 5.0    Non-HDL Cholesterol 90 mg/dL   TSH - if not done in the ED    Collection Time: 05/31/17 12:44 PM   Result Value Ref Range    TSH 0.592 0.400 - 4.000 uIU/mL   T4    Collection Time: 05/31/17 12:44 PM   Result Value Ref Range    T4, Total 4.5 4.5 - 11.5 ug/dL   Magnesium    Collection Time: 05/31/17 12:44 PM   Result Value Ref Range    Magnesium 2.2 1.6 - 2.6 mg/dL   Anti-Xa Heparin Monitoring    Collection Time: 05/31/17 12:44 PM   Result Value Ref Range    Heparin Anti-Xa 1.66 (H) 0.30 - 0.70 IU/mL       Significant Imaging:   CXR:   The heart is not enlarged.  Accentuation of interstitial markings identified.  No significant airspace consolidation or pleural effusion.      Assessment and Plan:     Active Diagnoses:    Diagnosis Date Noted POA    PRINCIPAL PROBLEM:  CAD (coronary artery disease) [I25.10] 05/30/2017 Yes    3-vessel coronary artery disease [I25.10] 05/31/2017 Unknown    HTN (hypertension) [I10] 05/31/2017 Unknown    HLD (hyperlipidemia) [E78.5] 05/31/2017 Unknown    Pulmonary fibrosis [J84.10] 05/31/2017 Unknown    NSTEMI (non-ST elevated myocardial infarction) [I21.4] 05/31/2017 Unknown    Uncontrolled type 2 diabetes mellitus with hyperosmolarity, with long-term current use of insulin [E11.00, Z79.4] 05/31/2017 Not Applicable    Diabetic neuropathy [E11.40] 05/31/2017 Unknown      Problems Resolved During this  Admission:    Diagnosis Date Noted Date Resolved POA       Neuro:   -- alert and oriented  -- no acute issues    Resp:  #Cryptogenic pneumonia:  -- diagnosed one month ago at [facility]  -- on steroid taper from 60mg to 30mg daily, continue taper 10mg every 3 days  -- continue supplemental oxygen, currently with good sat's on minimal O2    Cards:  #Acute decompensated heart failure:  -- suspect diastolic CHF although patient had reportedly had EF 35% that later recovered, 2D echo ordered pending read  -- continue lasix 40mg bid as CXR with findings of pulmonary edema  -- coreg 3.125mg bid, ASA 81mg daily  -- cardiac diet with fluid restriction    #NSTEMI:  Elevated troponin:  -- trop 3 here  -- LHC at OSH with LAD, RCA and LCx disease. LHC disc reviewed, patient does not meet criteria for CABG  -- medical management and obtain PET stress test  -- high dose statin with Crestor 40mg qhs  -- BB with coreg, continue ASA  -- d/c heparin gtt    Renal:  #JOSE:  -- unknown Cr baseline, Cr 1.6 here  -- renal impairment could be contributing to elevated troponin levels  -- strict I's/O's, monitor for improvement with lasix and consider holding for worsening renal impairment    Endocrine:  #DM type II on insulin:  -- poorly controlled at OSH thought to be due to being on steroids for cryptogenic pneumonia  -- on lantus 50u daily and 15u tid novolog with meals  -- dose reduction detemir 40u and 12u aspart tidwm  -- MDSSI      Hem/onc:  #Anemia:  -- H/H stable, continue to monitor    #Leukocytosis:  -- likely steroid induced as patient with no s&s of infection  -- continue to follow, but it is down trending since admission to OSH    FENGI:  -- no acute issues      Code: full  Diet: cardiac, renal, diabetic  dvt ppx: SQH and SCD      Case and plan was discussed with Dr. Yusuf        VTE Risk Mitigation         Ordered     heparin (porcine) injection 5,000 Units  Every 8 hours     Route:  Subcutaneous        05/31/17 0838      Medium Risk of VTE  Once      05/31/17 1221     Place sequential compression device  Until discontinued      05/31/17 1221          JOAQUÍN Stiles  Cardiology   [facility]

## 2017-05-31 NOTE — PROGRESS NOTES
Pt arrived to 3073 via [facility] from [facility]. Dr. Yusuf's team, Cardiology paged to notify of pt's arrival. Pt on heparin ggt and O2 upon arrival. Placed on continuous cardiac monitoring and pulse ox.

## 2017-06-01 NOTE — PROVIDER TRANSFER
Transfer Note     Primary Team: Networked reference to record PCT  Room Number: 3073/3073 A     Patient Name: Edie Hays MRN: [id number]     Date of Birth: [id number] Allergies: Codeine; Lortab [hydrocodone-acetaminophen]; and Penicillins     Age: 72 y.o. Admit Date: 5/31/2017     Sex: female  BMI: Body mass index is 31.59 kg/m².     Code Status: Full Code        Illness Level (current clinical status): No    Reason for Admission: CAD (coronary artery disease)    Brief HPI and Hospital Course:  This is a 72 year old lady with history of cryptogenic pneumonia on home O2, HTN, HLD, DM type II on insulin, who is transferred from [facility] for further workup and management of 3 vessel disease. She presented to the transferring hospital on 5/27/2017 with progressive SOB over one day. She was found to be in acute hypoxic resp failure, ADHF, and NSTEMI. She was started on IV lasix and BiPAP. She was taken for LHC on 5/29/2017 and found to have 20% LM stenosis, mid LAD 95% stenosis, and 72% LCx ostial stenosis seen on IVUS. She was started on ACS protocol with lovenox for NSTEMI. She was transferred to [facility] for evaluation of 3 vessel disease for CABG with possible IABP support. Cath images from OSH reviewed and patient does not meet criteria for CABG. Patient has elevated troponin that appears to be due to a trop leak in addition to renal impairment. Patient was scheduled for PET stress test on 6/1/2017.       Procedure Date: PET stress test 6/1/2017.      Tasks     #Cryptogenic pneumonia:  -- diagnosed one month ago at [facility] sent home on oxygen  -- on steroid taper from 60mg to 30mg daily, continue taper 10mg every 3 days     #Acute decompensated heart failure, HFpEF:  -- acute decompensated failure resolved clinically and by lasix trial last 24 hours  -- suspect diastolic CHF although patient had reportedly had EF 35% that later recovered, 2D echo here with normal EF of 60% and DD.  -- d/c  lasix as patient appears to be euvolemic \  -- increase coreg 12.5 mg bid, continue ASA 81mg daily  -- cardiac diet with fluid restriction     #NSTEMI:  Elevated troponin:  -- trop 3 here on admission  -- LHC at OSH with LAD, RCA and LCx disease. LHC disc reviewed, patient does not meet criteria for CABG  -- medical management and follow PET stress test from 6/1/2017  -- high dose statin with Crestor 40mg qhs  -- BB with coreg, continue ASA      #JOSE:  -- unknown Cr baseline, Cr 1.7 today BUN rising with lasix and inadequate UOP, d/c diuresis  -- renal impairment could be contributing to elevated troponin levels  -- control BP   -- strict I's/O's, monitor for improvement with lasix and consider holding for worsening renal impairment         JOAQUÍN Segura, PGY-2  [phone]

## 2017-06-01 NOTE — PROGRESS NOTES
Spoke with Dany with [facility] Medicine. Patient to be stepped down to the floor.      JOAQUÍN Segura, PGY-2  [phone]

## 2017-06-01 NOTE — PROGRESS NOTES
[facility]  Cardiology  Progress Note    Patient Name: Edie Hays  MRN: [id number]  Admission Date: 5/31/2017  Hospital Length of Stay: 1 days  Code Status: Full Code   Attending Physician: Lon Yusuf MD   Primary Care Physician: Primary Doctor No  Expected Discharge Date:   Principal Problem:CAD (coronary artery disease)    Subjective:     Hospital Course: admitted on 5/31/2017 for 3 vessel disease. 2D echo revealing HFpEF with EF of 60%. Ordering PET stress scan to further evaluate coronary perfusion.    Interval History:     NAEON. Elevated BP. Still having some coughing. Has no active complaints.     ROS   Constitutional: no fever or chills  ENT: no nasal congestion or sore throat  Respiratory: + clear productive cough + shortness of breath  Cardiovascular: no chest pain or palpitations  Gastrointestinal: no nausea or vomiting, no abdominal pain or abdominal distention   Genitourinary: no hematuria or dysuria  Integument/Breast: no rash or pruritis  Hematologic/Lymphatic: no easy bruising or lymphadenopathy  Musculoskeletal: no arthralgias or myalgias  Neurological: no seizures or tremors  Endocrine: + cold intolerance, no polyuria  Objective:     Vital Signs (Most Recent):  Temp: 98.3 °F (36.8 °C) (06/01/17 0700)  Pulse: 66 (06/01/17 0700)  Resp: 20 (06/01/17 0700)  BP: (!) 174/78 (06/01/17 0700)  SpO2: 100 % (06/01/17 0700) Vital Signs (24h Range):  Temp:  [97.5 °F (36.4 °C)-98.9 °F (37.2 °C)] 98.3 °F (36.8 °C)  Pulse:  [61-86] 66  Resp:  [12-50] 20  SpO2:  [94 %-100 %] 100 %  BP: (129-177)/(57-79) 174/78     Weight: 86.1 kg (189 lb 13.1 oz)  Body mass index is 31.59 kg/m².    SpO2: 100 %  O2 Device (Oxygen Therapy): nasal cannula      Intake/Output Summary (Last 24 hours) at 06/01/17 0752  Last data filed at 06/01/17 0700   Gross per 24 hour   Intake              210 ml   Output              675 ml   Net             -465 ml       Lines/Drains/Airways     Peripheral Intravenous Line                  Peripheral IV - Single Lumen 05/31/17 1441 Left Hand less than 1 day         Peripheral IV - Single Lumen 05/31/17 1442 Left Forearm less than 1 day                Physical Exam  General: well developed, well nourished, grunting, NAD  Eyes: conjunctivae/corneas clear. PERRL.  Neck: supple, symmetrical, trachea midline, no JVD  Cardiovascular: Heart: regular rate and rhythm, S1, S2 normal, no murmur, click, rub or gallop.  Lungs: bibasilar clear to auscultation bilaterally and normal respiratory effort  Chest Wall: no tenderness  Abdomen/Rectal: Abdomen: Non distended. + BS. No masses. No TTP. No rebound or guarding. Negative Santos's sign. Rectal: not examined  Extremities: no edema, redness or tenderness in the calves or thighs. Pulses: 2+ and symmetric  Skin: Skin color, texture, turgor normal. No rashes or lesions  Musculoskeletal: full range of motion of joints  Lymph Nodes: No cervical or supraclavicular adenopathy  Psych/Behavioral: Normal. Alert and oriented, appropriate affect.       Significant Labs:   CMP   Recent Labs  Lab 05/31/17  1244 06/01/17  0300    135*   K 4.6 4.2   CL 98 100   CO2 28 24   * 252*   BUN 40* 45*   CREATININE 1.6* 1.7*   CALCIUM 8.5* 8.1*   PROT 6.4 6.1   ALBUMIN 2.3* 2.2*   BILITOT 0.4 0.3   ALKPHOS 115 114   AST 18 20   ALT 18 21   ANIONGAP 10 11   ESTGFRAFRICA 37.4* 34.7*   EGFRNONAA 32.4* 30.1*   , CBC   Recent Labs  Lab 05/31/17  1244 06/01/17  0300   WBC 14.68* 13.01*   HGB 10.9* 10.7*   HCT 32.2* 31.6*    210   , INR   Recent Labs  Lab 05/31/17  1244   INR 1.0    and Troponin   Recent Labs  Lab 05/31/17  1244   TROPONINI 3.060*       Significant Imaging: Echocardiogram:   2D echo with color flow doppler:   Results for orders placed or performed during the hospital encounter of 05/31/17   2D echo with color flow doppler   Result Value Ref Range    EF 60 55 - 65    Mitral Valve Regurgitation TRIVIAL     Diastolic Dysfunction Yes (A)     Est. PA  Systolic Pressure 36     Tricuspid Valve Regurgitation TRIVIAL      Assessment and Plan:         Active Diagnoses:    Diagnosis Date Noted POA    PRINCIPAL PROBLEM:  CAD (coronary artery disease) [I25.10] 05/30/2017 Yes    3-vessel coronary artery disease [I25.10] 05/31/2017 Yes    HTN (hypertension) [I10] 05/31/2017 Yes    HLD (hyperlipidemia) [E78.5] 05/31/2017 Yes    Pulmonary fibrosis [J84.10] 05/31/2017 Yes    NSTEMI (non-ST elevated myocardial infarction) [I21.4] 05/31/2017 Yes    Uncontrolled type 2 diabetes mellitus with hyperosmolarity, with long-term current use of insulin [E11.00, Z79.4] 05/31/2017 Not Applicable    Diabetic neuropathy [E11.40] 05/31/2017 Yes      Problems Resolved During this Admission:    Diagnosis Date Noted Date Resolved POA     Neuro:   -- alert and oriented  -- no acute issues     Resp:  #Cryptogenic pneumonia:  -- diagnosed one month ago at [facility]  -- on steroid taper from 60mg to 30mg daily, continue taper 10mg every 3 days  -- continue supplemental oxygen, currently with good sat's on minimal O2      Cards:  #Acute decompensated heart failure, HFpEF:  -- acute decompensated failure resolved   -- suspect diastolic CHF although patient had reportedly had EF 35% that later recovered, 2D echo here with normal EF of 60% and DD.  -- d/c lasix as patient appears to be euvolemic \  -- increase coreg 12.5 mg bid, continue ASA 81mg daily  -- cardiac diet with fluid restriction     #NSTEMI:  Elevated troponin:  -- trop 3 here on admission  -- LHC at OSH with LAD, RCA and LCx disease. LHC disc reviewed, patient does not meet criteria for CABG  -- medical management and obtain PET stress test  -- high dose statin with Crestor 40mg qhs  -- BB with coreg, continue ASA  -- d/c heparin gtt     Renal:  #JOSE:  -- unknown Cr baseline, Cr 1.7 today BUN rising with lasix and inadequate UOP, d/c diuresis  -- renal impairment could be contributing to elevated troponin levels  --  control BP   -- strict I's/O's, monitor for improvement with lasix and consider holding for worsening renal impairment     Endocrine:  #DM type II on insulin:  -- poorly controlled at OSH thought to be due to being on steroids for cryptogenic pneumonia  -- on lantus 50u daily and 15u tid novolog with meals at home  -- dose reduction detemir 40u and 12u aspart tidwm  -- MDSSI        Hem/onc:  #Anemia:  -- H/H stable, continue to monitor     #Leukocytosis:  -- likely steroid induced as patient with no s&s of infection, continue to observe WBC down to 13k today from 14  -- continue to follow, but it is down trending since admission to OSH     FENGI:  -- no acute issues        Code: full  Diet: cardiac, renal, diabetic  dvt ppx: SQH and SCD        Case and plan was discussed with Dr. Yusuf       VTE Risk Mitigation         Ordered     heparin (porcine) injection 5,000 Units  Every 8 hours     Route:  Subcutaneous        05/31/17 1532     Medium Risk of VTE  Once      05/31/17 1221     Place sequential compression device  Until discontinued      05/31/17 1221          JOAQUÍN Stiles  Cardiology  [facility]

## 2017-06-01 NOTE — PROGRESS NOTES
Patient arrived to unit via EMS, heparin gtt infusing, vs stable, patient AAOx4, patient denies chest pain. Cardiology team notified.

## 2017-06-01 NOTE — PROGRESS NOTES
Cardiology will assume primary care upon patient's step down to the floor. [facility] Medicine updated.    JOAQUÍN Segura, PGY-2  [phone]

## 2017-06-01 NOTE — PLAN OF CARE
06/01/17 1550   Discharge Assessment   Assessment Type Discharge Planning Assessment   Confirmed/corrected address and phone number on facesheet? Yes   Assessment information obtained from? 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 Yes   How many people do you have in your home that can help with your care after discharge? 2   Who are your caregiver(s) and their phone number(s)? two young sons that she raised and her mother. However her daughter and son that do not live with her are the primary caregivers. Gilberto Hays (son) [phone] Daksha Morgan (daughter) [phone]   Patient's perception of discharge disposition admitted as an inpatient   Readmission Within The Last 30 Days no previous admission in last 30 days   Patient currently being followed by outpatient case management? No   Patient currently receives home health services? No   Does the patient currently use HME? Yes   Name and contact number for HME provider: [facility] [phone] for O2   Patient currently receives private duty nursing? No   Patient currently receives any other outside agency services? No   Equipment Currently Used at Home cane, straight   Do you have any problems affording any of your prescribed medications? No   Is the patient taking medications as prescribed? yes   Do you have any financial concerns preventing you from receiving the healthcare  "you need? No   Does the patient have transportation to healthcare appointments? Yes   Transportation Available family or friend will provide;car   On Dialysis? No   Does the patient receive services at the [facility]? No   Are there any open cases? No   Discharge Plan A Home with family   Discharge Plan B Home with family;Home Health   Patient/Family In Agreement With Plan yes     Met with patient and son in room 3073. The patient lives with her mother and town young men she calls "sons." She stated they were foster children that she raised that still live with her. She stated that her daughter Daksha and her son Gilberto provide her with transportation. She stated she drives on occasion. She stated her mother lives with her but is better health condition that herself. She Lives in [place], [place] near [place]. She stated she did have home health in the past but can not remember the agencies name. She stated she does use [facility] [employer] for her O2 supply. The case manger instructed her caregiver to bring her home O2 tank and concentrator to the hospital prior to discharge if she feels like she needs it for discharge and transport back home. The patient stated PT came to see her today. She expressed wishes to have a rollator prescribed to her prir to discharge to help her get around. She stated she get SOB easily and would like to be more active but have the ability to sit down if she needed to rest to catch her breath. She does not have a preference for HH agency if she needs HH upon discharge. Will discuss with the Primary care team the patients request for a rollator for home use and to place an order through the [facility] prior to discharge.   "

## 2017-06-01 NOTE — PT/OT/SLP EVAL
Physical Therapy  Evaluation    Edie Hays   MRN: [id number]   Admitting Diagnosis: CAD (coronary artery disease)    PT Received On: 06/01/17  PT Start Time: 1330     PT Stop Time: 1400    PT Total Time (min): 30 min       Billable Minutes:  Evaluation 15 and Gait [id number]    Diagnosis: CAD (coronary artery disease)      History reviewed. No pertinent past medical history.   History reviewed. No pertinent surgical history.    Referring physician: Dr. Correa  Date referred to PT: 5/21/17    General Precautions: Standard, fall  Orthopedic Precautions: N/A   Braces: N/A       Do you have any cultural, spiritual, Scientologist conflicts, given your current situation?: Christian    Patient History:  Lives With: child(sonny), adult  Living Arrangements: house  Transportation Available: family or friend will provide  Living Environment Comment: Pt lives with adult children and mother (whom she cares for), in a [facility] with a ramp to enter. Pt was mod (I) with SPC occasionally, and (I) with all other ADLs.   Equipment Currently Used at Home: cane, straight  DME owned (not currently used): none    Previous Level of Function:  Ambulation Skills: needs device (occasional use of SPC)  Transfer Skills: independent  ADL Skills: independent  Work/Leisure Activity: independent    Subjective:  Communicated with RN prior to session.  Pt agreeable to working with PT.   Chief Complaint: fatigue  Patient goals: return home     Pain/Comfort  Pain Rating 1: 0/10  Pain Rating Post-Intervention 1: 0/10      Objective:   Patient found with: pulse ox (continuous), telemetry     Cognitive Exam:  Oriented to: Person, Place, Time and Situation    Follows Commands/attention: Follows multistep  commands  Communication: clear/fluent  Safety awareness/insight to disability: intact    Physical Exam:  Postural examination/scapula alignment: Rounded shoulder and Head forward    Skin integrity: Visible skin intact  Edema: None noted     Sensation:    Intact    Upper Extremity Range of Motion:  Right Upper Extremity: WFL  Left Upper Extremity: WFL    Upper Extremity Strength:  Right Upper Extremity: WFL  Left Upper Extremity: WFL    Lower Extremity Range of Motion:  Right Lower Extremity: WFL  Left Lower Extremity: WFL    Lower Extremity Strength:  Right Lower Extremity: WFL  Left Lower Extremity: WFL     Fine motor coordination:  Intact    Gross motor coordination: WFL    Functional Mobility:  Bed Mobility:   NT pt UIC upon arrival    Transfers:  Sit <> Stand Assistance: Contact Guard Assistance  Sit <> Stand Assistive Device: No Assistive Device    Gait:   Gait Distance: x40 feet+ 40 feet with x3 static standing breaks in between  Assistance 1: Contact Guard Assistance  Gait Assistive Device: Hand held assist  Gait Pattern: 2-point gait  Gait Deviation(s): decreased nayeli, increased time in double stance, decreased velocity of limb motion    Pt required HHA for additional stability with gait. Pt would benefit from education and use of RW for gait 2/2 to instability. Pt required increased static standing breaks 2/2 to decreased endurance.     Balance:   Static Sit: GOOD-: Takes MODERATE challenges from all directions but inconsistently  Dynamic Sit: FAIR+: Maintains balance through MINIMAL excursions of active trunk motion  Static Stand: FAIR: Maintains without assist but unable to take challenges  Dynamic stand: FAIR: Needs CONTACT GUARD during gait    Therapeutic Activities and Exercises:  Patient educated on role of PT and safety with mobility. Patient verbalized and demonstrated understanding of safety precautions with mobility.     AM-PAC 6 CLICK MOBILITY  How much help from another person does this patient currently need?   1 = Unable, Total/Dependent Assistance  2 = A lot, Maximum/Moderate Assistance  3 = A little, Minimum/Contact Guard/Supervision  4 = None, Modified [place]/Independent    Turning over in bed (including adjusting bedclothes,  sheets and blankets)?: 3  Sitting down on and standing up from a chair with arms (e.g., wheelchair, bedside commode, etc.): 3  Moving from lying on back to sitting on the side of the bed?: 3  Moving to and from a bed to a chair (including a wheelchair)?: 3  Need to walk in hospital room?: 3  Climbing 3-5 steps with a railing?: 3  Total Score: 18     AM-PAC Raw Score CMS G-Code Modifier Level of Impairment Assistance   6 % Total / Unable   7 - 9 CM 80 - 100% Maximal Assist   10 - 14 CL 60 - 80% Moderate Assist   15 - 19 CK 40 - 60% Moderate Assist   20 - 22 CJ 20 - 40% Minimal Assist   23 CI 1-20% SBA / CGA   24 CH 0% Independent/ Mod I     Patient left up in chair with all lines intact, call button in reach and RN and family present.    Assessment:   Edie Hays is a 72 y.o. female with a medical diagnosis of CAD (coronary artery disease) and presents with decreased functional mobility due to impaired endurance and weakness throughout.  Patient would benefit from skilled PT in the acute care setting to improve the limitations listed below. Patient would benefit from HHPT upon discharge.      Rehab identified problem list/impairments: Rehab identified problem list/impairments: weakness, impaired endurance, gait instability, impaired functional mobilty, impaired balance, decreased lower extremity function, impaired cardiopulmonary response to activity    Rehab potential is good.    Activity tolerance: Fair    Discharge recommendations: Discharge Facility/Level Of Care Needs: home health PT     Barriers to discharge: Barriers to Discharge: None    Equipment recommendations: Equipment Needed After Discharge: bedside commode, rollator, shower chair     GOALS:    Physical Therapy Goals        Problem: Physical Therapy Goal    Goal Priority Disciplines Outcome Goal Variances Interventions   Physical Therapy Goal     PT/OT, PT Ongoing (interventions implemented as appropriate)     Description:  Goals to be met by:  17     Patient will increase functional independence with mobility by performin. Supine to sit with [place]  2. Sit to stand transfer with Modified [place]  3. Gait  x 300 feet with Modified [place] using Rolling Walker.   4. Lower extremity exercise program x15 reps per handout, with assistance as needed                      PLAN:    Patient to be seen 4 x/week to address the above listed problems via gait training, therapeutic activities, therapeutic exercises  Plan of Care expires: 17  Plan of Care reviewed with: family, patient          Cooper Lay, PT  2017

## 2017-06-01 NOTE — PROGRESS NOTES
Wound care consulted for skin tear to right gluteal  The right gluteal near upper cleft has a skin tear/shearing noted with pink/moist wound bed/open wound edges. The wound was cleaned with normal saline and barrier cream applied to decrease shearing/friction and promote healing. Dr. Correa notified and approved recommendations.  Wound care discussed with patient and family member, repositioning , applying BID and prn- verbalized understanding. Consent was received from the patient for the wound photograph.     06/01/17 1210       Wound 05/31/17 1357 Skin tear midline gluteal   Date First Assessed/Time First Assessed: 05/31/17 1357   Pre-existing: Yes  Wound Type: Skin tear  Side: Right  Orientation: midline  Location: gluteal   Wound WDL ex   Dressing Appearance no dressing   Drainage Amount none   Wound Base pink;moist;clean   Periwound Area normal skin tone   Wound Edges open   Wound Length (cm) 0.5   Wound Width (cm) 0.5   Depth (cm) 0.1   Cleansed W/ sterile normal saline   Interventions barrier applied  (nurse applied barrier cream)   right lower buttock- resolved    Right mid buttock near cleft

## 2017-06-01 NOTE — PLAN OF CARE
Problem: Physical Therapy Goal  Goal: Physical Therapy Goal  Goals to be met by: 17     Patient will increase functional independence with mobility by performin. Supine to sit with [place]  2. Sit to stand transfer with Modified [place]  3. Gait  x 300 feet with Modified [place] using Rolling Walker.   4. Lower extremity exercise program x15 reps per handout, with assistance as needed    Outcome: Ongoing (interventions implemented as appropriate)  Pt evaluated and goals appropriate.     Cooper Lay PT, DPT  [phone]

## 2017-06-02 NOTE — PLAN OF CARE
Problem: Physical Therapy Goal  Goal: Physical Therapy Goal  Goals to be met by: 17     Patient will increase functional independence with mobility by performin. Supine to sit with [place]  2. Sit to stand transfer with Modified [place]  3. Gait  x 300 feet with Modified [place] using Rolling Walker.   4. Lower extremity exercise program x15 reps per handout, with assistance as needed     Outcome: Ongoing (interventions implemented as appropriate)  Pt progressing towards goals.

## 2017-06-02 NOTE — PLAN OF CARE
Called Héctro Garcia RN bedside nurse for the patient. Discussed plan of care and discharge needs based on my assessment. Informed the nurse the patient uses home O2 and the family were informed yesterday to bring her oxygen tank and concentrator to the hospital prior to discharge for transport back home. Also discuss the need for DME prior to discharge. The orders have been placed for shower chair, bedside commode and rollator per PT recs. Return phone number was given to the Héctor to call back if the patient is being discharged later today to ensure her DME is delivered to the room or to her home and that her home O2 is here for home transport. Paged Dr. Quintero to notify of discharge needs for safe discharge to home. Orders have been written for home health need to make sure Radha Pantoja is aware of the need to set up home health with a agency in her area. She lives in [place], [place] near [place].

## 2017-06-02 NOTE — PROGRESS NOTES
[facility]  Cardiology  Progress Note    Patient Name: Edie Hays  MRN: [id number]  Admission Date: 5/31/2017  Hospital Length of Stay: 2 days  Code Status: Full Code   Attending Physician: Lon Yusuf MD   Primary Care Physician: Primary Doctor No  Expected Discharge Date: 6/5/2017  Principal Problem:CAD (coronary artery disease)    Subjective:     Hospital Course: admitted on 5/31/2017 for 3 vessel disease. 2D echo revealing HFpEF with EF of 60%. Ordering PET stress scan to further evaluate coronary perfusion.    Interval History:     NAEON. Elevated BP. Still having some coughing. Has no active complaints.     ROS   Constitutional: no fever or chills  ENT: no nasal congestion or sore throat  Respiratory: + clear productive cough + shortness of breath  Cardiovascular: no chest pain or palpitations  Gastrointestinal: no nausea or vomiting, no abdominal pain or abdominal distention   Genitourinary: no hematuria or dysuria  Integument/Breast: no rash or pruritis  Hematologic/Lymphatic: no easy bruising or lymphadenopathy  Musculoskeletal: no arthralgias or myalgias  Neurological: no seizures or tremors  Endocrine: + cold intolerance, no polyuria  Objective:     Vital Signs (Most Recent):  Temp: 98.4 °F (36.9 °C) (06/02/17 0000)  Pulse: (!) 59 (06/02/17 0400)  Resp: (!) 33 (06/02/17 0400)  BP: (!) 112/56 (06/02/17 0400)  SpO2: 100 % (06/02/17 0400) Vital Signs (24h Range):  Temp:  [98.2 °F (36.8 °C)-98.4 °F (36.9 °C)] 98.4 °F (36.9 °C)  Pulse:  [58-81] 59  Resp:  [11-54] 33  SpO2:  [94 %-100 %] 100 %  BP: (101-168)/(49-93) 112/56     Weight: 86.1 kg (189 lb 13.1 oz)  Body mass index is 31.59 kg/m².    SpO2: 100 %  O2 Device (Oxygen Therapy): nasal cannula      Intake/Output Summary (Last 24 hours) at 06/02/17 0807  Last data filed at 06/01/17 0900   Gross per 24 hour   Intake              240 ml   Output                0 ml   Net              240 ml       Lines/Drains/Airways     Peripheral  Intravenous Line                 Peripheral IV - Single Lumen 06/01/17 1736 Left Antecubital less than 1 day                Physical Exam  General: well developed, well nourished, grunting, NAD  Eyes: conjunctivae/corneas clear. PERRL.  Neck: supple, symmetrical, trachea midline, no JVD  Cardiovascular: Heart: regular rate and rhythm, S1, S2 normal, no murmur, click, rub or gallop.  Lungs: bibasilar clear to auscultation bilaterally and normal respiratory effort  Chest Wall: no tenderness  Abdomen/Rectal: Abdomen: Non distended. + BS. No masses. No TTP. No rebound or guarding. Negative Santos's sign. Rectal: not examined  Extremities: no edema, redness or tenderness in the calves or thighs. Pulses: 2+ and symmetric  Skin: Skin color, texture, turgor normal. No rashes or lesions  Musculoskeletal: full range of motion of joints  Lymph Nodes: No cervical or supraclavicular adenopathy  Psych/Behavioral: Normal. Alert and oriented, appropriate affect.       Significant Labs:   CMP     Recent Labs  Lab 05/31/17  1244 06/01/17  0300 06/02/17  0509    135* 137   K 4.6 4.2 4.1   CL 98 100 103   CO2 28 24 24   * 252* 171*   BUN 40* 45* 59*   CREATININE 1.6* 1.7* 1.8*   CALCIUM 8.5* 8.1* 7.8*   PROT 6.4 6.1 5.4*   ALBUMIN 2.3* 2.2* 2.1*   BILITOT 0.4 0.3 0.2   ALKPHOS 115 114 101   AST 18 20 26   ALT 18 21 32   ANIONGAP 10 11 10   ESTGFRAFRICA 37.4* 34.7* 32.4*   EGFRNONAA 32.4* 30.1* 28.1*   , CBC     Recent Labs  Lab 05/31/17  1244 06/01/17  0300 06/02/17  0509   WBC 14.68* 13.01* 10.54   HGB 10.9* 10.7* 9.9*   HCT 32.2* 31.6* 30.0*    210 182   , INR     Recent Labs  Lab 05/31/17  1244   INR 1.0    and Troponin     Recent Labs  Lab 05/31/17  1244   TROPONINI 3.060*       Significant Imaging: Echocardiogram:   2D echo with color flow doppler:   Results for orders placed or performed during the hospital encounter of 05/31/17   2D echo with color flow doppler   Result Value Ref Range    EF 60 55 - 65     Mitral Valve Regurgitation TRIVIAL     Diastolic Dysfunction Yes (A)     Est. PA Systolic Pressure 36     Tricuspid Valve Regurgitation TRIVIAL            PET stress test: 6/1/2017   CONCLUSIONS: NO CLINICALLY SIGNIFICANT STRESS INDUCED PERFUSION DEFECTS as assessed with PET perfusion imaging.  1. There is no evidence of a discrete hemodynamically significant coronary stenosis.   2. Although no clinically significant stress defect is seen, there is resting flow heterogeneity  and a proximal to distal longitudinal perfusion gradient in addition to reduced coronary flow reserve. These results suggest mild endothelial dysfunction due to mild, diffuse, non-obstructive coronary atherosclerosis without clinically significant, localized perfusion defects. Absolute stress flow and CFR are reduced below the ischemic threshold in the LAD territory which is consistent with the diffuse LAD disease seen on the recent angiogram.  3. Resting wall motion is physiologic. Stress wall motion is physiologic.   4. LV function is normal at rest and stress.  (normal is >= 51%)  5. The ventricular volumes are normal at rest and stress.   6. The extracardiac distribution of radioactivity is normal.   7. There was no previous study available to compare.    Assessment and Plan:         Active Diagnoses:    Diagnosis Date Noted POA    PRINCIPAL PROBLEM:  CAD (coronary artery disease) [I25.10] 05/30/2017 Yes    3-vessel coronary artery disease [I25.10] 05/31/2017 Yes    HTN (hypertension) [I10] 05/31/2017 Yes    HLD (hyperlipidemia) [E78.5] 05/31/2017 Yes    Pulmonary fibrosis [J84.10] 05/31/2017 Yes    NSTEMI (non-ST elevated myocardial infarction) [I21.4] 05/31/2017 Yes    Uncontrolled type 2 diabetes mellitus with hyperosmolarity, with long-term current use of insulin [E11.00, Z79.4] 05/31/2017 Not Applicable    Diabetic neuropathy [E11.40] 05/31/2017 Yes      Problems Resolved During this Admission:    Diagnosis Date Noted Date  Resolved POA     Neuro:   -- alert and oriented  -- no acute issues     Resp:  #Cryptogenic pneumonia:  -- diagnosed one month ago at [facility]  -- on steroid taper from 60mg to 30mg daily, continue taper 10mg every 3 days, 20mg daily starting 6/2/2017  -- continue supplemental oxygen, currently with good sat's on minimal O2. Wean off oxygen as possible.     Cards:  #Acute decompensated heart failure, HFpEF:  -- acute decompensated failure resolved   -- suspect diastolic CHF although patient had reportedly had EF 35% that later recovered, 2D echo here with normal EF of 60% and DD.  -- d/c lasix as patient appears to be euvolemic   -- increase coreg 25 mg bid, continue ASA 81mg daily  -- cardiac diet with fluid restriction     #NSTEMI:  Elevated troponin:  -- trop 3 here on admission  -- LHC at OSH with LAD, RCA and LCx disease. LHC disc reviewed, patient does not meet criteria for CABG  -- medical management   -- PET stress test no clinical regional defects 6/1/2017   -- high dose statin with Crestor 40mg qhs  -- BB with coreg, continue ASA  -- d/c heparin gtt     Renal:  #JOSE:  -- unknown Cr baseline, today Cr 1.8 < -- 1.7 yesterday BUN rising to 59 with lasix, UOP of 250ml  -- renal impairment could be contributing to elevated troponin levels  -- control BP   -- strict I's/O's, monitor for improvement with lasix and consider holding for worsening renal impairment     Endocrine:  #DM type II on insulin:  -- poorly controlled at OSH thought to be due to being on steroids for cryptogenic pneumonia  -- on lantus 50u daily and 15u tid novolog with meals at home  -- dose reduction detemir 40u and 12u aspart tidwm  -- MDSSI     Hem/onc:  #Anemia:  -- H/H stable, continue to monitor CBC daily     #Leukocytosis:  -- likely steroid induced as patient with no s&s of infection, continue to observe WBC down to 10.4 from 13  -- continue to follow, but it is down trending since admission to OSH     FENGI:  -- no acute  issues        Code: full  Diet: cardiac, renal, diabetic  dvt ppx: SQH and SCD        Case and plan to be discussed with Dr. Angeles     VTE Risk Mitigation         Ordered     heparin (porcine) injection 5,000 Units  Every 8 hours     Route:  Subcutaneous        05/31/17 1532     Medium Risk of VTE  Once      05/31/17 1221     Place sequential compression device  Until discontinued      05/31/17 1221          JOAQUÍN Stiles  Cardiology  [facility]

## 2017-06-02 NOTE — PLAN OF CARE
received consult to arrange home health.  Spoke with pts mother Taar Reynolds per telephone to provide family with a list of hh agencies.  She states they have no preference.   will make referral via  [facility] to [facility] health near [place].  Pt will return home with assist of her family to Anshul Tubbs

## 2017-06-02 NOTE — DISCHARGE SUMMARY
[facility]  Cardiology  Discharge Summary      Patient Name: Edie Hays  MRN: [id number]  Admission Date: 5/31/2017  Hospital Length of Stay: 2 days  Discharge Date and Time:  06/02/2017 11:34 AM  Attending Physician: Dr. Davie Angeles  Discharging Provider: JOAQUÍN Stiles  Primary Care Physician: Primary Doctor No    HPI:   This is a 72 year old lady with history of cryptogenic pneumonia on home O2, HTN, HLD, DM type II on insulin, who is transferred from [facility] for further workup and management of 3 vessel disease. She presented to the transferring hospital on 5/27/2017 with progressive SOB over one day. She was found to be in acute hypoxic resp failure, ADHF, and NSTEMI. She was started on IV lasix and BiPAP. She was taken for LHC on 5/29/2017 and found to have 20% LM stenosis, mid LAD 95% stenosis, and 72% LCx ostial stenosis seen on IVUS. She was started on ACS protocol with lovenox for NSTEMI. She was transferred today for evaluation of 3 vessel disease for CABG with possible IABP support. Today she reports having exertional dyspnea, no chest pain, no palpitations, fevers or chills.     * No surgery found *     Indwelling Lines/Drains at time of discharge:  Lines/Drains/Airways          No matching active lines, drains, or airways          Hospital Course (synopsis of major diagnoses, care, treatment, and services provided during the course of the hospital stay):   admitted on 5/31/2017 for 3 vessel disease. The patient's cath images were reviewed her and thought patient had no criteria for CABG. 2D echo was done revealing HFpEF with EF of 60%. PET on 6/1/2017 revealed no clinically significant stress induced perfusion defects on PET perfusion imaging. The patient was discharged to follow with primary cardiologist and pulmonologist for her cryptogenic pneumonia and need for further treatment.         Hospital course of remaining pertinent problem  list:    #Cryptogenic pneumonia:  -- diagnosed one month ago at [facility]  -- on steroid taper from 60mg to 30mg daily, continue taper 10mg every 3 days, 20mg daily starting 6/2/2017  -- continue supplemental oxygen, currently with good sat's on minimal O2. Wean off oxygen as possible.      #NSTEMI:  Elevated troponin:  -- trop 3 here on admission  -- LHC at OSH with LAD, RCA and LCx disease. LHC disc reviewed, patient does not meet criteria for CABG  -- increased Crestor to 20mg qhs  -- BB with coreg, continue ASA  -- d/c heparin gtt     #JOSE:  -- unknown Cr baseline, today Cr 1.8 < -- 1.7 yesterday BUN rising to 59 with lasix, UOP of 250ml  -- discontinue IV lasix and resume 20mg po on discharge  -- renal impairment could be contributing to elevated troponin levels    Significant Diagnostic Studies: Cardiac Graphics: Echocardiogram:   2D echo with color flow doppler:   Results for orders placed or performed during the hospital encounter of 05/31/17   2D echo with color flow doppler   Result Value Ref Range    EF 60 55 - 65    Mitral Valve Regurgitation TRIVIAL     Diastolic Dysfunction Yes (A)     Est. PA Systolic Pressure 36     Tricuspid Valve Regurgitation TRIVIAL     and Stress Test: PET stress test 6/1/2017:    CONCLUSIONS: NO CLINICALLY SIGNIFICANT STRESS INDUCED PERFUSION DEFECTS as assessed with PET perfusion imaging.  1. There is no evidence of a discrete hemodynamically significant coronary stenosis.   2. Although no clinically significant stress defect is seen, there is resting flow heterogeneity  and a proximal to distal longitudinal perfusion gradient in addition to reduced coronary flow reserve. These results suggest mild endothelial dysfunction due to mild, diffuse, non-obstructive coronary atherosclerosis without clinically significant, localized perfusion defects. Absolute stress flow and CFR are reduced below the ischemic threshold in the LAD territory which is consistent with the  diffuse LAD disease seen on the recent angiogram.  3. Resting wall motion is physiologic. Stress wall motion is physiologic.   4. LV function is normal at rest and stress.  (normal is >= 51%)  5. The ventricular volumes are normal at rest and stress.   6. The extracardiac distribution of radioactivity is normal.   7. There was no previous study available to compare.    Pending Diagnostic Studies:     None          Final Active Diagnoses:    Diagnosis Date Noted POA    PRINCIPAL PROBLEM:  CAD (coronary artery disease) [I25.10] 05/30/2017 Yes    3-vessel coronary artery disease [I25.10] 05/31/2017 Yes    HTN (hypertension) [I10] 05/31/2017 Yes    HLD (hyperlipidemia) [E78.5] 05/31/2017 Yes    Pulmonary fibrosis [J84.10] 05/31/2017 Yes    NSTEMI (non-ST elevated myocardial infarction) [I21.4] 05/31/2017 Yes    Uncontrolled type 2 diabetes mellitus with hyperosmolarity, with long-term current use of insulin [E11.00, Z79.4] 05/31/2017 Not Applicable    Diabetic neuropathy [E11.40] 05/31/2017 Yes      Problems Resolved During this Admission:    Diagnosis Date Noted Date Resolved POA       Discharged Condition: good    Follow Up:  Follow-up Information     [facility] In 5 days.    Specialty:  Pulmonary Disease  Contact information:  [address]  [address]  [facility] [address] [phone]             Primary Doctor No In 1 week.               Patient Instructions:     Call MD for:  temperature >100.4     Call MD for:  persistent nausea and vomiting or diarrhea     Call MD for:  severe uncontrolled pain     Call MD for:  difficulty breathing or increased cough     Call MD for:  persistent dizziness, light-headedness, or visual disturbances       Medications:  Reconciled Home Medications:   Current Discharge Medication List      START taking these medications    Details   carvedilol (COREG) 25 MG tablet Take 1 tablet (25 mg total) by mouth 2 (two) times daily.  Qty: 60 tablet, Refills:  1      clopidogrel (PLAVIX) 75 mg tablet Take 1 tablet (75 mg total) by mouth once daily.  Qty: 30 tablet, Refills: 11      nitroGLYCERIN (NITROSTAT) 0.4 MG SL tablet Place 1 tablet (0.4 mg total) under the tongue every 5 (five) minutes as needed for Chest pain.  Qty: 25 tablet, Refills: 3      predniSONE (DELTASONE) 10 MG tablet Take 20 mg daily for 2 days, then 10 mg daily for 2 days, then 5 mg daily for 2 days  Qty: 7 tablet, Refills: 0      !! rosuvastatin (CRESTOR) 20 MG tablet Take 1 tablet (20 mg total) by mouth every evening.  Qty: 90 tablet, Refills: 1       !! - Potential duplicate medications found. Please discuss with provider.      CONTINUE these medications which have NOT CHANGED    Details   amlodipine (NORVASC) 10 MG tablet Take 10 mg by mouth once daily.      aspirin (ECOTRIN) 81 MG EC tablet Take 81 mg by mouth once daily.      buPROPion (WELLBUTRIN SR) 100 MG TBSR 12 hr tablet Take 100 mg by mouth 2 (two) times daily.      duloxetine (CYMBALTA) 60 MG capsule Take 60 mg by mouth once daily.      ferrous sulfate 325 (65 FE) MG EC tablet Take 325 mg by mouth once daily.      furosemide (LASIX) 20 MG tablet Take 20 mg by mouth 2 (two) times daily.      gabapentin (NEURONTIN) 300 MG capsule Take 300 mg by mouth 3 (three) times daily.      insulin aspart (NOVOLOG) 100 unit/mL injection Inject 15 Units into the skin 3 (three) times daily before meals.      insulin glargine (LANTUS) 100 unit/mL injection Inject 50 Units into the skin every evening.      !! rosuvastatin (CRESTOR) 10 MG tablet Take 10 mg by mouth once daily.       !! - Potential duplicate medications found. Please discuss with provider.      STOP taking these medications       metformin (GLUCOPHAGE) 500 MG tablet Comments:   Reason for Stopping:         omeprazole (PRILOSEC) 20 MG capsule Comments:   Reason for Stopping:                   JOAQUÍN Stiles  Cardiology  [facility]

## 2017-06-02 NOTE — SIGNIFICANT EVENT
Prior had an episode of syncope while ambulating prior to discharge. Patient assisted back into bed. I urgently evaluated patient at bedside. Alert and oriented and exam is unchanged from earlier in the day.     Repeat BP is 99/52. Proceeding with 250ml bolus and holding off discharge for now.      JOAQUÍN Segura, PGY-2  [phone]

## 2017-06-02 NOTE — PT/OT/SLP EVAL
"Occupational Therapy  Evaluation    Edie Hays   MRN: [id number]   Admitting Diagnosis: CAD (coronary artery disease)    OT Date of Treatment: 06/02/17   OT Start Time: 0930  OT Stop Time: 1000  OT Total Time (min): 30 min    Billable Minutes:  Evaluation 30    Diagnosis: CAD (coronary artery disease)       Past Medical History:   Diagnosis Date    Cancer     COPD (chronic obstructive pulmonary disease)     Diabetes mellitus     Encounter for blood transfusion     Hypertension       Past Surgical History:   Procedure Laterality Date    BREAST SURGERY      MASTECTOMY         General Precautions: Standard, fall  Orthopedic Precautions: N/A  Patient History:  Living Environment  Lives With: child(sonny), adult  Living Arrangements: house  Home Accessibility: other (see comments) (ramp to enter)  Living Environment Comment: Pt lives with adutl children and mother whom she cares for. Pt was MOD I with SPC and independent with ADL's.   Equipment Currently Used at Home: cane, straight        Subjective:  Communicated with nsg prior to session.  "I've been walking to the bathroom by myself. I hold on to things" pt reports     Pain/Comfort  Pain Rating 1: 0/10    Objective:   Pt found seated in b/s chair and agreeable to activity. Pt on RA with saturation 95%    Cognitive Exam:  Pt awake and oriented. Pt following all commands and demo appropriate mood affect.  Pt tearful at end of session due to poor performance with mobility.       Physical Exam:  Pt is right hand dominant and demo WFL UE strength/ROM. Pt with intact light touch sensation and coordination.     Functional Mobility:    Transfers:  Sit <> Stand Assistance: Contact Guard Assistance  Sit <> Stand Assistive Device: No Assistive Device  Toilet Transfer Assistance: Contact Guard Assistance  Toilet Transfer Assistive Device: No Assistive Device  [facility] and completed t/f with CGA. Pt able to stand and then reported dizziness. Pt stood to rest " "and reports dizziness had improved. Upon ambulation out of bathroom, pt requiring MIN A for safety and 2nd person present to safely assist pt back to b/s chair. LE's elevated and BP taken which was WFL. Oxygen sats had decreased to 88% but returned to 92% after 2-3 min rest break. Dizzy symptoms resolved.       Activities of Daily Living:       LE Dressing Level of Assistance: Minimum assistance  Grooming Position: Seated  Grooming Level of Assistance: Stand by assistance  Toileting Where Assessed: Toilet  Toileting Level of Assistance: Contact guard         -PAC 6 CLICK ADL  How much help from another person does this patient currently need?  1 = Unable, Total/Dependent Assistance  2 = A lot, Maximum/Moderate Assistance  3 = A little, Minimum/Contact Guard/Supervision  4 = None, Modified [place]/Independent    Putting on and taking off regular lower body clothing? : 3  Bathing (including washing, rinsing, drying)?: 2  Toileting, which includes using toilet, bedpan, or urinal? : 3  Putting on and taking off regular upper body clothing?: 3  Taking care of personal grooming such as brushing teeth?: 3  Eating meals?: 3  Total Score: 17    AM-PAC Raw Score CMS "G-Code Modifier Level of Impairment Assistance   6 % Total / Unable   7 - 9 CM 80 - 100% Maximal Assist   10-14 CL 60 - 80% Moderate Assist   15 - 19 CK 40 - 60% Moderate Assist   20 - 22 CJ 20 - 40% Minimal Assist   23 CI 1-20% SBA / CGA   24 CH 0% Independent/ Mod I       Patient left up in chair with all lines intact, call button in reach, nsg notified and PT present    Assessment:  Edie Hays is a 72 y.o. female with a medical diagnosis of CAD (coronary artery disease) and tolerated session fair. Pt limited this date by dizziness which required pt to return seated in b/s chair. OT notified nsg and educated pt to call for assist with mobility to decrease fall risk. Pt to benefit from cont OT to address stated goals. .    Rehab identified problem " list/impairments: Rehab identified problem list/impairments: weakness, impaired functional mobilty, gait instability, impaired endurance, impaired balance, impaired self care skills    Rehab potential is good.    Activity tolerance: Good    Discharge recommendations: Discharge Facility/Level Of Care Needs: home with home health       GOALS:    Occupational Therapy Goals        Problem: Occupational Therapy Goal    Goal Priority Disciplines Outcome Interventions   Occupational Therapy Goal     OT, PT/OT     Description:  Goals to be met by: 7 days 6/9/17     Patient will increase functional independence with ADLs by performing:    UE Dressing with Supervision.  LE Dressing with Supervision.  Grooming while standing with Supervision.  Toileting from toilet with Supervision for hygiene and clothing management.   Stand pivot transfers with Supervision.  Toilet transfer to toilet with Supervision.                      PLAN:  Patient to be seen 5 x/week to address the above listed problems via self-care/home management, therapeutic activities, therapeutic exercises  Plan of Care expires:    Plan of Care reviewed with: patient         DARLINE Lewis  06/02/2017

## 2017-06-02 NOTE — NURSING
Attempted to ambulate patient per order. Pt stood, complained of being dizzy and lightheaded. Did not sway when standing. Took two steps and passed out. Did not fall. Able to get pt back in bed with family assistance. MD notified. Currently at bedside to re-evaluate. /65. HR 61.

## 2017-06-02 NOTE — PLAN OF CARE
06/02/17 1537   Discharge Assessment   Assessment Type Final Discharge Note   Discharge Plan A Home with family;Home Health   Patient/Family In Agreement With Plan yes     Met with patient and son Gilberto Hays in room 3073. The patients discharge was held earlier today d/t syncope and low BP. Discussed with the patient and son the need to have someone from home bring her home O2 to the hospital for transport back to home once she is stable for discharge. They agreed to have someone bring it tomorrow. The patient had her DME delivered to her room. Her rollator, bedside commode and shower chair are in the room and the son stated they have room for all of the DME in the car for transport. The patient is possibly discharging tomorrow. Radha MURPHY has sent a referral to [facility] office for  servcies PT and SN visits. The patients discharge needs have been addressed and completed.  Will follow up on Monday if the patient remains in hospital if the discharge plan changes. Pt discharge pending medical stability.

## 2017-06-02 NOTE — PLAN OF CARE
Problem: Patient Care Overview  Goal: Plan of Care Review  Patient remains stable overnight, VSS, no complaints of pain or discomfort and rested well.

## 2017-06-02 NOTE — PT/OT/SLP PROGRESS
Physical Therapy  Treatment    Edie Hays   MRN: [id number]   Admitting Diagnosis: CAD (coronary artery disease)    PT Received On: 06/02/17  PT Start Time: 0930     PT Stop Time: 0955    PT Total Time (min): 25 min       Billable Minutes:  Gait [id number] and Therapeutic Exercise 10    Treatment Type: Treatment  PT/PTA: PT           Co treat with OT  General Precautions: Standard, fall  Orthopedic Precautions: N/A   Braces:      Do you have any cultural, spiritual, Jewish conflicts, given your current situation?: Orthodoxy    Subjective:  Communicated with RN prior to session.  Pt agreeable to working with PT.     Pain/Comfort  Pain Rating 1: 0/10  Pain Rating Post-Intervention 1: 0/10    Objective:   Patient found with: pulse ox (continuous), telemetry, blood pressure cuff    Functional Mobility:  Bed Mobility:        Transfers:  Sit <> Stand Assistance: Contact Guard Assistance  Sit <> Stand Assistive Device: No Assistive Device    Gait:   Gait Distance: x12+12 feet  Assistance 1: Contact Guard Assistance  Gait Assistive Device: Hand held assist  Gait Pattern: 2-point gait  Gait Deviation(s): decreased nayeli, increased time in double stance, decreased velocity of limb motion    Pt ambulated to commode x 12 feet with seated resting break. Initially upon standing up from commode, pt presented with progressive dizziness. Pt returned to chair x 12 feet with requiring x 2 person assist for safety. Pt oxygen sats dropped to 88%. Pt sats returned within normal range upon returning to chair. RN notified.    Balance:   Static Sit: FAIR+: Able to take MINIMAL challenges from all directions  Dynamic Sit: FAIR+: Maintains balance through MINIMAL excursions of active trunk motion  Static Stand: FAIR: Maintains without assist but unable to take challenges  Dynamic stand: FAIR: Needs CONTACT GUARD during gait     Therapeutic Activities and Exercises:  See gait train above for details. Patient performed seated therex x 15 reps  of AROM of BLE in all available planes of motion. Patient educated on therex to be performed throughout the day to improve strength. Pt demonstrated understanding of therex to be performed.       AM-PAC 6 CLICK MOBILITY  How much help from another person does this patient currently need?   1 = Unable, Total/Dependent Assistance  2 = A lot, Maximum/Moderate Assistance  3 = A little, Minimum/Contact Guard/Supervision  4 = None, Modified [place]/Independent    Turning over in bed (including adjusting bedclothes, sheets and blankets)?: 3  Sitting down on and standing up from a chair with arms (e.g., wheelchair, bedside commode, etc.): 3  Moving from lying on back to sitting on the side of the bed?: 3  Moving to and from a bed to a chair (including a wheelchair)?: 3  Need to walk in hospital room?: 3  Climbing 3-5 steps with a railing?: 2  Total Score: 17    AM-PAC Raw Score CMS G-Code Modifier Level of Impairment Assistance   6 % Total / Unable   7 - 9 CM 80 - 100% Maximal Assist   10 - 14 CL 60 - 80% Moderate Assist   15 - 19 CK 40 - 60% Moderate Assist   20 - 22 CJ 20 - 40% Minimal Assist   23 CI 1-20% SBA / CGA   24 CH 0% Independent/ Mod I     Patient left up in chair with all lines intact, call button in reach and RN notified.    Assessment:  Edie Hays is a 72 y.o. female with a medical diagnosis of CAD (coronary artery disease) and presents with decreased functional mobility due to impaired endurance and weakness throughout.  Patient would benefit from skilled PT in the acute care setting to improve the limitations listed below. Patient would benefit from HHPT upon discharge.      Rehab identified problem list/impairments: Rehab identified problem list/impairments: weakness, impaired endurance, impaired balance, gait instability, impaired cardiopulmonary response to activity, impaired functional mobilty    Rehab potential is good.    Activity tolerance: Fair    Discharge recommendations: Discharge  Facility/Level Of Care Needs: home health PT     Barriers to discharge: Barriers to Discharge: None    Equipment recommendations: Equipment Needed After Discharge: bedside commode, rollator, shower chair     GOALS:    Physical Therapy Goals        Problem: Physical Therapy Goal    Goal Priority Disciplines Outcome Goal Variances Interventions   Physical Therapy Goal     PT/OT, PT Ongoing (interventions implemented as appropriate)     Description:  Goals to be met by: 17     Patient will increase functional independence with mobility by performin. Supine to sit with [place]  2. Sit to stand transfer with Modified [place]  3. Gait  x 300 feet with Modified [place] using Rolling Walker.   4. Lower extremity exercise program x15 reps per handout, with assistance as needed                      PLAN:    Patient to be seen 4 x/week  to address the above listed problems via gait training, therapeutic activities, therapeutic exercises  Plan of Care expires: 17  Plan of Care reviewed with: patient         Cooper Lay, PT  2017

## 2017-06-02 NOTE — PLAN OF CARE
[facility]    HOME HEALTH ORDERS  FACE TO FACE ENCOUNTER    Patient Name: Edie Hays  YOB: 1945    PCP: Primary Doctor No   PCP Address: None  PCP Phone Number: None  PCP Fax: None    Encounter Date: 06/02/2017    Admit to Home Health    Diagnoses:  Active Hospital Problems    Diagnosis  POA    *CAD (coronary artery disease) [I25.10]  Yes    3-vessel coronary artery disease [I25.10]  Yes    HTN (hypertension) [I10]  Yes    HLD (hyperlipidemia) [E78.5]  Yes    Pulmonary fibrosis [J84.10]  Yes    NSTEMI (non-ST elevated myocardial infarction) [I21.4]  Yes    Uncontrolled type 2 diabetes mellitus with hyperosmolarity, with long-term current use of insulin [E11.00, Z79.4]  Not Applicable    Diabetic neuropathy [E11.40]  Yes      Resolved Hospital Problems    Diagnosis Date Resolved POA   No resolved problems to display.       No future appointments.        I have seen and examined this patient face to face today. My clinical findings that support the need for the home health skilled services and home bound status are the following:  Weakness/numbness causing balance and gait disturbance due to Heart Failure, Coronary Heart Disease and Weakness/Debility making it taxing to leave home.    Allergies:  Review of patient's allergies indicates:   Allergen Reactions    Codeine     Lortab [hydrocodone-acetaminophen]     Penicillins        Diet: cardiac diet    Activities: activity as tolerated    Nursing:   SN to complete comprehensive assessment including routine vital signs. Instruct on disease process and s/s of complications to report to MD. Review/verify medication list sent home with the patient at time of discharge  and instruct patient/caregiver as needed. Frequency may be adjusted depending on start of care date.    Notify MD if SBP > 160 or < 90; DBP > 90 or < 50; HR > 120 or < 50; Temp > 101;       CONSULTS:    Physical Therapy to evaluate and treat. Evaluate for home  safety and equipment needs; Establish/upgrade home exercise program. Perform / instruct on therapeutic exercises, gait training, transfer training, and Range of Motion.  Occupational Therapy to evaluate and treat. Evaluate home environment for safety and equipment needs. Perform/Instruct on transfers, ADL training, ROM, and therapeutic exercises.      Medications: Review discharge medications with patient and family and provide education.      Current Discharge Medication List      START taking these medications    Details   carvedilol (COREG) 25 MG tablet Take 1 tablet (25 mg total) by mouth 2 (two) times daily.  Qty: 60 tablet, Refills: 1      clopidogrel (PLAVIX) 75 mg tablet Take 1 tablet (75 mg total) by mouth once daily.  Qty: 30 tablet, Refills: 11      nitroGLYCERIN (NITROSTAT) 0.4 MG SL tablet Place 1 tablet (0.4 mg total) under the tongue every 5 (five) minutes as needed for Chest pain.  Qty: 25 tablet, Refills: 3      predniSONE (DELTASONE) 10 MG tablet Take 20 mg daily for 2 days, then 10 mg daily for 2 days, then 5 mg daily for 2 days  Qty: 7 tablet, Refills: 0      !! rosuvastatin (CRESTOR) 20 MG tablet Take 1 tablet (20 mg total) by mouth every evening.  Qty: 90 tablet, Refills: 1       !! - Potential duplicate medications found. Please discuss with provider.      CONTINUE these medications which have NOT CHANGED    Details   amlodipine (NORVASC) 10 MG tablet Take 10 mg by mouth once daily.      aspirin (ECOTRIN) 81 MG EC tablet Take 81 mg by mouth once daily.      buPROPion (WELLBUTRIN SR) 100 MG TBSR 12 hr tablet Take 100 mg by mouth 2 (two) times daily.      duloxetine (CYMBALTA) 60 MG capsule Take 60 mg by mouth once daily.      ferrous sulfate 325 (65 FE) MG EC tablet Take 325 mg by mouth once daily.      furosemide (LASIX) 20 MG tablet Take 20 mg by mouth 2 (two) times daily.      gabapentin (NEURONTIN) 300 MG capsule Take 300 mg by mouth 3 (three) times daily.      insulin aspart (NOVOLOG) 100  unit/mL injection Inject 15 Units into the skin 3 (three) times daily before meals.      insulin glargine (LANTUS) 100 unit/mL injection Inject 50 Units into the skin every evening.      !! rosuvastatin (CRESTOR) 10 MG tablet Take 10 mg by mouth once daily.       !! - Potential duplicate medications found. Please discuss with provider.      STOP taking these medications       metformin (GLUCOPHAGE) 500 MG tablet Comments:   Reason for Stopping:         omeprazole (PRILOSEC) 20 MG capsule Comments:   Reason for Stopping:               I certify that this patient is confined to her home and needs physical therapy and occupational therapy.

## 2017-06-02 NOTE — PLAN OF CARE
Call received from Lorena @ 1:27pm (from Luz 1:37pm); equipment delivered @ 3:26pm. Shower chair, BSC, Rollator.

## 2017-06-03 NOTE — NURSING
Discharge instructions reviewed and copy given to patient and caregiver.  Pt transferred to wheelchair, all belongings accounted for.  No questions at this time.

## 2017-06-04 NOTE — PT/OT/SLP DISCHARGE
Occupational Therapy Discharge Summary    Edie Hays  MRN: [id number]   CAD (coronary artery disease)   Patient Discharged from acute Occupational Therapy on 6/4/2017 .  Please refer to prior OT note dated on 6/2/17 for functional status.     Assessment:   Patient appropriate for care in another setting.  GOALS:    Occupational Therapy Goals        Problem: Occupational Therapy Goal    Goal Priority Disciplines Outcome Interventions   Occupational Therapy Goal     OT, PT/OT     Description:  Goals to be met by: 7 days 6/9/17     Patient will increase functional independence with ADLs by performing:    UE Dressing with Supervision.  LE Dressing with Supervision.  Grooming while standing with Supervision.  Toileting from toilet with Supervision for hygiene and clothing management.   Stand pivot transfers with Supervision.  Toilet transfer to toilet with Supervision.                  Pt seen for OT eval only; thus, no goals achieved upon hospital d/c.   Reasons for Discontinuation of Therapy Services  Transfer to alternate level of care.      Plan:  Patient Discharged to: Home with Home Health Service   DARLINE Lewis  6/4/2017  .

## 2017-06-05 NOTE — PLAN OF CARE
06/05/17 1623   Final Note   Referral to / orders for Home Health Complete? Yes   30 day supply of medicines given at discharge, if documented non-compliance / non-adherence? n/a   Any social issues identified prior to discharge? No   Did you assess the readiness or willingness of the family or caregiver to support self management of care? Yes   Right Care Referral Info   Post Acute Recommendation Home-care  ([facility] [phone])   Referral Type Home Health for SN and PT eval referral through [facility] care   Facility Name [facility]    [address] [place] [address]

## 2017-10-13 PROBLEM — E11.40 DIABETIC NEUROPATHY: Status: RESOLVED | Noted: 2017-01-01 | Resolved: 2017-01-01

## 2017-10-13 PROBLEM — J84.89 BOOP (BRONCHIOLITIS OBLITERANS WITH ORGANIZING PNEUMONIA): Status: ACTIVE | Noted: 2017-01-01

## 2017-10-13 PROBLEM — Z79.4 UNCONTROLLED TYPE 2 DIABETES MELLITUS WITH HYPEROSMOLARITY, WITH LONG-TERM CURRENT USE OF INSULIN: Status: RESOLVED | Noted: 2017-01-01 | Resolved: 2017-01-01

## 2017-10-13 PROBLEM — Z79.4 TYPE 2 DIABETES MELLITUS WITH STAGE 3 CHRONIC KIDNEY DISEASE, WITH LONG-TERM CURRENT USE OF INSULIN: Status: ACTIVE | Noted: 2017-01-01

## 2017-10-13 PROBLEM — I21.4 NSTEMI (NON-ST ELEVATED MYOCARDIAL INFARCTION): Status: RESOLVED | Noted: 2017-01-01 | Resolved: 2017-01-01

## 2017-10-13 PROBLEM — I25.10 3-VESSEL CORONARY ARTERY DISEASE: Status: RESOLVED | Noted: 2017-01-01 | Resolved: 2017-01-01

## 2017-10-13 PROBLEM — J96.90 RESPIRATORY FAILURE: Status: ACTIVE | Noted: 2017-01-01

## 2017-10-13 PROBLEM — E11.9 TYPE 2 DIABETES MELLITUS: Status: ACTIVE | Noted: 2017-01-01

## 2017-10-13 PROBLEM — E11.22 TYPE 2 DIABETES MELLITUS WITH STAGE 3 CHRONIC KIDNEY DISEASE, WITH LONG-TERM CURRENT USE OF INSULIN: Status: ACTIVE | Noted: 2017-01-01

## 2017-10-13 PROBLEM — J84.10 PULMONARY FIBROSIS: Status: RESOLVED | Noted: 2017-01-01 | Resolved: 2017-01-01

## 2017-10-13 PROBLEM — J84.89 BOOP (BRONCHIOLITIS OBLITERANS WITH ORGANIZING PNEUMONIA): Status: RESOLVED | Noted: 2017-01-01 | Resolved: 2017-01-01

## 2017-10-13 PROBLEM — E11.00 UNCONTROLLED TYPE 2 DIABETES MELLITUS WITH HYPEROSMOLARITY, WITH LONG-TERM CURRENT USE OF INSULIN: Status: RESOLVED | Noted: 2017-01-01 | Resolved: 2017-01-01

## 2017-10-13 PROBLEM — I50.33 ACUTE ON CHRONIC DIASTOLIC HEART FAILURE: Status: ACTIVE | Noted: 2017-01-01

## 2017-10-13 PROBLEM — D64.9 NORMOCYTIC ANEMIA: Status: ACTIVE | Noted: 2017-01-01

## 2017-10-13 PROBLEM — E11.42 TYPE 2 DIABETES MELLITUS WITH DIABETIC POLYNEUROPATHY, WITH LONG-TERM CURRENT USE OF INSULIN: Status: ACTIVE | Noted: 2017-01-01

## 2017-10-13 PROBLEM — J96.01 ACUTE RESPIRATORY FAILURE WITH HYPOXIA: Status: ACTIVE | Noted: 2017-01-01

## 2017-10-13 PROBLEM — N18.30 TYPE 2 DIABETES MELLITUS WITH STAGE 3 CHRONIC KIDNEY DISEASE, WITH LONG-TERM CURRENT USE OF INSULIN: Status: ACTIVE | Noted: 2017-01-01

## 2017-10-13 NOTE — PLAN OF CARE
Outside Transfer Acceptance Note    Transferring Physician or Mid Level Provider/Speciality: Dr. Neil Israel, Internal Medicine     Accepting Physician: Darlin Galindo     Date of Acceptance: 10/13/2017     Transferring Facility/Hospital: [facility] in [place], [place]    Reason for Transfer to [facility]: Patient with continued acute hypoxic respiratory failure requiring intermittent BiPaP and acute diastolic failure with difficulty diuresising patient due to CKD needing Pulmonary evaluation     Report from Transferring Physician or Mid-Level provider/Hospital course: 73 y/o female with past medical history of cryptogenic pneumonia on home oxygen, essential HTN, HLP, Type 2 diabetes with polyneuropathy and CKD stage 3 on home insulin therapy and known 3 vessel CAD with negative PET stress for perfusion defects done here at [facility] so CABG was not indicated was admitted to [facility] with acute hypoxic respiratory failure requiring initial hospitalization in ICU and intubated. Patient now extubated and on floor but still requiring intermittent BiPAP and oxygen for her respiratory failure. Cardiology consulted and following patient at [facility] and feels respiratory failure likely related to acute failure failure. Attempts have been made to diuresis patient with IV lasix but resulted in worsening of renal function so have backed off diuresis and trying to use IV Diuril without much success. At this point due of not improvement in respiratory status request has been made for transfer to higher level of care and for Pulmonary evaluation due top patient's history of cytogenic pneumonia (BOOP) which responded to steroids in past to look for other possibilities for cause of continued respiratory issues besides just cardiac. Patient has been to [facility] [place] on one occasion in 6/2017 for evaluation for CABG for CAD but not deemed to be necessary and treated for acute heart failure and  similar need for BiPAP for respiratory support.     To do list upon patient arrival:   · Evaluation of acute hypoxic respiratory failure and acute heart failure  · Pulmonary consult for continued issues with acute respiratory failure as patient with history of BOOP    Please call extension [phone] upon patient arrival to floor for Hospital Medicine admit team assignment and for additional admit orders. If patient is coming from another [facility] facility please also call [phone] to inform the admit team/office that patient has arrived from the [facility] facility to the floor so patient can be evaluated.      MARY POWERS MD  Attending Staff Physician   [facility]  Pager: [phone]  Spectralink: [phone]

## 2017-10-14 PROBLEM — J96.21 ACUTE ON CHRONIC RESPIRATORY FAILURE WITH HYPOXIA: Status: ACTIVE | Noted: 2017-01-01

## 2017-10-14 PROBLEM — J84.116 CRYPTOGENIC ORGANIZING PNEUMONIA: Status: ACTIVE | Noted: 2017-01-01

## 2017-10-14 PROBLEM — J96.01 ACUTE RESPIRATORY FAILURE WITH HYPOXIA: Status: RESOLVED | Noted: 2017-01-01 | Resolved: 2017-01-01

## 2017-10-14 NOTE — HPI
71 y/o female with past medical history of cryptogenic pneumonia (home oxygen; respondent to steroids), HTN, HLP, Type 2 diabetes (on insulin) with polyneuropathy and CKD stage 3 and CAD was admitted to [facility] with acute hypoxic respiratory failure requiring initial hospitalization in ICU and intubated.  Patient has been transferred to [facility] for evaluation of acute hypoxic respiratory failure and pulmonary consult.     Patients cytogenic PNA was first diagnosed in 5/2017 at [facility]. Patient was placed on a steroid taper from 60mg to 30mg daily, continue taper 10mg every 3 days, 20mg daily.    Patient was originally admitted to [facility] on 10/7/17 with septic shock. Patient required intubation with ICU level care. She was treated with zyvox, levoquin and maxipime.  She was extubated and required intermittent BiPAP and oxygen for her respiratory failure. Cardiology had been following the patient at [facility].  Sputum culture with normal growth. Urine culture negative.  BCx X2 were NGTD. Attempts have been made to diuresis patient with IV lasix but resulted in worsening of renal function so have backed off diuresis and trying to use IV Diuril without much success. Due to this, patient was transferred to [facility] for higher level of care and Pulmonary.    Of note, patient has 3 vessel CAD with negative PET stress for perfusion defects done here at [facility] so CABG was not indicated

## 2017-10-14 NOTE — CONSULTS
"[facility]  Infectious Disease  Consult Note    Patient Name: Edie Hays  MRN: [id number]  Admission Date: 10/13/2017  Hospital Length of Stay: 1 days  Attending Physician: Mike Can, *  Primary Care Provider: Primary Doctor No     Isolation Status: No active isolations    Patient information was obtained from relative(s) and past medical records.      Inpatient consult to Infectious Diseases  Consult performed by: ZARA HODGES.  Consult ordered by: KHAN, BEHRAM  Reason for consult: resp failure        Assessment/Plan:     * Acute respiratory failure with hypoxia    71 yo female with PMH of cryptogenic pneumonia on home oxygen, essential HTN, HLP, Type 2 diabetes, CKD stage 3, and known 3 vessel CAD on whom ID is consulted for possible infectious component to her resp failure. By reports her outside cxs were negative and had no real response to antibiotics. She is afebrile and has a leukocytosis but no left shift or bands    -based on the above information I think it is fairly unlikely that infection has cause her worsening  -viral infection is possible so agree with RSV pcr  -continue CTX for now  -await possible bronch and pending studies  -if she worsens can start cefepime            Thank you for your consult. I will follow-up with patient. Please contact us if you have any additional questions.    Zara Hodges MD  Infectious Disease  [facility]    Subjective:     Principal Problem: Acute respiratory failure with hypoxia    HPI: Patient is on high flow NC and not able to talk due to acuity of illness    Hx is from chart    "71 yo female with PMH of cryptogenic pneumonia on home oxygen, essential HTN, HLP, Type 2 diabetes, CKD stage 3, and known 3 vessel CAD was admitted to [place] with acute hypoxic respiratory failure requiring initial hospitalization in ICU and intubated. Patient now extubated but still requiring intermittent BiPAP " "and oxygen for her respiratory failure. Attempts have been made to diuresis patient with IV lasix but resulted in worsening of renal function so have backed off diuresis and trying to use IV Diuril without much success. At this point due of not improvement in respiratory status request has been made for transfer to higher level of care and for Pulmonary evaluation due top patient's history of cytogenic pneumonia (BOOP) which responded to steroids in past to look for other possibilities for cause of continued respiratory issues besides just cardiac"    Sputum cx was negative at outside hospital and she was treated with linezolid, levaquin, and cefepime.     Here she has been placed on CTX.    Past Medical History:   Diagnosis Date    BOOP (bronchiolitis obliterans with organizing pneumonia) 5/2/2017    Overview:  Diagnosed clinically via response to steroids    Breast cancer     Chronic diastolic heart failure     Coronary artery disease involving native coronary artery of native heart without angina pectoris 5/30/2017    Encounter for blood transfusion     Essential hypertension 5/31/2017    HLD (hyperlipidemia) 5/31/2017    NSTEMI (non-ST elevated myocardial infarction) 5/31/2017    Type 2 diabetes mellitus with diabetic polyneuropathy, with long-term current use of insulin 5/31/2017    Last Assessment & Plan:  History of type 2 diabetes managed on basal bolus insulin at home.  She is currently receiving Lantus 35 units HS and Humalog 5 units with meals.  Blood glucose is stable ranging between 167-200.  We will continue to monitor as she is still receiving steroids making her glucose control difficult.This morning, patient had an accucheck documented at 60 which improved after o    Type 2 diabetes mellitus with stage 3 chronic kidney disease, with long-term current use of insulin 10/13/2017       Past Surgical History:   Procedure Laterality Date    MASTECTOMY         Review of patient's allergies " indicates:   Allergen Reactions    Codeine     Lortab [hydrocodone-acetaminophen] Nausea Only    Penicillins        Medications:  Prescriptions Prior to Admission   Medication Sig    amlodipine (NORVASC) 10 MG tablet Take 10 mg by mouth once daily.    aspirin (ECOTRIN) 81 MG EC tablet Take 81 mg by mouth once daily.    buPROPion (WELLBUTRIN SR) 100 MG TBSR 12 hr tablet Take 100 mg by mouth 2 (two) times daily.    carvedilol (COREG) 12.5 MG tablet Take 1 tablet (12.5 mg total) by mouth 2 (two) times daily.    clopidogrel (PLAVIX) 75 mg tablet Take 1 tablet (75 mg total) by mouth once daily.    duloxetine (CYMBALTA) 60 MG capsule Take 60 mg by mouth once daily.    ferrous sulfate 325 (65 FE) MG EC tablet Take 325 mg by mouth once daily.    furosemide (LASIX) 20 MG tablet Take 20 mg by mouth 2 (two) times daily.    gabapentin (NEURONTIN) 300 MG capsule Take 300 mg by mouth 3 (three) times daily.    insulin aspart (NOVOLOG) 100 unit/mL injection Inject 15 Units into the skin 3 (three) times daily before meals.    insulin glargine (LANTUS) 100 unit/mL injection Inject 50 Units into the skin every evening.    nitroGLYCERIN (NITROSTAT) 0.4 MG SL tablet Place 1 tablet (0.4 mg total) under the tongue every 5 (five) minutes as needed for Chest pain.    predniSONE (DELTASONE) 10 MG tablet Take 20 mg daily for 2 days, then 10 mg daily for 2 days, then 5 mg daily for 2 days    rosuvastatin (CRESTOR) 20 MG tablet Take 1 tablet (20 mg total) by mouth every evening.     Antibiotics     Start     Stop Route Frequency Ordered    10/14/17 0800  cefTRIAXone (ROCEPHIN) 1 g in dextrose 5 % 50 mL IVPB      -- IV Every 24 hours (non-standard times) 10/14/17 0753        Antifungals     None        Antivirals     None             There is no immunization history on file for this patient.    Family History     None        Social History     Social History    Marital status:      Spouse name: N/A    Number of  children: N/A    Years of education: N/A     Social History Main Topics    Smoking status: Never Smoker    Smokeless tobacco: None    Alcohol use None    Drug use: Unknown    Sexual activity: Not Asked     Other Topics Concern    None     Social History Narrative    None     Review of Systems   Unable to perform ROS: Acuity of condition     Objective:     Vital Signs (Most Recent):  Temp: 98.4 °F (36.9 °C) (10/14/17 1132)  Pulse: 101 (10/14/17 1203)  Resp: (!) 32 (10/14/17 1203)  BP: (!) 151/66 (10/14/17 1132)  SpO2: 98 % (10/14/17 1203) Vital Signs (24h Range):  Temp:  [97.4 °F (36.3 °C)-98.5 °F (36.9 °C)] 98.4 °F (36.9 °C)  Pulse:  [] 101  Resp:  [16-32] 32  SpO2:  [92 %-100 %] 98 %  BP: (134-158)/(64-73) 151/66     Weight: 85.6 kg (188 lb 11.4 oz)  Body mass index is 31.4 kg/m².    Estimated Creatinine Clearance: 25.7 mL/min (based on SCr of 2.2 mg/dL (H)).    Physical Exam   Constitutional: She appears lethargic. No distress.   HENT:   Head: Normocephalic and atraumatic.   Eyes: Conjunctivae and EOM are normal.   Neck: Normal range of motion. Neck supple.   Cardiovascular: Normal rate, regular rhythm, normal heart sounds and intact distal pulses.    Pulmonary/Chest: No tachypnea. No respiratory distress. She has rales in the right upper field, the right middle field, the right lower field, the left upper field, the left middle field and the left lower field.   Abdominal: Soft. She exhibits no distension. There is no tenderness.   Musculoskeletal: She exhibits edema (mild BD LE). She exhibits no tenderness.   Neurological: She appears lethargic.   Skin: She is not diaphoretic.       Significant Labs: All pertinent labs within the past 24 hours have been reviewed.    Significant Imaging: I have reviewed all pertinent imaging results/findings within the past 24 hours.

## 2017-10-14 NOTE — ASSESSMENT & PLAN NOTE
-Currently saturating well on BiPAP. ABG from 0300 suspect was a VBG as does not match either ABG paO2 taken before or after. ICU weaned down BiPAP to 55% at bedside with good sats  -Suspect acute lethargy is due to ativan from this AM, recommend cautious use of benzodiazepines or opioids given respiratory status  -Tx as PNA at OSH with negative cultures and hemodynamic stability  -BNP 1943 with mild trop elevation to 0.47, down-trending to 0.428  -Procal >6, lactate wnl  -CxR with diffuse interstitial infiltrates, given exam concern for CHF overload  -Recommend diuresis with lasix 80mg IV x1 now and monitor response, if good would schedule 80mg IV BID  -Recommend starting broad spectrum abx now, if worsens would need to broaden out to include pseudomonal coverage  -Continue BiPAP  -Agree with Pulmonary consult  -At this time she appears stable for the floor but her respiratory status could decompensate over the day. Will followup and re-evaluate later this afternoon. If she has any change in her clinical condition, please do not hesitate to contact MICU team

## 2017-10-14 NOTE — NURSING
BG at midday check 228.  Dr. MILES with administering sliding scale for this coverage.  15 units of scheduled aspart held, as patient is NPO.

## 2017-10-14 NOTE — SUBJECTIVE & OBJECTIVE
Past Medical History:   Diagnosis Date    BOOP (bronchiolitis obliterans with organizing pneumonia) 5/2/2017    Overview:  Diagnosed clinically via response to steroids    Breast cancer     Chronic diastolic heart failure     Coronary artery disease involving native coronary artery of native heart without angina pectoris 5/30/2017    Encounter for blood transfusion     Essential hypertension 5/31/2017    HLD (hyperlipidemia) 5/31/2017    NSTEMI (non-ST elevated myocardial infarction) 5/31/2017    Type 2 diabetes mellitus with diabetic polyneuropathy, with long-term current use of insulin 5/31/2017    Last Assessment & Plan:  History of type 2 diabetes managed on basal bolus insulin at home.  She is currently receiving Lantus 35 units HS and Humalog 5 units with meals.  Blood glucose is stable ranging between 167-200.  We will continue to monitor as she is still receiving steroids making her glucose control difficult.This morning, patient had an accucheck documented at 60 which improved after o    Type 2 diabetes mellitus with stage 3 chronic kidney disease, with long-term current use of insulin 10/13/2017       Past Surgical History:   Procedure Laterality Date    MASTECTOMY         Review of patient's allergies indicates:   Allergen Reactions    Codeine     Lortab [hydrocodone-acetaminophen] Nausea Only    Penicillins        Family History     None        Social History Main Topics    Smoking status: Never Smoker    Smokeless tobacco: Not on file    Alcohol use Not on file    Drug use: Unknown    Sexual activity: Not on file         Review of Systems   Unable to perform ROS: Acuity of condition     Objective:     Vital Signs (Most Recent):  Temp: 98.3 °F (36.8 °C) (10/14/17 1607)  Pulse: 96 (10/14/17 1612)  Resp: (!) 27 (10/14/17 1612)  BP: (!) 140/73 (10/14/17 1630)  SpO2: 99 % (10/14/17 1630) Vital Signs (24h Range):  Temp:  [97.4 °F (36.3 °C)-98.5 °F (36.9 °C)] 98.3 °F (36.8 °C)  Pulse:   [] 96  Resp:  [16-32] 27  SpO2:  [92 %-100 %] 99 %  BP: (134-175)/(64-81) 140/73     Weight: 85.6 kg (188 lb 11.4 oz)  Body mass index is 31.4 kg/m².      Intake/Output Summary (Last 24 hours) at 10/14/17 1849  Last data filed at 10/14/17 1644   Gross per 24 hour   Intake             1000 ml   Output             2950 ml   Net            -1950 ml       Physical Exam   Constitutional: She appears well-developed.   Obese   HENT:   Head: Normocephalic and atraumatic.   Cardiovascular: Normal rate and regular rhythm.    Pulmonary/Chest: She has rales.   Bipap 15/8  Tachypneic   Abdominal: Soft. Bowel sounds are normal.   Skin: Skin is warm and dry.   Nursing note and vitals reviewed.      Vents:  Oxygen Concentration (%): 50 (10/14/17 1612)    Lines/Drains/Airways     Drain                 Urethral Catheter -- days                Significant Labs:    CBC/Anemia Profile:    Recent Labs  Lab 10/13/17  2300 10/14/17  0458 10/14/17  1713   WBC 13.35* 16.04* 12.75*   HGB 7.6* 8.1* 8.6*   HCT 21.9* 24.4* 25.2*   * 157 144*   MCV 74* 75* 75*   RDW 17.8* 17.9* 17.5*        Chemistries:    Recent Labs  Lab 10/13/17  2300 10/14/17  0457 10/14/17  1711 10/14/17  1713   * 148* 147* 147*   K 3.6 3.6 3.7 3.7    109 107 107   CO2 23 23 25 24   BUN 53* 52* 51* 50*   CREATININE 2.3* 2.2* 2.0* 2.0*   CALCIUM 9.6 9.8 10.0 10.1   ALBUMIN 2.9* 3.0* 2.9*  --    PROT 7.0 7.4 7.5  --    BILITOT 1.1* 1.3* 1.4*  --    ALKPHOS 260* 273* 272*  --    ALT 38 35 33  --    AST 49* 42* 32  --    MG 2.2 2.1 1.7  --    PHOS 2.6* 2.4*  --   --        All pertinent labs within the past 24 hours have been reviewed.    Significant Imaging:   I have reviewed all pertinent imaging results/findings within the past 24 hours.

## 2017-10-14 NOTE — NURSING
CC, Pulmonary, IM4 have assessed patient at bedside today.  P.O meds held due to intolerance.  MD ok with administering 15 of aspart despite NPO status.  Patient more alert and displaying nonverbal signs of discomfort.  8o mg lasix ordered.  WCTM.

## 2017-10-14 NOTE — PLAN OF CARE
Problem: Patient Care Overview  Goal: Plan of Care Review  Outcome: Ongoing (interventions implemented as appropriate)  Anticipating transfer to [facility] before shift change.  Patient struggled on various continuous BiPap settings today, but is currently resting comfortably on 15/10.  Moderate distress noted throughout shift as patient displayed nonverbal signs of discomfort and attempted to pull off mask. Family at bedside assisted with keeping patient calm and safe.  Diuretic therapy improved agitation and comfort.  Patient AAOx1 and responds to voice with minor stimulation.  BG elevated and appropriate insulin administered.  Patient remained free of falls.  Standard precautions maintained and patient remained afebrile.  IV abx continued.  Patient running sinus to sinus tach on tele.

## 2017-10-14 NOTE — HPI
"Patient is on high flow NC and not able to talk due to acuity of illness    Hx is from chart    "73 yo female with PMH of cryptogenic pneumonia on home oxygen, essential HTN, HLP, Type 2 diabetes, CKD stage 3, and known 3 vessel CAD was admitted to [facility] with acute hypoxic respiratory failure requiring initial hospitalization in ICU and intubated. Patient now extubated but still requiring intermittent BiPAP and oxygen for her respiratory failure. Attempts have been made to diuresis patient with IV lasix but resulted in worsening of renal function so have backed off diuresis and trying to use IV Diuril without much success. At this point due of not improvement in respiratory status request has been made for transfer to higher level of care and for Pulmonary evaluation due top patient's history of cytogenic pneumonia (BOOP) which responded to steroids in past to look for other possibilities for cause of continued respiratory issues besides just cardiac"    Sputum cx was negative at outside hospital and she was treated with linezolid, levaquin, and cefepime.     Here she has been placed on CTX.  "

## 2017-10-14 NOTE — NURSING
Patient O2 sat adequate on NC high flow at 98%, but accessory muscle usage noted.  WOB appears increased.  RT to bedside.  Team notified.

## 2017-10-14 NOTE — ASSESSMENT & PLAN NOTE
73 yo female with PMH of cryptogenic pneumonia on home oxygen, essential HTN, HLP, Type 2 diabetes, CKD stage 3, and known 3 vessel CAD on whom ID is consulted for possible infectious component to her resp failure. By reports her outside cxs were negative and had no real response to antibiotics. She is afebrile and has a leukocytosis but no left shift or bands    -based on the above information I think it is fairly unlikely that infection has cause her worsening  -viral infection is possible so agree with RSV pcr  -continue CTX for now  -await possible bronch and pending studies  -if she worsens can start cefepime

## 2017-10-14 NOTE — SUBJECTIVE & OBJECTIVE
Past Medical History:   Diagnosis Date    BOOP (bronchiolitis obliterans with organizing pneumonia) 5/2/2017    Overview:  Diagnosed clinically via response to steroids    Breast cancer     Chronic diastolic heart failure     Coronary artery disease involving native coronary artery of native heart without angina pectoris 5/30/2017    Encounter for blood transfusion     Essential hypertension 5/31/2017    HLD (hyperlipidemia) 5/31/2017    NSTEMI (non-ST elevated myocardial infarction) 5/31/2017    Type 2 diabetes mellitus with diabetic polyneuropathy, with long-term current use of insulin 5/31/2017    Last Assessment & Plan:  History of type 2 diabetes managed on basal bolus insulin at home.  She is currently receiving Lantus 35 units HS and Humalog 5 units with meals.  Blood glucose is stable ranging between 167-200.  We will continue to monitor as she is still receiving steroids making her glucose control difficult.This morning, patient had an accucheck documented at 60 which improved after o    Type 2 diabetes mellitus with stage 3 chronic kidney disease, with long-term current use of insulin 10/13/2017       Past Surgical History:   Procedure Laterality Date    MASTECTOMY         Review of patient's allergies indicates:   Allergen Reactions    Codeine     Lortab [hydrocodone-acetaminophen] Nausea Only    Penicillins        No current facility-administered medications on file prior to encounter.      Current Outpatient Prescriptions on File Prior to Encounter   Medication Sig    amlodipine (NORVASC) 10 MG tablet Take 10 mg by mouth once daily.    aspirin (ECOTRIN) 81 MG EC tablet Take 81 mg by mouth once daily.    buPROPion (WELLBUTRIN SR) 100 MG TBSR 12 hr tablet Take 100 mg by mouth 2 (two) times daily.    carvedilol (COREG) 12.5 MG tablet Take 1 tablet (12.5 mg total) by mouth 2 (two) times daily.    clopidogrel (PLAVIX) 75 mg tablet Take 1 tablet (75 mg total) by mouth once daily.     duloxetine (CYMBALTA) 60 MG capsule Take 60 mg by mouth once daily.    ferrous sulfate 325 (65 FE) MG EC tablet Take 325 mg by mouth once daily.    furosemide (LASIX) 20 MG tablet Take 20 mg by mouth 2 (two) times daily.    gabapentin (NEURONTIN) 300 MG capsule Take 300 mg by mouth 3 (three) times daily.    insulin aspart (NOVOLOG) 100 unit/mL injection Inject 15 Units into the skin 3 (three) times daily before meals.    insulin glargine (LANTUS) 100 unit/mL injection Inject 50 Units into the skin every evening.    nitroGLYCERIN (NITROSTAT) 0.4 MG SL tablet Place 1 tablet (0.4 mg total) under the tongue every 5 (five) minutes as needed for Chest pain.    predniSONE (DELTASONE) 10 MG tablet Take 20 mg daily for 2 days, then 10 mg daily for 2 days, then 5 mg daily for 2 days    rosuvastatin (CRESTOR) 20 MG tablet Take 1 tablet (20 mg total) by mouth every evening.     Family History     None        Social History Main Topics    Smoking status: Never Smoker    Smokeless tobacco: Not on file    Alcohol use Not on file    Drug use: Unknown    Sexual activity: Not on file     Review of Systems   Unable to perform ROS: Acuity of condition     Objective:     Vital Signs (Most Recent):  Temp: 97.6 °F (36.4 °C) (10/13/17 2248)  Pulse: 91 (10/13/17 2248)  Resp: (!) 25 (10/13/17 2248)  BP: 134/70 (10/13/17 2248)  SpO2: 99 % (10/13/17 2248) Vital Signs (24h Range):  Temp:  [97.6 °F (36.4 °C)] 97.6 °F (36.4 °C)  Pulse:  [91] 91  Resp:  [16-25] 25  SpO2:  [99 %] 99 %  BP: (134)/(70) 134/70     Weight: 85.6 kg (188 lb 11.4 oz)  Body mass index is 31.4 kg/m².    Physical Exam   Constitutional: She appears distressed.   Pt on BiPAP; responding intermittently   HENT:   Head: Normocephalic and atraumatic.   Eyes: EOM are normal. Pupils are equal, round, and reactive to light.   Neck: Normal range of motion. Neck supple. No JVD present.   Cardiovascular: Regular rhythm and normal heart sounds.    No murmur  heard.  tachycardic   Pulmonary/Chest: She is in respiratory distress. She has wheezes (inspiratory and expiratory).   Abdominal: Soft. Bowel sounds are normal. She exhibits no distension. There is no tenderness. There is no guarding.   Musculoskeletal: She exhibits no edema, tenderness or deformity.   S/p L mastectomy (with lymphectomy)   Neurological:   Patient was unable to talk due to BiPAP and resp distress   Skin: Skin is warm and dry.        Significant Labs:   ABGs:   Recent Labs  Lab 10/13/17  2253   PH 7.398   PCO2 39.9   HCO3 24.6   POCSATURATED 100   BE 0     Blood Culture: No results for input(s): LABBLOO in the last 48 hours.  CBC:   Recent Labs  Lab 10/13/17  2300   WBC 13.35*   HGB 7.6*   HCT 21.9*   *     CMP: No results for input(s): NA, K, CL, CO2, GLU, BUN, CREATININE, CALCIUM, PROT, ALBUMIN, BILITOT, ALKPHOS, AST, ALT, ANIONGAP, EGFRNONAA in the last 48 hours.    Invalid input(s): ESTGFAFRICA  Respiratory Culture: No results for input(s): GSRESP, RESPIRATORYC in the last 48 hours.  Urine Culture: No results for input(s): LABURIN in the last 48 hours.  Urine Studies: No results for input(s): COLORU, APPEARANCEUA, PHUR, SPECGRAV, PROTEINUA, GLUCUA, KETONESU, BILIRUBINUA, OCCULTUA, NITRITE, UROBILINOGEN, LEUKOCYTESUR, RBCUA, WBCUA, BACTERIA, SQUAMEPITHEL, HYALINECASTS in the last 48 hours.    Invalid input(s): ELIZABETH    Significant Imaging: I have reviewed all pertinent imaging results/findings within the past 24 hours.

## 2017-10-14 NOTE — SUBJECTIVE & OBJECTIVE
Past Medical History:   Diagnosis Date    BOOP (bronchiolitis obliterans with organizing pneumonia) 5/2/2017    Overview:  Diagnosed clinically via response to steroids    Breast cancer     Chronic diastolic heart failure     Coronary artery disease involving native coronary artery of native heart without angina pectoris 5/30/2017    Encounter for blood transfusion     Essential hypertension 5/31/2017    HLD (hyperlipidemia) 5/31/2017    NSTEMI (non-ST elevated myocardial infarction) 5/31/2017    Type 2 diabetes mellitus with diabetic polyneuropathy, with long-term current use of insulin 5/31/2017    Last Assessment & Plan:  History of type 2 diabetes managed on basal bolus insulin at home.  She is currently receiving Lantus 35 units HS and Humalog 5 units with meals.  Blood glucose is stable ranging between 167-200.  We will continue to monitor as she is still receiving steroids making her glucose control difficult.This morning, patient had an accucheck documented at 60 which improved after o    Type 2 diabetes mellitus with stage 3 chronic kidney disease, with long-term current use of insulin 10/13/2017       Past Surgical History:   Procedure Laterality Date    MASTECTOMY         Review of patient's allergies indicates:   Allergen Reactions    Codeine     Lortab [hydrocodone-acetaminophen] Nausea Only    Penicillins        Family History     None        Social History Main Topics    Smoking status: Never Smoker    Smokeless tobacco: Not on file    Alcohol use Not on file    Drug use: Unknown    Sexual activity: Not on file      Review of Systems   Unable to perform ROS: Mental status change     Objective:     Vital Signs (Most Recent):  Temp: 97.4 °F (36.3 °C) (10/14/17 0737)  Pulse: 88 (10/14/17 0752)  Resp: (!) 24 (10/14/17 0752)  BP: 138/64 (10/14/17 0737)  SpO2: 98 % (10/14/17 0752) Vital Signs (24h Range):  Temp:  [97.4 °F (36.3 °C)-98.5 °F (36.9 °C)] 97.4 °F (36.3 °C)  Pulse:  [88-97]  88  Resp:  [16-32] 24  SpO2:  [92 %-100 %] 98 %  BP: (134-158)/(64-73) 138/64   Weight: 85.6 kg (188 lb 11.4 oz)  Body mass index is 31.4 kg/m².      Intake/Output Summary (Last 24 hours) at 10/14/17 0932  Last data filed at 10/14/17 0854   Gross per 24 hour   Intake                0 ml   Output             1450 ml   Net            -1450 ml       Physical Exam   Constitutional: She appears lethargic. No distress.   HENT:   Head: Normocephalic and atraumatic.   Eyes: Conjunctivae and EOM are normal.   Neck: Normal range of motion. Neck supple.   Cardiovascular: Normal rate, regular rhythm, normal heart sounds and intact distal pulses.    Pulmonary/Chest: No tachypnea. No respiratory distress. She has rales in the right upper field, the right middle field, the right lower field, the left upper field, the left middle field and the left lower field.   Abdominal: Soft. She exhibits no distension. There is no tenderness.   Musculoskeletal: She exhibits edema (mild DB LE). She exhibits no tenderness.   Neurological: She appears lethargic. GCS eye subscore is 3. GCS verbal subscore is 4. GCS motor subscore is 6.   Skin: She is not diaphoretic.       Vents:  Oxygen Concentration (%): 60 (10/14/17 0752)  Lines/Drains/Airways     Drain                 Urethral Catheter -- days              Significant Labs:    CBC/Anemia Profile:    Recent Labs  Lab 10/13/17  2300 10/14/17  0458   WBC 13.35* 16.04*   HGB 7.6* 8.1*   HCT 21.9* 24.4*   * 157   MCV 74* 75*   RDW 17.8* 17.9*        Chemistries:    Recent Labs  Lab 10/13/17  2300 10/14/17  0457   * 148*   K 3.6 3.6    109   CO2 23 23   BUN 53* 52*   CREATININE 2.3* 2.2*   CALCIUM 9.6 9.8   ALBUMIN 2.9* 3.0*   PROT 7.0 7.4   BILITOT 1.1* 1.3*   ALKPHOS 260* 273*   ALT 38 35   AST 49* 42*   MG 2.2 2.1   PHOS 2.6* 2.4*       Recent Labs  Lab 10/14/17  0813   PH 7.402   PCO2 41.2   PO2 231*   HCO3 25.6   POCSATURATED 100   BE 1     Significant Imaging: I have  reviewed all pertinent imaging results/findings within the past 24 hours.

## 2017-10-14 NOTE — H&P
[facility] Hospital Medicine  History & Physical    Patient Name: Edie Hays  MRN: [id number]  Admission Date: 10/13/2017  Attending Physician: Darlin Galindo MD   Primary Care Provider: [facility] Medicine Team: [facility] HOSP MED 4 Avelina Robles MD     Patient information was obtained from relative(s) and ER records.     Subjective:     Principal Problem:Acute respiratory failure with hypoxia    Chief Complaint: No chief complaint on file.       HPI: 73 y/o female with past medical history of cryptogenic pneumonia (home oxygen; respondent to steroids), HTN, HLP, Type 2 diabetes (on insulin) with polyneuropathy and CKD stage 3 and CAD was admitted to [facility] with acute hypoxic respiratory failure requiring initial hospitalization in ICU and intubated.  Patient has been transferred to [facility] for evaluation of acute hypoxic respiratory failure and pulmonary consult.     Patients cytogenic PNA was first diagnosed in 5/2017 at [facility]. Patient was placed on a steroid taper from 60mg to 30mg daily, continue taper 10mg every 3 days, 20mg daily.    Patient was originally admitted to [facility] on 10/7/17 with septic shock. Patient required intubation with ICU level care. She was treated with zyvox, levoquin and maxipime.  She was extubated and required intermittent BiPAP and oxygen for her respiratory failure. Cardiology had been following the patient at [facility].  Sputum culture with normal growth. Urine culture negative.  BCx X2 were NGTD. Attempts have been made to diuresis patient with IV lasix but resulted in worsening of renal function so have backed off diuresis and trying to use IV Diuril without much success. Due to this, patient was transferred to [facility] for higher level of care and Pulmonary.    Of note, patient has 3 vessel CAD with negative PET stress for perfusion defects done here at [facility] so CABG was not indicated     Past  Medical History:   Diagnosis Date    BOOP (bronchiolitis obliterans with organizing pneumonia) 5/2/2017    Overview:  Diagnosed clinically via response to steroids    Breast cancer     Chronic diastolic heart failure     Coronary artery disease involving native coronary artery of native heart without angina pectoris 5/30/2017    Encounter for blood transfusion     Essential hypertension 5/31/2017    HLD (hyperlipidemia) 5/31/2017    NSTEMI (non-ST elevated myocardial infarction) 5/31/2017    Type 2 diabetes mellitus with diabetic polyneuropathy, with long-term current use of insulin 5/31/2017    Last Assessment & Plan:  History of type 2 diabetes managed on basal bolus insulin at home.  She is currently receiving Lantus 35 units HS and Humalog 5 units with meals.  Blood glucose is stable ranging between 167-200.  We will continue to monitor as she is still receiving steroids making her glucose control difficult.This morning, patient had an accucheck documented at 60 which improved after o    Type 2 diabetes mellitus with stage 3 chronic kidney disease, with long-term current use of insulin 10/13/2017       Past Surgical History:   Procedure Laterality Date    MASTECTOMY         Review of patient's allergies indicates:   Allergen Reactions    Codeine     Lortab [hydrocodone-acetaminophen] Nausea Only    Penicillins        No current facility-administered medications on file prior to encounter.      Current Outpatient Prescriptions on File Prior to Encounter   Medication Sig    amlodipine (NORVASC) 10 MG tablet Take 10 mg by mouth once daily.    aspirin (ECOTRIN) 81 MG EC tablet Take 81 mg by mouth once daily.    buPROPion (WELLBUTRIN SR) 100 MG TBSR 12 hr tablet Take 100 mg by mouth 2 (two) times daily.    carvedilol (COREG) 12.5 MG tablet Take 1 tablet (12.5 mg total) by mouth 2 (two) times daily.    clopidogrel (PLAVIX) 75 mg tablet Take 1 tablet (75 mg total) by mouth once daily.    duloxetine  (CYMBALTA) 60 MG capsule Take 60 mg by mouth once daily.    ferrous sulfate 325 (65 FE) MG EC tablet Take 325 mg by mouth once daily.    furosemide (LASIX) 20 MG tablet Take 20 mg by mouth 2 (two) times daily.    gabapentin (NEURONTIN) 300 MG capsule Take 300 mg by mouth 3 (three) times daily.    insulin aspart (NOVOLOG) 100 unit/mL injection Inject 15 Units into the skin 3 (three) times daily before meals.    insulin glargine (LANTUS) 100 unit/mL injection Inject 50 Units into the skin every evening.    nitroGLYCERIN (NITROSTAT) 0.4 MG SL tablet Place 1 tablet (0.4 mg total) under the tongue every 5 (five) minutes as needed for Chest pain.    predniSONE (DELTASONE) 10 MG tablet Take 20 mg daily for 2 days, then 10 mg daily for 2 days, then 5 mg daily for 2 days    rosuvastatin (CRESTOR) 20 MG tablet Take 1 tablet (20 mg total) by mouth every evening.     Family History     None        Social History Main Topics    Smoking status: Never Smoker    Smokeless tobacco: Not on file    Alcohol use Not on file    Drug use: Unknown    Sexual activity: Not on file     Review of Systems   Unable to perform ROS: Acuity of condition     Objective:     Vital Signs (Most Recent):  Temp: 97.6 °F (36.4 °C) (10/13/17 2248)  Pulse: 91 (10/13/17 2248)  Resp: (!) 25 (10/13/17 2248)  BP: 134/70 (10/13/17 2248)  SpO2: 99 % (10/13/17 2248) Vital Signs (24h Range):  Temp:  [97.6 °F (36.4 °C)] 97.6 °F (36.4 °C)  Pulse:  [91] 91  Resp:  [16-25] 25  SpO2:  [99 %] 99 %  BP: (134)/(70) 134/70     Weight: 85.6 kg (188 lb 11.4 oz)  Body mass index is 31.4 kg/m².    Physical Exam   Constitutional: She appears distressed.   Pt on BiPAP; responding intermittently   HENT:   Head: Normocephalic and atraumatic.   Eyes: EOM are normal. Pupils are equal, round, and reactive to light.   Neck: Normal range of motion. Neck supple. No JVD present.   Cardiovascular: Regular rhythm and normal heart sounds.    No murmur heard.  tachycardic    Pulmonary/Chest: She is in respiratory distress. She has wheezes (inspiratory and expiratory).   Abdominal: Soft. Bowel sounds are normal. She exhibits no distension. There is no tenderness. There is no guarding.   Musculoskeletal: She exhibits no edema, tenderness or deformity.   S/p L mastectomy (with lymphectomy)   Neurological:   Patient was unable to talk due to BiPAP and resp distress   Skin: Skin is warm and dry.        Significant Labs:   ABGs:   Recent Labs  Lab 10/13/17  2253   PH 7.398   PCO2 39.9   HCO3 24.6   POCSATURATED 100   BE 0     Blood Culture: No results for input(s): LABBLOO in the last 48 hours.  CBC:   Recent Labs  Lab 10/13/17  2300   WBC 13.35*   HGB 7.6*   HCT 21.9*   *     CMP: No results for input(s): NA, K, CL, CO2, GLU, BUN, CREATININE, CALCIUM, PROT, ALBUMIN, BILITOT, ALKPHOS, AST, ALT, ANIONGAP, EGFRNONAA in the last 48 hours.    Invalid input(s): ESTGFAFRICA  Respiratory Culture: No results for input(s): GSRESP, RESPIRATORYC in the last 48 hours.  Urine Culture: No results for input(s): LABURIN in the last 48 hours.  Urine Studies: No results for input(s): COLORU, APPEARANCEUA, PHUR, SPECGRAV, PROTEINUA, GLUCUA, KETONESU, BILIRUBINUA, OCCULTUA, NITRITE, UROBILINOGEN, LEUKOCYTESUR, RBCUA, WBCUA, BACTERIA, SQUAMEPITHEL, HYALINECASTS in the last 48 hours.    Invalid input(s): WRIGHTSUR    Significant Imaging: I have reviewed all pertinent imaging results/findings within the past 24 hours.    Assessment/Plan:     * Acute respiratory failure with hypoxia    -patient has a hx of organizing PNA (working diagnosis); other differentials include pulmonary fibrosis; Pulmonary lymphoma (as radiographic appearance of pulmonary lymphoma can be similar to ), CAP, Idiopathic interstitial pneumonia (other than ; such as IPF, DIP, RBILD etc)  -other non pulmonary differential diagnosis include: heart failure, COPD, CNS origination  -non responsive to antibiotics supports the dx of  organizing PNA (and even though patient was treated with steroids in the past, they taper may have been too quick, causing recurrence of the disease)   -qSOFA 2/4 (has a 6% risk of bad outcome); increased RR, HR and WBC (leukocytosis at OSH)  -will get CXR  -ABG showed pH of 7.398; CO2 39.9; PO2 317  -Will get CBC, CMP, ESR and CRP (Leukocytosis is present in about 50 percent of patients.  Elevated ESR and CRP are each observed in 70 to 80 percent)  -will get PCR RSV, Urine legionella and HIV1/2  -blood cultures x2  -lactate level  -EKG and Echo to evaluate any cardiogenic causes of resp failure  -patient currently on BiPAP  -will place patient on telemetry with continuous pulse ox and oxygen weaning parameters   -may need a bronchoalveolar lavage (BAL) samples to evaluate for infection, hemorrhage, and malignancy  -will consult pulmonology for continued issues with acute respiratory failure as patient with history of BOOP  -Additionally, as organizing pneumonia may also occur in association with various connective tissue diseases, testing for these diseases (eg, antinuclear antibody, rheumatoid factor, creatine kinase, anti-topoisomerase anti-centromere antibody, anti-double-stranded DNA, anti-JO1) may be considered in the future          Type 2 diabetes mellitus with stage 3 chronic kidney disease, with long-term current use of insulin    -patients baseline Cr appears to be around 1.7  -will closely monitor with daily CMPs          Acute on chronic diastolic heart failure    -patient had a repeat echo on 10/7/17 which showed LVEF 60-65%, PASP 70-75, moderate TR  -Venous US R LE was negative for DVT  -patient was treated at OSH with Bumex 2mg IV x1 day and albumin 1 hour prior  -cardiology was consulted; they reviewed CT and CXR images and due to these findings and bump in Cr after diuresis, they felt that the resp failure was more like PNA in etiology.    -patient was subsequently transferred here for higher level  of care/pulmonary lavage          Normocytic anemia    -continue ferrous sulfate 325  -patient required 2U transfusion at OSH  -will get CBC q daily and trend           Type 2 diabetes mellitus with diabetic polyneuropathy, with long-term current use of insulin    -patients home regiment is 50U lantus daily, with 15U TIDWM  -will cut patients long acting insulin to 30U since she is on bipap and will likely have decreased PO intake   -patients short acting insulin will be ordered as home dose (15U) to get when she does eat  -will get updated A1c          HLD (hyperlipidemia)    -continue with crestor 20mg          Essential hypertension    -continue amlodipine 10mg and carvedilol 12.5mg BID          Coronary artery disease involving native coronary artery of native heart without angina pectoris    -continue on Aspirin 81 and Plavix 75            VTE Risk Mitigation         Ordered     enoxaparin injection 40 mg  Daily     Route:  Subcutaneous        10/13/17 2326     Place sequential compression device  Until discontinued      10/13/17 2324     Medium Risk of VTE  Once      10/13/17 2316             Avelina Robles MD  Department of Hospital Medicine   [facility]

## 2017-10-14 NOTE — PLAN OF CARE
"Problem: Patient Care Overview  Goal: Plan of Care Review  Outcome: Ongoing (interventions implemented as appropriate)  Pt arrived via stretcher from [place], [place], Alert to self, disoriented to time, place , and situation. Telemetry monitor applied along with bedside continuous pulse ox. Pt responds "no" to all questions asked. Labs are drawn and EKG is performed at bedside upon pt arrival. RT called to bedside to assist with BiPAP and assess needs. Pt became agitated, pulling mask off frequently. pt states," my stomach hurts get this off of me." respirations increased to 34. MD notified of pt behavior and oxygen demand. Oxycodone 5 mg  ordered for pain, no relief noted. Pt also complained of chest pain, prn nitroglycerin administered. Pt spit out nitro and chest pain resolved. MD ordered one time dose of lorazepam 2mg IV. Pt was no longer pulling off mask and became came calm. Respirations back to baseline of 24. Small skin tear noted in between buttocks, mepilex is applied for added comfort. SPO2- 100% pulse-90 current bipap settings of 15/5. Will monitor.       "

## 2017-10-14 NOTE — ASSESSMENT & PLAN NOTE
-patients home regiment is 50U lantus daily, with 15U TIDWM  -will cut patients long acting insulin to 30U since she is on bipap and will likely have decreased PO intake   -patients short acting insulin will be ordered as home dose (15U) to get when she does eat  -will get updated A1c

## 2017-10-14 NOTE — HPI
Ms. Hays is a 71yo female with a PMHx that includes cryptogenic organizing pneumonia on home oxygen, CHF, DM, CKD 3, CAD transferred from [facility] with acute hypoxic respiratory failure. She was intubated for presumed pneumonia and sepsis. She was eventually extubated, stepped down, but did not tolerate diuresis (increasing creatinine). She required continuous bipap and mental status declined. Transferred to [facility], outside records unavailable.

## 2017-10-14 NOTE — NURSING
Report given to JONATHAN Elizabeth in [facility].  Preparing patient for transfer to [facility] once bed is clean.

## 2017-10-14 NOTE — ASSESSMENT & PLAN NOTE
-patient has a hx of organizing PNA (working diagnosis); other differentials include pulmonary fibrosis; Pulmonary lymphoma (as radiographic appearance of pulmonary lymphoma can be similar to ), CAP, Idiopathic interstitial pneumonia (other than ; such as IPF, DIP, RBILD etc),   -other non pulmonary differential diagnosis include: heart failure, COPD, CNS origination  -qSOFA 2/4 (has a 6% risk of bad outcome); increased RR, HR and WBC (leukocytosis at OSH)  -will get CXR  -ABG showed pH of 7.398; CO2 39.9; PO2 317  -Will get CBC, CMP, ESR and CRP (Leukocytosis is present in about 50 percent of patients.  Elevated ESR and CRP are each observed in 70 to 80 percent)  -will get PCR RSV, Urine legionella and HIV1/2  -blood cultures x2  -lactate level  -EKG and Echo to evaluate any cardiogenic causes of resp failure  -patient currently on BiPAP  -will place patient on telemetry with continuous pulse ox and oxygen weaning parameters   -may need a bronchoalveolar lavage (BAL) samples to evaluate for infection, hemorrhage, and malignancy  -will consult pulmonology for continued issues with acute respiratory failure as patient with history of BOOP  -Additionally, as organizing pneumonia may also occur in association with various connective tissue diseases, testing for these diseases (eg, antinuclear antibody, rheumatoid factor, creatine kinase, anti-topoisomerase anti-centromere antibody, anti-double-stranded DNA, anti-JO1) may be considered in the future

## 2017-10-14 NOTE — HPI
73 y/o PMH cryptogenic PNA on home oxygen therapy which per report has responded to steroids in the past, HTN, IDDM2, CKD3, CAD was admitted as a transfer for Pulm eval from [facility]. Initially admitted with septic shock 10/07 and intubated and transferred to ICU. Treated with zyvox, levoquin and cefepime. At that admission her sputum cx, UCx and BCx were negative. Per report attempts at diuresis prior to transfer including diuril and lasix were unsuccessful.     Initial cryptogenic PNA was diagnosed 05/2017 where she was discharged on a steroid taper starting at 60mg and currently on 20mg qd.     Overnight she had worsening lethargy per her . Placed on BiPAP overnight. She currently is arousable but confused and denies any pain, shortness of breath. Her  reports lethargy worsened after getting ativan at 3AM.

## 2017-10-14 NOTE — CONSULTS
[facility]  Critical Care Medicine  Consult Note    Patient Name: Edie Hays  MRN: [id number]  Admission Date: 10/13/2017  Hospital Length of Stay: 1 days  Code Status: Full Code  Attending Physician: Mike Can, *   Primary Care Provider: Primary Doctor No   Principal Problem: Acute respiratory failure with hypoxia    Inpatient consult to Critical Care Medicine  Consult performed by: CONNOR RAUSCH IV  Consult ordered by: CARLEY JORDAN  Reason for consult: respiratory failure        Subjective:     HPI:  71 y/o PMH cryptogenic PNA on home oxygen therapy which per report has responded to steroids in the past, HTN, IDDM2, CKD3, CAD was admitted as a transfer for Pul eval from [facility]. Initially admitted with septic shock 10/07 and intubated and transferred to ICU. Treated with zyvox, levoquin and cefepime. At that admission her sputum cx, UCx and BCx were negative. Per report attempts at diuresis prior to transfer including diuril and lasix were unsuccessful.     Initial cryptogenic PNA was diagnosed 05/2017 where she was discharged on a steroid taper starting at 60mg and currently on 20mg qd.     Overnight she had worsening lethargy per her . Placed on BiPAP overnight. She currently is arousable but confused and denies any pain, shortness of breath. Her  reports lethargy worsened after getting ativan at 3AM.     Hospital/ICU Course:  No notes on file    Past Medical History:   Diagnosis Date    BOOP (bronchiolitis obliterans with organizing pneumonia) 5/2/2017    Overview:  Diagnosed clinically via response to steroids    Breast cancer     Chronic diastolic heart failure     Coronary artery disease involving native coronary artery of native heart without angina pectoris 5/30/2017    Encounter for blood transfusion     Essential hypertension 5/31/2017    HLD (hyperlipidemia) 5/31/2017    NSTEMI (non-ST elevated myocardial  infarction) 5/31/2017    Type 2 diabetes mellitus with diabetic polyneuropathy, with long-term current use of insulin 5/31/2017    Last Assessment & Plan:  History of type 2 diabetes managed on basal bolus insulin at home.  She is currently receiving Lantus 35 units HS and Humalog 5 units with meals.  Blood glucose is stable ranging between 167-200.  We will continue to monitor as she is still receiving steroids making her glucose control difficult.This morning, patient had an accucheck documented at 60 which improved after o    Type 2 diabetes mellitus with stage 3 chronic kidney disease, with long-term current use of insulin 10/13/2017       Past Surgical History:   Procedure Laterality Date    MASTECTOMY         Review of patient's allergies indicates:   Allergen Reactions    Codeine     Lortab [hydrocodone-acetaminophen] Nausea Only    Penicillins        Family History     None        Social History Main Topics    Smoking status: Never Smoker    Smokeless tobacco: Not on file    Alcohol use Not on file    Drug use: Unknown    Sexual activity: Not on file      Review of Systems   Unable to perform ROS: Mental status change     Objective:     Vital Signs (Most Recent):  Temp: 97.4 °F (36.3 °C) (10/14/17 0737)  Pulse: 88 (10/14/17 0752)  Resp: (!) 24 (10/14/17 0752)  BP: 138/64 (10/14/17 0737)  SpO2: 98 % (10/14/17 0752) Vital Signs (24h Range):  Temp:  [97.4 °F (36.3 °C)-98.5 °F (36.9 °C)] 97.4 °F (36.3 °C)  Pulse:  [88-97] 88  Resp:  [16-32] 24  SpO2:  [92 %-100 %] 98 %  BP: (134-158)/(64-73) 138/64   Weight: 85.6 kg (188 lb 11.4 oz)  Body mass index is 31.4 kg/m².      Intake/Output Summary (Last 24 hours) at 10/14/17 0932  Last data filed at 10/14/17 0854   Gross per 24 hour   Intake                0 ml   Output             1450 ml   Net            -1450 ml       Physical Exam   Constitutional: She appears lethargic. No distress.   HENT:   Head: Normocephalic and atraumatic.   Eyes: Conjunctivae and  EOM are normal.   Neck: Normal range of motion. Neck supple.   Cardiovascular: Normal rate, regular rhythm, normal heart sounds and intact distal pulses.    Pulmonary/Chest: No tachypnea. No respiratory distress. She has rales in the right upper field, the right middle field, the right lower field, the left upper field, the left middle field and the left lower field.   Abdominal: Soft. She exhibits no distension. There is no tenderness.   Musculoskeletal: She exhibits edema (mild DB LE). She exhibits no tenderness.   Neurological: She appears lethargic. GCS eye subscore is 3. GCS verbal subscore is 4. GCS motor subscore is 6.   Skin: She is not diaphoretic.       Vents:  Oxygen Concentration (%): 60 (10/14/17 0752)  Lines/Drains/Airways     Drain                 Urethral Catheter -- days              Significant Labs:    CBC/Anemia Profile:    Recent Labs  Lab 10/13/17  2300 10/14/17  0458   WBC 13.35* 16.04*   HGB 7.6* 8.1*   HCT 21.9* 24.4*   * 157   MCV 74* 75*   RDW 17.8* 17.9*        Chemistries:    Recent Labs  Lab 10/13/17  2300 10/14/17  0457   * 148*   K 3.6 3.6    109   CO2 23 23   BUN 53* 52*   CREATININE 2.3* 2.2*   CALCIUM 9.6 9.8   ALBUMIN 2.9* 3.0*   PROT 7.0 7.4   BILITOT 1.1* 1.3*   ALKPHOS 260* 273*   ALT 38 35   AST 49* 42*   MG 2.2 2.1   PHOS 2.6* 2.4*       Recent Labs  Lab 10/14/17  0813   PH 7.402   PCO2 41.2   PO2 231*   HCO3 25.6   POCSATURATED 100   BE 1     Significant Imaging: I have reviewed all pertinent imaging results/findings within the past 24 hours.    Assessment/Plan:     Pulmonary   * Acute respiratory failure with hypoxia    -Currently saturating well on BiPAP. ABG from 0300 suspect was a VBG as does not match either ABG paO2 taken before or after. ICU weaned down BiPAP to 55% at bedside with good sats  -Suspect acute lethargy is due to ativan from this AM, recommend cautious use of benzodiazepines or opioids given respiratory status  -Tx as PNA at OSH with  negative cultures and hemodynamic stability  -BNP 1943 with mild trop elevation to 0.47, down-trending to 0.428  -Procal >6, lactate wnl  -CxR with diffuse interstitial infiltrates, given exam concern for CHF overload  -Recommend diuresis with lasix 80mg IV x1 now and monitor response, if good would schedule 80mg IV BID  -Recommend starting broad spectrum abx now, if worsens would need to broaden out to include pseudomonal coverage  -Continue BiPAP  -Agree with Pulmonary consult  -At this time she appears stable for the floor but her respiratory status could decompensate over the day. Will followup and re-evaluate later this afternoon. If she has any change in her clinical condition, please do not hesitate to contact MICU team          Cardiac/Vascular   Acute on chronic diastolic heart failure    -See respiratory failure           Discussed with Dr. Meehan, ICU Fellow     Tarun Cabrera IV, MD  Critical Care Medicine  [facility]

## 2017-10-14 NOTE — NURSING
Pt arrived to floor on stretcher alert on portable BiPap 16/8 with an SPO2- 90%. MD was notified of pt arrival. Orders are being placed. Will follow plan of care.

## 2017-10-14 NOTE — ASSESSMENT & PLAN NOTE
-continue ferrous sulfate 325  -patient required 2U transfusion at OSH  -will get CBC q daily and trend

## 2017-10-14 NOTE — ASSESSMENT & PLAN NOTE
-patient had a repeat echo on 10/7/17 which showed LVEF 60-65%, PASP 70-75, moderate TR  -Venous US R LE was negative for DVT  -patient was treated at OSH with Bumex 2mg IV x1 day and albumin 1 hour prior  -cardiology was consulted; they reviewed CT and CXR images and due to these findings and bump in Cr after diuresis, they felt that the resp failure was more like PNA in etiology.    -patient was subsequently transferred here for higher level of care/pulmonary lavage

## 2017-10-14 NOTE — SUBJECTIVE & OBJECTIVE
Past Medical History:   Diagnosis Date    BOOP (bronchiolitis obliterans with organizing pneumonia) 5/2/2017    Overview:  Diagnosed clinically via response to steroids    Breast cancer     Chronic diastolic heart failure     Coronary artery disease involving native coronary artery of native heart without angina pectoris 5/30/2017    Encounter for blood transfusion     Essential hypertension 5/31/2017    HLD (hyperlipidemia) 5/31/2017    NSTEMI (non-ST elevated myocardial infarction) 5/31/2017    Type 2 diabetes mellitus with diabetic polyneuropathy, with long-term current use of insulin 5/31/2017    Last Assessment & Plan:  History of type 2 diabetes managed on basal bolus insulin at home.  She is currently receiving Lantus 35 units HS and Humalog 5 units with meals.  Blood glucose is stable ranging between 167-200.  We will continue to monitor as she is still receiving steroids making her glucose control difficult.This morning, patient had an accucheck documented at 60 which improved after o    Type 2 diabetes mellitus with stage 3 chronic kidney disease, with long-term current use of insulin 10/13/2017       Past Surgical History:   Procedure Laterality Date    MASTECTOMY         Review of patient's allergies indicates:   Allergen Reactions    Codeine     Lortab [hydrocodone-acetaminophen] Nausea Only    Penicillins        Medications:  Prescriptions Prior to Admission   Medication Sig    amlodipine (NORVASC) 10 MG tablet Take 10 mg by mouth once daily.    aspirin (ECOTRIN) 81 MG EC tablet Take 81 mg by mouth once daily.    buPROPion (WELLBUTRIN SR) 100 MG TBSR 12 hr tablet Take 100 mg by mouth 2 (two) times daily.    carvedilol (COREG) 12.5 MG tablet Take 1 tablet (12.5 mg total) by mouth 2 (two) times daily.    clopidogrel (PLAVIX) 75 mg tablet Take 1 tablet (75 mg total) by mouth once daily.    duloxetine (CYMBALTA) 60 MG capsule Take 60 mg by mouth once daily.    ferrous sulfate 325 (65  FE) MG EC tablet Take 325 mg by mouth once daily.    furosemide (LASIX) 20 MG tablet Take 20 mg by mouth 2 (two) times daily.    gabapentin (NEURONTIN) 300 MG capsule Take 300 mg by mouth 3 (three) times daily.    insulin aspart (NOVOLOG) 100 unit/mL injection Inject 15 Units into the skin 3 (three) times daily before meals.    insulin glargine (LANTUS) 100 unit/mL injection Inject 50 Units into the skin every evening.    nitroGLYCERIN (NITROSTAT) 0.4 MG SL tablet Place 1 tablet (0.4 mg total) under the tongue every 5 (five) minutes as needed for Chest pain.    predniSONE (DELTASONE) 10 MG tablet Take 20 mg daily for 2 days, then 10 mg daily for 2 days, then 5 mg daily for 2 days    rosuvastatin (CRESTOR) 20 MG tablet Take 1 tablet (20 mg total) by mouth every evening.     Antibiotics     Start     Stop Route Frequency Ordered    10/14/17 0800  cefTRIAXone (ROCEPHIN) 1 g in dextrose 5 % 50 mL IVPB      -- IV Every 24 hours (non-standard times) 10/14/17 0753        Antifungals     None        Antivirals     None             There is no immunization history on file for this patient.    Family History     None        Social History     Social History    Marital status:      Spouse name: N/A    Number of children: N/A    Years of education: N/A     Social History Main Topics    Smoking status: Never Smoker    Smokeless tobacco: None    Alcohol use None    Drug use: Unknown    Sexual activity: Not Asked     Other Topics Concern    None     Social History Narrative    None     Review of Systems   Unable to perform ROS: Acuity of condition     Objective:     Vital Signs (Most Recent):  Temp: 98.4 °F (36.9 °C) (10/14/17 1132)  Pulse: 101 (10/14/17 1203)  Resp: (!) 32 (10/14/17 1203)  BP: (!) 151/66 (10/14/17 1132)  SpO2: 98 % (10/14/17 1203) Vital Signs (24h Range):  Temp:  [97.4 °F (36.3 °C)-98.5 °F (36.9 °C)] 98.4 °F (36.9 °C)  Pulse:  [] 101  Resp:  [16-32] 32  SpO2:  [92 %-100 %] 98 %  BP:  (134-158)/(64-73) 151/66     Weight: 85.6 kg (188 lb 11.4 oz)  Body mass index is 31.4 kg/m².    Estimated Creatinine Clearance: 25.7 mL/min (based on SCr of 2.2 mg/dL (H)).    Physical Exam   Constitutional: She appears lethargic. No distress.   HENT:   Head: Normocephalic and atraumatic.   Eyes: Conjunctivae and EOM are normal.   Neck: Normal range of motion. Neck supple.   Cardiovascular: Normal rate, regular rhythm, normal heart sounds and intact distal pulses.    Pulmonary/Chest: No tachypnea. No respiratory distress. She has rales in the right upper field, the right middle field, the right lower field, the left upper field, the left middle field and the left lower field.   Abdominal: Soft. She exhibits no distension. There is no tenderness.   Musculoskeletal: She exhibits edema (mild DB LE). She exhibits no tenderness.   Neurological: She appears lethargic.   Skin: She is not diaphoretic.       Significant Labs: All pertinent labs within the past 24 hours have been reviewed.    Significant Imaging: I have reviewed all pertinent imaging results/findings within the past 24 hours.

## 2017-10-14 NOTE — NURSING
Patient switched back to BiPap and appears comfortable.  Accessory muscle usage decreased.  Sating 100%.  On 5 and 15

## 2017-10-15 PROBLEM — E87.0 HYPERNATREMIA: Status: ACTIVE | Noted: 2017-01-01

## 2017-10-15 PROBLEM — Z79.4 TYPE 2 DIABETES MELLITUS WITH HYPERGLYCEMIA, WITH LONG-TERM CURRENT USE OF INSULIN: Status: ACTIVE | Noted: 2017-01-01

## 2017-10-15 PROBLEM — G93.41 ACUTE METABOLIC ENCEPHALOPATHY: Status: ACTIVE | Noted: 2017-01-01

## 2017-10-15 PROBLEM — E11.65 TYPE 2 DIABETES MELLITUS WITH HYPERGLYCEMIA, WITH LONG-TERM CURRENT USE OF INSULIN: Status: ACTIVE | Noted: 2017-01-01

## 2017-10-15 NOTE — ASSESSMENT & PLAN NOTE
-Corrected sodium approx 156  -D5 500cc now over 5 hours, if able to remove BiPAP will start enteral

## 2017-10-15 NOTE — SUBJECTIVE & OBJECTIVE
Interval History: No acute events. This AM Ms. Hays remains confused. She reports persistent abdominal pain. CT remarkable for stool in rectum. Good urine output with lasix and oxygenation improved    Review of Systems   Unable to perform ROS: Mental status change     Objective:     Vital Signs (Most Recent):  Temp: 97.4 °F (36.3 °C) (10/14/17 2300)  Pulse: 109 (10/15/17 0728)  Resp: (!) 24 (10/15/17 0728)  BP: (!) 163/87 (10/15/17 0600)  SpO2: 100 % (10/15/17 0728) Vital Signs (24h Range):  Temp:  [97.4 °F (36.3 °C)-98.4 °F (36.9 °C)] 97.4 °F (36.3 °C)  Pulse:  [] 109  Resp:  [18-41] 24  SpO2:  [92 %-100 %] 100 %  BP: (140-175)/(66-91) 163/87   Weight: 85.6 kg (188 lb 11.4 oz)  Body mass index is 31.4 kg/m².      Intake/Output Summary (Last 24 hours) at 10/15/17 0752  Last data filed at 10/15/17 0600   Gross per 24 hour   Intake             1050 ml   Output             4180 ml   Net            -3130 ml       Physical Exam   Constitutional: She appears lethargic. No distress.   HENT:   Head: Normocephalic and atraumatic.   Eyes: Conjunctivae and EOM are normal.   Neck: Normal range of motion. Neck supple.   Cardiovascular: Normal rate, regular rhythm, normal heart sounds and intact distal pulses.    Pulmonary/Chest: No tachypnea. No respiratory distress. She has rales in the right upper field, the right middle field, the right lower field, the left upper field, the left middle field and the left lower field.   Abdominal: Soft. She exhibits no distension. There is no tenderness.   Musculoskeletal: She exhibits edema (mild DB LE). She exhibits no tenderness.   Neurological: She appears lethargic. She is disoriented.   Skin: She is not diaphoretic.       Vents:  Oxygen Concentration (%): 50 (10/15/17 0728)  Lines/Drains/Airways     Peripherally Inserted Central Catheter Line                 PICC Triple Lumen 10/14/17 left brachial 1 day          Drain                 Urethral Catheter -- days         NG/OG Tube  10/15/17 0207 nasogastric Left nostril less than 1 day              Significant Labs:    CBC/Anemia Profile:    Recent Labs  Lab 10/14/17  0458 10/14/17  1713 10/15/17  0447   WBC 16.04* 12.75* 14.50*   HGB 8.1* 8.6* 9.2*   HCT 24.4* 25.2* 27.1*    144* 159   MCV 75* 75* 75*   RDW 17.9* 17.5* 17.9*        Chemistries:    Recent Labs  Lab 10/13/17  2300 10/14/17  0457 10/14/17  1711 10/14/17  1713 10/15/17  0447   * 148* 147* 147* 150*   K 3.6 3.6 3.7 3.7 3.4*    109 107 107 105   CO2 23 23 25 24 26   BUN 53* 52* 51* 50* 55*   CREATININE 2.3* 2.2* 2.0* 2.0* 2.1*   CALCIUM 9.6 9.8 10.0 10.1 10.1   ALBUMIN 2.9* 3.0* 2.9*  --  3.0*   PROT 7.0 7.4 7.5  --  7.9   BILITOT 1.1* 1.3* 1.4*  --  1.1*   ALKPHOS 260* 273* 272*  --  293*   ALT 38 35 33  --  34   AST 49* 42* 32  --  30   MG 2.2 2.1 1.7  --   --    PHOS 2.6* 2.4*  --   --   --        Recent Labs  Lab 10/14/17  0813   PH 7.402   PCO2 41.2   PO2 231*   HCO3 25.6   POCSATURATED 100   BE 1     Significant Imaging: I have reviewed all pertinent imaging results/findings within the past 24 hours.

## 2017-10-15 NOTE — NURSING TRANSFER
Nursing Transfer Note      10/14/2017     Transfer from [facility] 1009    Transfer via stretcher    Transfer with continuous BiPap, RT at bedside    Transported by JONATHAN Pizarro RN    Medicines sent: Insulin aspart pen, Insulin detemir pen    Chart send with patient: Yes    Notified: JONATHAN Elizabeth    Patient reassessed at: 1900    [facility]

## 2017-10-15 NOTE — CONSULTS
[facility]  Pulmonology  Consult Note    Patient Name: Edie Hays  MRN: [id number]  Admission Date: 10/13/2017  Hospital Length of Stay: 1 days  Code Status: Full Code  Attending Physician: Mike Can, *  Primary Care Provider: Primary Doctor No   Principal Problem: Acute respiratory failure with hypoxia    Inpatient consult to Pulmonology  Consult performed by: RAFFI MORENO  Consult ordered by: JUICE WILKES        Subjective:     HPI:  Ms. Hays is a 73yo female with a PMHx that includes cryptogenic organizing pneumonia on home oxygen, CHF, DM, CKD 3, CAD transferred from [facility] with acute hypoxic respiratory failure. She was intubated for presumed pneumonia and sepsis. She was eventually extubated, stepped down, but did not tolerate diuresis (increasing creatinine). She required continuous bipap and mental status declined. [place] [facility] unavailable.          Past Medical History:   Diagnosis Date    BOOP (bronchiolitis obliterans with organizing pneumonia) 5/2/2017    Overview:  Diagnosed clinically via response to steroids    Breast cancer     Chronic diastolic heart failure     Coronary artery disease involving native coronary artery of native heart without angina pectoris 5/30/2017    Encounter for blood transfusion     Essential hypertension 5/31/2017    HLD (hyperlipidemia) 5/31/2017    NSTEMI (non-ST elevated myocardial infarction) 5/31/2017    Type 2 diabetes mellitus with diabetic polyneuropathy, with long-term current use of insulin 5/31/2017    Last Assessment & Plan:  History of type 2 diabetes managed on basal bolus insulin at home.  She is currently receiving Lantus 35 units HS and Humalog 5 units with meals.  Blood glucose is stable ranging between 167-200.  We will continue to monitor as she is still receiving steroids making her glucose control difficult.This morning, patient had an accucheck documented  at 62 which improved after o    Type 2 diabetes mellitus with stage 3 chronic kidney disease, with long-term current use of insulin 10/13/2017       Past Surgical History:   Procedure Laterality Date    MASTECTOMY         Review of patient's allergies indicates:   Allergen Reactions    Codeine     Lortab [hydrocodone-acetaminophen] Nausea Only    Penicillins        Family History     None        Social History Main Topics    Smoking status: Never Smoker    Smokeless tobacco: Not on file    Alcohol use Not on file    Drug use: Unknown    Sexual activity: Not on file         Review of Systems   Unable to perform ROS: Acuity of condition     Objective:     Vital Signs (Most Recent):  Temp: 98.3 °F (36.8 °C) (10/14/17 1607)  Pulse: 96 (10/14/17 1612)  Resp: (!) 27 (10/14/17 1612)  BP: (!) 140/73 (10/14/17 1630)  SpO2: 99 % (10/14/17 1630) Vital Signs (24h Range):  Temp:  [97.4 °F (36.3 °C)-98.5 °F (36.9 °C)] 98.3 °F (36.8 °C)  Pulse:  [] 96  Resp:  [16-32] 27  SpO2:  [92 %-100 %] 99 %  BP: (134-175)/(64-81) 140/73     Weight: 85.6 kg (188 lb 11.4 oz)  Body mass index is 31.4 kg/m².      Intake/Output Summary (Last 24 hours) at 10/14/17 1849  Last data filed at 10/14/17 1644   Gross per 24 hour   Intake             1000 ml   Output             2950 ml   Net            -1950 ml       Physical Exam   Constitutional: She appears well-developed.   Obese   HENT:   Head: Normocephalic and atraumatic.   Cardiovascular: Normal rate and regular rhythm.    Pulmonary/Chest: She has rales.   Bipap 15/8  Tachypneic   Abdominal: Soft. Bowel sounds are normal.   Skin: Skin is warm and dry.   Nursing note and vitals reviewed.      Vents:  Oxygen Concentration (%): 50 (10/14/17 1612)    Lines/Drains/Airways     Drain                 Urethral Catheter -- days                Significant Labs:    CBC/Anemia Profile:    Recent Labs  Lab 10/13/17  2300 10/14/17  0458 10/14/17  1713   WBC 13.35* 16.04* 12.75*   HGB 7.6* 8.1*  8.6*   HCT 21.9* 24.4* 25.2*   * 157 144*   MCV 74* 75* 75*   RDW 17.8* 17.9* 17.5*        Chemistries:    Recent Labs  Lab 10/13/17  2300 10/14/17  0457 10/14/17  1711 10/14/17  1713   * 148* 147* 147*   K 3.6 3.6 3.7 3.7    109 107 107   CO2 23 23 25 24   BUN 53* 52* 51* 50*   CREATININE 2.3* 2.2* 2.0* 2.0*   CALCIUM 9.6 9.8 10.0 10.1   ALBUMIN 2.9* 3.0* 2.9*  --    PROT 7.0 7.4 7.5  --    BILITOT 1.1* 1.3* 1.4*  --    ALKPHOS 260* 273* 272*  --    ALT 38 35 33  --    AST 49* 42* 32  --    MG 2.2 2.1 1.7  --    PHOS 2.6* 2.4*  --   --        All pertinent labs within the past 24 hours have been reviewed.    Significant Imaging:   I have reviewed all pertinent imaging results/findings within the past 24 hours.    Assessment/Plan:     Acute on chronic respiratory failure with hypoxia    · Due to pt's relative obtundation, although improved, recommend transfer to ICU  · Despite pt's elevated creatinine, she needs aggressive diuresis - she responded well to 80mg lasix x1 but her CXR shows she has a long way to go and may need dialysis to effectively manage her volume  · Bipap with a PEEP minimum of [id number]  · Unclear etiology of respiratory failure at this time, need outside records ASAP. DDx includes , CAP, HCAP, CHF.  · Recommend broad spectrum abx and cultures at this time     Acute on chronic diastolic heart failure    · Known diastolic dysfunction  · Pt has pulmonary edema on CXR but no edema peripherally, +crackles in bases  · Recommend aggressive diuresis and HD/CRRT as necessary  · Recommend checking troponin to evaluate for ongoing ischemia in setting of known CAD     Cryptogenic organizing pneumonia    Diagnosed clinically in response to steroids  Would be cautious with resuming   Need outside records              Thank you for your consult. I will sign off. Please contact us if you have any additional questions. Pt will be followed by ICU.     Frederic Meehan MD  Pulmonology  [facility]

## 2017-10-15 NOTE — ASSESSMENT & PLAN NOTE
· Known diastolic dysfunction  · Pt has pulmonary edema on CXR but no edema peripherally, +crackles in bases  · Recommend aggressive diuresis and HD/CRRT as necessary  · Recommend checking troponin to evaluate for ongoing ischemia in setting of known CAD

## 2017-10-15 NOTE — ASSESSMENT & PLAN NOTE
71 yo female with PMH of cryptogenic pneumonia on home oxygen, essential HTN, HLP, Type 2 diabetes, CKD stage 3, and known 3 vessel CAD on whom ID is consulted for possible infectious component to her resp failure. By reports her outside cxs were negative and had no real response to antibiotics. She is afebrile and has a leukocytosis but no left shift or bands    -based on the above information I think it is fairly unlikely that infection has cause her worsening  -viral infection is possible so agree with RSV pcr  -continue CTX for now  -would not continue vanco as culture data doesn't support its use  -await possible bronch and pending studies  -if she worsens can start cefepime and vanco

## 2017-10-15 NOTE — PROGRESS NOTES
"[facility]  Infectious Disease  Progress Note    Patient Name: Edie Hays  MRN: [id number]  Admission Date: 10/13/2017  Length of Stay: 2 days  Attending Physician: Dewayne Villeda MD  Primary Care Provider: Primary Doctor No    Isolation Status: No active isolations  Assessment/Plan:      Acute respiratory failure with hypoxia-resolved as of 10/14/2017    71 yo female with PMH of cryptogenic pneumonia on home oxygen, essential HTN, HLP, Type 2 diabetes, CKD stage 3, and known 3 vessel CAD on whom ID is consulted for possible infectious component to her resp failure. By reports her outside cxs were negative and had no real response to antibiotics. She is afebrile and has a leukocytosis but no left shift or bands    -based on the above information I think it is fairly unlikely that infection has cause her worsening  -viral infection is possible so agree with RSV pcr  -continue CTX for now  -would not continue vanco as culture data doesn't support its use  -await possible bronch and pending studies  -if she worsens can start cefepime and vanco                Thank you for your consult. I will follow-up with patient. Please contact us if you have any additional questions.    Helder Garcia MD  Infectious Disease  [facility]    Subjective:     Principal Problem:Acute on chronic diastolic heart failure    HPI: Patient is on high flow NC and not able to talk due to acuity of illness    Hx is from chart    "71 yo female with PMH of cryptogenic pneumonia on home oxygen, essential HTN, HLP, Type 2 diabetes, CKD stage 3, and known 3 vessel CAD was admitted to  with acute hypoxic respiratory failure requiring initial hospitalization in ICU and intubated. Patient now extubated but still requiring intermittent BiPAP and oxygen for her respiratory failure. Attempts have been made to diuresis patient with IV lasix but resulted in worsening of renal function so have " "backed off diuresis and trying to use IV Diuril without much success. At this point due of not improvement in respiratory status request has been made for transfer to higher level of care and for Pulmonary evaluation due top patient's history of cytogenic pneumonia (BOOP) which responded to steroids in past to look for other possibilities for cause of continued respiratory issues besides just cardiac"    Sputum cx was negative at outside hospital and she was treated with linezolid, levaquin, and cefepime.     Here she has been placed on CTX.  Interval History: No events    Review of Systems   Unable to perform ROS: Acuity of condition     Objective:     Vital Signs (Most Recent):  Temp: 98.8 °F (37.1 °C) (10/15/17 1100)  Pulse: 101 (10/15/17 1245)  Resp: 20 (10/15/17 1245)  BP: (!) 141/63 (10/15/17 1200)  SpO2: 96 % (10/15/17 1245) Vital Signs (24h Range):  Temp:  [97.2 °F (36.2 °C)-98.8 °F (37.1 °C)] 98.8 °F (37.1 °C)  Pulse:  [] 101  Resp:  [16-41] 20  SpO2:  [92 %-100 %] 96 %  BP: (133-175)/(63-94) 141/63     Weight: 85.6 kg (188 lb 11.4 oz)  Body mass index is 31.4 kg/m².    Estimated Creatinine Clearance: 26.9 mL/min (based on SCr of 2.1 mg/dL (H)).    Physical Exam   Constitutional: She appears lethargic. No distress.   HENT:   Head: Normocephalic and atraumatic.   Eyes: Conjunctivae and EOM are normal.   Neck: Normal range of motion. Neck supple.   Cardiovascular: Normal rate, regular rhythm, normal heart sounds and intact distal pulses.    Pulmonary/Chest: No tachypnea. No respiratory distress. She has rales in the right upper field, the right middle field, the right lower field, the left upper field, the left middle field and the left lower field.   Abdominal: Soft. She exhibits no distension. There is no tenderness.   Musculoskeletal: She exhibits edema (mild DB LE). She exhibits no tenderness.   Neurological: She appears lethargic. She is disoriented.   Skin: She is not diaphoretic. "       Significant Labs: All pertinent labs within the past 24 hours have been reviewed.    Significant Imaging: I have reviewed all pertinent imaging results/findings within the past 24 hours.

## 2017-10-15 NOTE — CONSULTS
"RN generated consult received re: "A1C". Pt's A1C is 6.9. Per glycemic index committee standards, diet educ is not necessary unless A1C is >9.0. Also, pt is on bipap & inappropriate for any type of diet educ at this time. Please re-consult prn.   "

## 2017-10-15 NOTE — ASSESSMENT & PLAN NOTE
- patient presenting hypoxic, tachypneic, and was intiially on BiPAP  - given circumstance, possible that patient is fluid overloaded under setting of increased pulmonary hypertension 2/2 to underlying lung disease  -CxR with severe DB infiltrates  -BNP >1900, trop mildly elevated 2/2 demand but improving.  Repeat 2D echo WNL, no significant decrease in EF, noted diastolic dysfunction, or WMA   -ID consulted, recommended continuation of rocephin for now  -Hydrocortisone 80mg BID for possible  vs BOOP   -Continue lasix 80mg IV TID  -Mg>2, K>4 goals  -Strict I/O  -weaned down to high flow nasal cannula, can likely transition to regular nasal cannula later in afternoon with continued diuresis

## 2017-10-15 NOTE — ASSESSMENT & PLAN NOTE
-Per [facility], [facility] 05/2017 w/ >70% stenosis to LCx, LAD, RCA, CABG deferred 2/2 Pulm HTN  -Continue ASA/Plavix

## 2017-10-15 NOTE — ASSESSMENT & PLAN NOTE
-Likely acute on chronic CHF exacerbation  -CxR with severe DB infiltrates  -BNP >1900, trop mildly elevated 2/2 demand but improving  -Possibly some underlying condition   -ECHO with diastolic dysfunction, estimated PAP 36 which is improved from 70 from OSH records  -Continue lasix 80mg IV TID  -Mg>2, K>4 goals  -Strict I/O  -On BiPAP with EPAP 10, will attempt to wean to comfort flow today

## 2017-10-15 NOTE — PROGRESS NOTES
[facility]  Critical Care Medicine  Progress Note    Patient Name: Edie Hays  MRN: [id number]  Admission Date: 10/13/2017  Hospital Length of Stay: 2 days  Code Status: Full Code  Attending Provider: Dewayne Villeda MD  Primary Care Provider: Primary Doctor No   Principal Problem: Acute on chronic diastolic heart failure    Subjective:     HPI:  71 y/o PMH cryptogenic PNA on home oxygen therapy which per report has responded to steroids in the past, HTN, IDDM2, CKD3, CAD was admitted as a transfer for Pulm eval from [facility]. Initially admitted with septic shock 10/07 and intubated and transferred to ICU. Treated with zyvox, levoquin and cefepime. At that admission her sputum cx, UCx and BCx were negative. Per report attempts at diuresis prior to transfer including diuril and lasix were unsuccessful.     Initial cryptogenic PNA was diagnosed 05/2017 where she was discharged on a steroid taper starting at 60mg and currently on 20mg qd.     Overnight she had worsening lethargy per her . Placed on BiPAP overnight. She currently is arousable but confused and denies any pain, shortness of breath. Her  reports lethargy worsened after getting ativan at 3AM.     Hospital/ICU Course:  No notes on file    Interval History: No acute events. This AM Ms. Hays remains confused. She reports persistent abdominal pain. CT remarkable for stool in rectum. Good urine output with lasix and oxygenation improved    Review of Systems   Unable to perform ROS: Mental status change     Objective:     Vital Signs (Most Recent):  Temp: 97.4 °F (36.3 °C) (10/14/17 2300)  Pulse: 109 (10/15/17 0728)  Resp: (!) 24 (10/15/17 0728)  BP: (!) 163/87 (10/15/17 0600)  SpO2: 100 % (10/15/17 0728) Vital Signs (24h Range):  Temp:  [97.4 °F (36.3 °C)-98.4 °F (36.9 °C)] 97.4 °F (36.3 °C)  Pulse:  [] 109  Resp:  [18-41] 24  SpO2:  [92 %-100 %] 100 %  BP: (140-175)/(66-91) 163/87   Weight: 85.6 kg (188  lb 11.4 oz)  Body mass index is 31.4 kg/m².      Intake/Output Summary (Last 24 hours) at 10/15/17 0752  Last data filed at 10/15/17 0600   Gross per 24 hour   Intake             1050 ml   Output             4180 ml   Net            -3130 ml       Physical Exam   Constitutional: She appears lethargic. No distress.   HENT:   Head: Normocephalic and atraumatic.   Eyes: Conjunctivae and EOM are normal.   Neck: Normal range of motion. Neck supple.   Cardiovascular: Normal rate, regular rhythm, normal heart sounds and intact distal pulses.    Pulmonary/Chest: No tachypnea. No respiratory distress. She has rales in the right upper field, the right middle field, the right lower field, the left upper field, the left middle field and the left lower field.   Abdominal: Soft. She exhibits no distension. There is no tenderness.   Musculoskeletal: She exhibits edema (mild DB LE). She exhibits no tenderness.   Neurological: She appears lethargic. She is disoriented.   Skin: She is not diaphoretic.       Vents:  Oxygen Concentration (%): 50 (10/15/17 0728)  Lines/Drains/Airways     Peripherally Inserted Central Catheter Line                 PICC Triple Lumen 10/14/17 left brachial 1 day          Drain                 Urethral Catheter -- days         NG/OG Tube 10/15/17 0207 nasogastric Left nostril less than 1 day              Significant Labs:    CBC/Anemia Profile:    Recent Labs  Lab 10/14/17  0458 10/14/17  1713 10/15/17  0447   WBC 16.04* 12.75* 14.50*   HGB 8.1* 8.6* 9.2*   HCT 24.4* 25.2* 27.1*    144* 159   MCV 75* 75* 75*   RDW 17.9* 17.5* 17.9*        Chemistries:    Recent Labs  Lab 10/13/17  2300 10/14/17  0457 10/14/17  1711 10/14/17  1713 10/15/17  0447   * 148* 147* 147* 150*   K 3.6 3.6 3.7 3.7 3.4*    109 107 107 105   CO2 23 23 25 24 26   BUN 53* 52* 51* 50* 55*   CREATININE 2.3* 2.2* 2.0* 2.0* 2.1*   CALCIUM 9.6 9.8 10.0 10.1 10.1   ALBUMIN 2.9* 3.0* 2.9*  --  3.0*   PROT 7.0 7.4 7.5  --   7.9   BILITOT 1.1* 1.3* 1.4*  --  1.1*   ALKPHOS 260* 273* 272*  --  293*   ALT 38 35 33  --  34   AST 49* 42* 32  --  30   MG 2.2 2.1 1.7  --   --    PHOS 2.6* 2.4*  --   --   --        Recent Labs  Lab 10/14/17  0813   PH 7.402   PCO2 41.2   PO2 231*   HCO3 25.6   POCSATURATED 100   BE 1     Significant Imaging: I have reviewed all pertinent imaging results/findings within the past 24 hours.    Assessment/Plan:     Neuro   Acute metabolic encephalopathy    -Likely 2/2 acute illness vs hypernatremia          Pulmonary   Acute on chronic respiratory failure with hypoxia    -Likely acute on chronic CHF exacerbation  -CxR with severe DB infiltrates  -BNP >1900, trop mildly elevated 2/2 demand but improving  -Possibly some underlying condition   -On Vanc/CTX for possible PNA, on broad spectrum Abx for a week at [place] without much change  -ID consulted appreciate help  -Hydrocortisone 80mg BID for possible  vs BOOP   -ECHO with diastolic dysfunction, estimated PAP 36 which is improved from 70 from OSH records  -Continue lasix 80mg IV TID  -Mg>2, K>4 goals  -Strict I/O  -On BiPAP with EPAP 10, will attempt to wean to comfort flow today        Cardiac/Vascular   * Acute on chronic diastolic heart failure    -See respiratory failure        HLD (hyperlipidemia)    -Continue home statin        Essential hypertension    -Continue home amlodipine        Coronary artery disease involving native coronary artery of native heart without angina pectoris    -Per [facility], [facility] 05/2017 w/ >70% stenosis to LCx, LAD, RCA, CABG deferred 2/2 Pulm HTN  -Continue ASA/Plavix        Renal/   Hypernatremia    -Corrected sodium approx 156  -D5 500cc now over 5 hours, if able to remove BiPAP will start enteral        Endocrine   Type 2 diabetes mellitus with hyperglycemia, with long-term current use of insulin    -Home regimen of aspart 15 AC and glargine 50 qd  -Currently BS >400s without acidosis  -insulin gtt started        Type 2  diabetes mellitus with stage 3 chronic kidney disease, with long-term current use of insulin    -See DM2 w/ hyperglycemia           Critical Care Daily Checklist:    A: Awake: RASS Goal/Actual Goal:    Actual: [place] Agitation Sedation Scale (RASS): Agitated   B: Spontaneous Breathing Trial Performed?     C: SAT & SBT Coordinated?  na                      D: Delirium: CAM-ICU Overall CAM-ICU: Positive   E: Early Mobility Performed? No   F: Feeding Goal:    Status:     Current Diet Order   Procedures    Diet NPO      AS: Analgesia/Sedation na   T: Thromboembolic Prophylaxis heparin   H: HOB > 300 Yes   U: Stress Ulcer Prophylaxis (if needed) na   G: Glucose Control Insulin gtt   B: Bowel Function Stool Occurrence: 1   I: Indwelling Catheter (Lines & Peoples) Necessity PIV, Peoples for diuresis   D: De-escalation of Antimicrobials/Pharmacotherapies Vanc/CTX    Plan for the day/ETD diuresis    Code Status:  Family/Goals of Care: Full Code         Tarun Cabrera IV, MD  Critical Care Medicine  [facility]

## 2017-10-15 NOTE — ASSESSMENT & PLAN NOTE
-Home regimen of aspart 15 AC and glargine 50 qd  -Currently BS >400s without acidosis  -insulin gtt started

## 2017-10-15 NOTE — SUBJECTIVE & OBJECTIVE
Interval History: No events    Review of Systems   Unable to perform ROS: Acuity of condition     Objective:     Vital Signs (Most Recent):  Temp: 98.8 °F (37.1 °C) (10/15/17 1100)  Pulse: 101 (10/15/17 1245)  Resp: 20 (10/15/17 1245)  BP: (!) 141/63 (10/15/17 1200)  SpO2: 96 % (10/15/17 1245) Vital Signs (24h Range):  Temp:  [97.2 °F (36.2 °C)-98.8 °F (37.1 °C)] 98.8 °F (37.1 °C)  Pulse:  [] 101  Resp:  [16-41] 20  SpO2:  [92 %-100 %] 96 %  BP: (133-175)/(63-94) 141/63     Weight: 85.6 kg (188 lb 11.4 oz)  Body mass index is 31.4 kg/m².    Estimated Creatinine Clearance: 26.9 mL/min (based on SCr of 2.1 mg/dL (H)).    Physical Exam   Constitutional: She appears lethargic. No distress.   HENT:   Head: Normocephalic and atraumatic.   Eyes: Conjunctivae and EOM are normal.   Neck: Normal range of motion. Neck supple.   Cardiovascular: Normal rate, regular rhythm, normal heart sounds and intact distal pulses.    Pulmonary/Chest: No tachypnea. No respiratory distress. She has rales in the right upper field, the right middle field, the right lower field, the left upper field, the left middle field and the left lower field.   Abdominal: Soft. She exhibits no distension. There is no tenderness.   Musculoskeletal: She exhibits edema (mild DB LE). She exhibits no tenderness.   Neurological: She appears lethargic. She is disoriented.   Skin: She is not diaphoretic.       Significant Labs: All pertinent labs within the past 24 hours have been reviewed.    Significant Imaging: I have reviewed all pertinent imaging results/findings within the past 24 hours.

## 2017-10-15 NOTE — PLAN OF CARE
Problem: Patient Care Overview  Goal: Plan of Care Review  No acute events noted throughout shift. Insulin gtt @ 7.5 units/hr. Patient resting in bed with comfort flow cannula in place 40% FiO2. Oriented to self. Enema administered this AM, 1 BM noted post enema. Normal sinus rhythm EKG. Pain treated with PRN medication. Bilateral soft wrist restraints remain in place. Plan of care discussed with patient and children, questions answered. VSS. Will continue to monitor.

## 2017-10-15 NOTE — ASSESSMENT & PLAN NOTE
Diagnosed clinically in response to steroids  Would be cautious with resuming   Need outside records

## 2017-10-15 NOTE — NURSING
[facility]                Nursing Procedure Note            CORTRAK 2 Enternal Access System (EAS)    8 [place] 43 inch CORTRAK post-pyloric feeding tube inserted to 92cm via the left nare using the CORTRAK 2 Enternal Access System (EAS) device to assist with placement. Patient tolerated procedure well.Awaiting abdominal x-ray to confirm placement.      0209- Portable KUB obtained and placement confirmed in Small bowel D3. [facility] notified per nurse to obtain order to use.

## 2017-10-16 NOTE — PLAN OF CARE
Problem: Patient Care Overview  Goal: Plan of Care Review  No acute events noted throughout shift. Insulin gtt @ 4.1 units/hr. Patient alert and oriented to self. 40% comfort flow cannula in place. Bilateral soft wrist restraints in place. Palpable pulses. No signs of distress. Plan of care discussed with patient and son. VSS. Will continue to monitor.

## 2017-10-16 NOTE — PROGRESS NOTES
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 Initially admitted with septic shock 10/07 and intubated and transferred to ICU. Treated with zyvox, levoquin and cefepime. At that admission her sputum cx, UCx and BCx were negative. Per report attempts at diuresis prior to transfer including diuril and lasix were unsuccessful.     Initial cryptogenic PNA was diagnosed 05/2017 where she was discharged on a steroid taper starting at 60mg and currently on 20mg qd.     Overnight she had worsening lethargy per her . Placed on BiPAP overnight. She currently is arousable but confused and denies any pain, shortness of breath. Her  reports lethargy worsened after getting ativan at 3AM.     Interval History: No acute events noted overnight.  Patient seen in AM responding to voice, but incoherent and non-purposeful responses.   at bedside, states that this is an improvement from the day prior.  Saturating 100% on 40% FiO2 and 20L, which was subsequently decreased to 10L.  Net negative 1.6 liters in last 24 hours.      Review of Systems   Unable to perform ROS: Mental status change     Objective:     Vital Signs (Most Recent):  Temp: 98.2 °F (36.8 °C) (10/16/17 1100)  Pulse: 97 (10/16/17 1200)  Resp: (!) 27 (10/16/17 1200)  BP: 128/73 (10/16/17 1200)  SpO2: 96 % (10/16/17 1200) Vital Signs (24h Range):  Temp:  [97.7 °F (36.5 °C)-98.4 °F (36.9 °C)] 98.2 °F (36.8 °C)  Pulse:  []  97  Resp:  [16-44] 27  SpO2:  [93 %-100 %] 96 %  BP: (113-170)/(59-87) 128/73   Weight: 85.6 kg (188 lb 11.4 oz)  Body mass index is 31.4 kg/m².      Intake/Output Summary (Last 24 hours) at 10/16/17 1250  Last data filed at 10/16/17 1200   Gross per 24 hour   Intake           650.34 ml   Output             1720 ml   Net         -1069.66 ml       Physical Exam   Constitutional: She appears lethargic. No distress.   HENT:   Head: Normocephalic and atraumatic.   Eyes: Conjunctivae and EOM are normal.   Neck: Normal range of motion. Neck supple.   Cardiovascular: Normal rate, regular rhythm, normal heart sounds and intact distal pulses.    Pulmonary/Chest: No tachypnea. No respiratory distress. She has rales in the right upper field, the right middle field, the right lower field, the left upper field, the left middle field and the left lower field.   Abdominal: Soft. She exhibits no distension. There is no tenderness.   Musculoskeletal: She exhibits edema (mild DB LE). She exhibits no tenderness.   Neurological: She appears lethargic. She is disoriented.   Skin: She is not diaphoretic.       Vents:  Oxygen Concentration (%): 40 (10/16/17 1200)  Lines/Drains/Airways     Peripherally Inserted Central Catheter Line                 PICC Triple Lumen 10/14/17 left brachial 2 days          Drain                 Urethral Catheter -- days         NG/OG Tube 10/15/17 0207 nasogastric Left nostril 1 day              Significant Labs:    Recent Results (from the past 14 hour(s))   POCT glucose    Collection Time: 10/15/17 11:56 PM   Result Value Ref Range    POCT Glucose 185 (H) 70 - 110 mg/dL   Basic metabolic panel    Collection Time: 10/15/17 11:57 PM   Result Value Ref Range    Sodium 158 (H) 136 - 145 mmol/L    Potassium 4.5 3.5 - 5.1 mmol/L    Chloride 113 (H) 95 - 110 mmol/L    CO2 31 (H) 23 - 29 mmol/L    Glucose 177 (H) 70 - 110 mg/dL    BUN, Bld 70 (H) 8 - 23 mg/dL    Creatinine 2.1 (H) 0.5 - 1.4 mg/dL    Calcium 10.3  8.7 - 10.5 mg/dL    Anion Gap 14 8 - 16 mmol/L    eGFR if African American 26.9 (A) >60 mL/min/1.73 m^2    eGFR if non  23.3 (A) >60 mL/min/1.73 m^2   POCT glucose    Collection Time: 10/16/17  2:15 AM   Result Value Ref Range    POCT Glucose 220 (H) 70 - 110 mg/dL   Comprehensive metabolic panel    Collection Time: 10/16/17  3:28 AM   Result Value Ref Range    Sodium 155 (H) 136 - 145 mmol/L    Potassium 4.1 3.5 - 5.1 mmol/L    Chloride 111 (H) 95 - 110 mmol/L    CO2 30 (H) 23 - 29 mmol/L    Glucose 226 (H) 70 - 110 mg/dL    BUN, Bld 74 (H) 8 - 23 mg/dL    Creatinine 2.1 (H) 0.5 - 1.4 mg/dL    Calcium 9.8 8.7 - 10.5 mg/dL    Total Protein 7.9 6.0 - 8.4 g/dL    Albumin 3.0 (L) 3.5 - 5.2 g/dL    Total Bilirubin 0.9 0.1 - 1.0 mg/dL    Alkaline Phosphatase 291 (H) 55 - 135 U/L    AST 49 (H) 10 - 40 U/L    ALT 36 10 - 44 U/L    Anion Gap 14 8 - 16 mmol/L    eGFR if African American 26.9 (A) >60 mL/min/1.73 m^2    eGFR if non  23.3 (A) >60 mL/min/1.73 m^2   CBC auto differential    Collection Time: 10/16/17  3:28 AM   Result Value Ref Range    WBC 22.02 (H) 3.90 - 12.70 K/uL    RBC 3.92 (L) 4.00 - 5.40 M/uL    Hemoglobin 9.6 (L) 12.0 - 16.0 g/dL    Hematocrit 29.4 (L) 37.0 - 48.5 %    MCV 75 (L) 82 - 98 fL    MCH 24.5 (L) 27.0 - 31.0 pg    MCHC 32.7 32.0 - 36.0 g/dL    RDW 18.5 (H) 11.5 - 14.5 %    Platelets 185 150 - 350 K/uL    MPV 9.9 9.2 - 12.9 fL    Immature Granulocytes 1.6 (H) 0.0 - 0.5 %    Gran # 18.5 (H) 1.8 - 7.7 K/uL    Immature Grans (Abs) 0.35 (H) 0.00 - 0.04 K/uL    Lymph # 1.8 1.0 - 4.8 K/uL    Mono # 1.3 (H) 0.3 - 1.0 K/uL    Eos # 0.0 0.0 - 0.5 K/uL    Baso # 0.05 0.00 - 0.20 K/uL    nRBC 0 0 /100 WBC    Gran% 84.0 (H) 38.0 - 73.0 %    Lymph% 8.3 (L) 18.0 - 48.0 %    Mono% 5.9 4.0 - 15.0 %    Eosinophil% 0.0 0.0 - 8.0 %    Basophil% 0.2 0.0 - 1.9 %    Differential Method Automated    Magnesium    Collection Time: 10/16/17  3:28 AM   Result Value Ref Range    Magnesium 2.2  1.6 - 2.6 mg/dL   Phosphorus    Collection Time: 10/16/17  3:28 AM   Result Value Ref Range    Phosphorus 1.6 (L) 2.7 - 4.5 mg/dL   POCT glucose    Collection Time: 10/16/17  3:32 AM   Result Value Ref Range    POCT Glucose 254 (H) 70 - 110 mg/dL   POCT glucose    Collection Time: 10/16/17  4:31 AM   Result Value Ref Range    POCT Glucose 197 (H) 70 - 110 mg/dL   Basic metabolic panel    Collection Time: 10/16/17 10:15 AM   Result Value Ref Range    Sodium 157 (H) 136 - 145 mmol/L    Potassium 4.1 3.5 - 5.1 mmol/L    Chloride 112 (H) 95 - 110 mmol/L    CO2 32 (H) 23 - 29 mmol/L    Glucose 160 (H) 70 - 110 mg/dL    BUN, Bld 79 (H) 8 - 23 mg/dL    Creatinine 2.3 (H) 0.5 - 1.4 mg/dL    Calcium 10.2 8.7 - 10.5 mg/dL    Anion Gap 13 8 - 16 mmol/L    eGFR if African American 24.1 (A) >60 mL/min/1.73 m^2    eGFR if non African American 20.9 (A) >60 mL/min/1.73 m^2       Significant Imaging: I have reviewed all pertinent imaging results/findings within the past 24 hours.    Assessment/Plan:     Neuro   Acute metabolic encephalopathy    -Likely 2/2 acute illness vs hypernatremia          Pulmonary   Acute on chronic respiratory failure with hypoxia    - patient presenting hypoxic, tachypneic, and was intiially on BiPAP  - given circumstance, possible that patient is fluid overloaded under setting of increased pulmonary hypertension 2/2 to underlying lung disease  -CxR with severe DB infiltrates  -BNP >1900, trop mildly elevated 2/2 demand but improving.  Repeat 2D echo WNL, no significant decrease in EF, noted diastolic dysfunction, or WMA   -ID consulted, recommended continuation of rocephin for now  -Hydrocortisone 80mg BID for possible  vs BOOP   -Continue lasix 80mg IV TID  -Mg>2, K>4 goals  -Strict I/O  -weaned down to high flow nasal cannula, can likely transition to regular nasal cannula later in afternoon with continued diuresis         Cardiac/Vascular   * Acute on chronic diastolic heart failure    -See  respiratory failure        HLD (hyperlipidemia)    -Continue home statin        Essential hypertension    -Continue home amlodipine  - BP WNL in last 24 hours         Coronary artery disease involving native coronary artery of native heart without angina pectoris    - Per [facility], [facility] 05/2017 w/ >70% stenosis to LCx, LAD, RCA, CABG deferred 2/2 Pulm HTN  - will likely need CABG in future, but may need to hold off for now given acute illness  - overall, situation difficult, as we do not have a known reason for hypoxia.  Likely primary lung issue causing elevated PASP.  May need lung biopsy as well in future           Renal/   Hypernatremia    - Na in   - free water defecit 4.1 L  - will continue enteric bolusses, will do 400 mL q 4 hours         Oncology   Normocytic anemia    - microcytic anemia, will order Iron, TIBC, and ferritin  - hemoglobin stable at 9.6 with no reason/evidence for bleeding, continue daily CBCs         Endocrine   Type 2 diabetes mellitus with hyperglycemia, with long-term current use of insulin    -Home regimen of aspart 15 AC and glargine 50 qd  -Currently BS >400s without acidosis  -continue insulin gtt for now although BG WNL, may need to keep if D5 needed to help correct hypernatremia         Type 2 diabetes mellitus with stage 3 chronic kidney disease, with long-term current use of insulin    -See DM2 w/ hyperglycemia           Critical Care Daily Checklist:    A: Awake: RASS Goal/Actual Goal: RASS Goal: 0-->alert and calm  Actual: [place] Agitation Sedation Scale (RASS): Drowsy   B: Spontaneous Breathing Trial Performed?     C: SAT & SBT Coordinated?  None needed                      D: Delirium: CAM-ICU Overall CAM-ICU: Positive   E: Early Mobility Performed? Yes   F: Feeding Goal:    Status:     Current Diet Order   Procedures    Diet NPO   Speech eval   AS: Analgesia/Sedation none   T: Thromboembolic Prophylaxis Heparin 5000 TID   H: HOB > 300 Yes   U: Stress Ulcer  Prophylaxis (if needed) none   G: Glucose Control Insulin gtt   B: Bowel Function Stool Occurrence: 1   I: Indwelling Catheter (Lines & Trevizo) Necessity PICC, trevizo   D: De-escalation of Antimicrobials/Pharmacotherapies rocephin    Plan for the day/ETD Speech eval, wean O2, diurese    Code Status:  Family/Goals of Care: Full Code         Critical secondary to Patient has a condition that poses threat to life and bodily function: hypoxic respiratory failure       Critical care was time spent personally by me on the following activities: development of treatment plan with patient or surrogate and bedside caregivers, discussions with consultants, evaluation of patient's response to treatment, examination of patient, ordering and performing treatments and interventions, ordering and review of laboratory studies, ordering and review of radiographic studies, pulse oximetry, re-evaluation of patient's condition. This critical care time did not overlap with that of any other provider or involve time for any procedures.     Salo Reynolds MD  Critical Care Medicine  [facility]

## 2017-10-16 NOTE — PROGRESS NOTES
[facility] with weakness, debility, [facility] for max / total assist. PT/OT recommending SNF at discharge. Not a candidate for [facility].    Removed from hf list.

## 2017-10-16 NOTE — PLAN OF CARE
Payor: [employer]IC[employer] / Plan: [employer] / Product Type: Medicare Advantage /     No future appointments.    Extended Emergency Contact Information  Primary Emergency Contact: Mago Del Cid   [facility]  Home Phone: [phone]  Mobile Phone: [phone]  Relation: Daughter     10/16/17 1238   Discharge Assessment   Assessment Type Discharge Planning Assessment   Confirmed/corrected address and phone number on facesheet? Yes   Assessment information obtained from? Patient;Medical Record   Expected Length of Stay (days) 4   Communicated expected length of stay with patient/caregiver no   Prior to hospitilization cognitive status: Alert/Oriented   Prior to hospitalization functional status: Assistive Equipment;Needs Assistance   Current cognitive status: Alert/Oriented   Current Functional Status: Assistive Equipment;Needs Assistance   Facility Arrived From: [facility]   Lives With child(sonny), adult   Able to Return to Prior Arrangements yes   Is patient able to care for self after discharge? Yes  (with assist)   Who are your caregiver(s) and their phone number(s)? Gilberto Hays (son) [phone]; Daksha Spicerzully (daughter) [phone]   Patient's perception of discharge disposition home health   Readmission Within The Last 30 Days no previous admission in last 30 days   Patient currently being followed by outpatient case management? No   Patient currently receives any other outside agency services? Yes   Name and contact number of agency or person providing outside services [facility]   Is it the patient/care giver preference to resume care with the current outside agency? Yes   Equipment Currently Used at Home bedside commode;cane, straight;oxygen;shower chair;rollator   Do you have any problems affording any of your prescribed medications? No   Is the patient taking medications as prescribed? yes   Does the patient have transportation home? Yes    Transportation Available family or friend will provide   Does the patient receive services at the [facility]? No   Discharge Plan A Home Health;Home with family   Discharge Plan B Skilled Nursing Facility   Patient/Family In Agreement With Plan yes   Hannah Jackson RN, BSN  Case Management  [facility]  Ext. [phone]

## 2017-10-16 NOTE — ASSESSMENT & PLAN NOTE
- Per Giovanny Medical, [facility] 05/2017 w/ >70% stenosis to LCx, LAD, RCA, CABG deferred 2/2 Pulm HTN  - will likely need CABG in future, but may need to hold off for now given acute illness  - overall, situation difficult, as we do not have a known reason for hypoxia.  Likely primary lung issue causing elevated PASP.  May need lung biopsy as well in future

## 2017-10-16 NOTE — ASSESSMENT & PLAN NOTE
- microcytic anemia, will order Iron, TIBC, and ferritin  - hemoglobin stable at 9.6 with no reason/evidence for bleeding, continue daily CBCs

## 2017-10-16 NOTE — ASSESSMENT & PLAN NOTE
73 yo female with PMH of cryptogenic pneumonia on home oxygen, essential HTN, HLP, Type 2 diabetes, CKD stage 3, and known 3 vessel CAD on whom ID is consulted for possible infectious component to her resp failure. By reports her outside cxs were negative and had no real response to antibiotics. She is afebrile and has a leukocytosis but no left shift or bands    -based on the above information I think it is fairly unlikely that infection has cause her worsening  -would stop CTX and monitor  -leukocytosis is likely steroid related  -await possible bronch and pending studies  -if she worsens can start cefepime and vanco

## 2017-10-16 NOTE — ASSESSMENT & PLAN NOTE
-Home regimen of aspart 15 AC and glargine 50 qd  -Currently BS >400s without acidosis  -continue insulin gtt for now although BG WNL, may need to keep if D5 needed to help correct hypernatremia

## 2017-10-16 NOTE — PROGRESS NOTES
"[facility]  Infectious Disease  Progress Note    Patient Name: Edie Hays  MRN: [id number]  Admission Date: 10/13/2017  Length of Stay: 3 days  Attending Physician: Miguel A Campbell MD  Primary Care Provider: Primary Doctor No    Isolation Status: No active isolations  Assessment/Plan:      Acute respiratory failure with hypoxia-resolved as of 10/14/2017    71 yo female with PMH of cryptogenic pneumonia on home oxygen, essential HTN, HLP, Type 2 diabetes, CKD stage 3, and known 3 vessel CAD on whom ID is consulted for possible infectious component to her resp failure. By reports her outside cxs were negative and had no real response to antibiotics. She is afebrile and has a leukocytosis but no left shift or bands    -based on the above information I think it is fairly unlikely that infection has cause her worsening  -would stop CTX and monitor  -leukocytosis is likely steroid related  -await possible bronch and pending studies  -if she worsens can start cefepime and vanco                Thank you for your consult. I will sign off, call with questions    Helder Garcia MD  Infectious Disease  [facility]    Subjective:     Principal Problem:Acute on chronic diastolic heart failure    HPI: Patient is on high flow NC and not able to talk due to acuity of illness    Hx is from chart    "71 yo female with PMH of cryptogenic pneumonia on home oxygen, essential HTN, HLP, Type 2 diabetes, CKD stage 3, and known 3 vessel CAD was admitted to [facility] with acute hypoxic respiratory failure requiring initial hospitalization in ICU and intubated. Patient now extubated but still requiring intermittent BiPAP and oxygen for her respiratory failure. Attempts have been made to diuresis patient with IV lasix but resulted in worsening of renal function so have backed off diuresis and trying to use IV Diuril without much success. At this point due of not improvement in " "respiratory status request has been made for transfer to higher level of care and for Pulmonary evaluation due top patient's history of cytogenic pneumonia (BOOP) which responded to steroids in past to look for other possibilities for cause of continued respiratory issues besides just cardiac"    Sputum cx was negative at outside hospital and she was treated with linezolid, levaquin, and cefepime.     Here she has been placed on CTX.  Interval History: No events    Review of Systems   Unable to perform ROS: Acuity of condition     Objective:     Vital Signs (Most Recent):  Temp: 98.2 °F (36.8 °C) (10/16/17 1100)  Pulse: 97 (10/16/17 1200)  Resp: (!) 27 (10/16/17 1200)  BP: 128/73 (10/16/17 1200)  SpO2: 96 % (10/16/17 1200) Vital Signs (24h Range):  Temp:  [97.7 °F (36.5 °C)-98.4 °F (36.9 °C)] 98.2 °F (36.8 °C)  Pulse:  [] 97  Resp:  [16-44] 27  SpO2:  [93 %-100 %] 96 %  BP: (113-170)/(59-87) 128/73     Weight: 85.6 kg (188 lb 11.4 oz)  Body mass index is 31.4 kg/m².    Estimated Creatinine Clearance: 24.6 mL/min (based on SCr of 2.3 mg/dL (H)).    Physical Exam   Constitutional: She appears lethargic. No distress.   HENT:   Head: Normocephalic and atraumatic.   Eyes: Conjunctivae and EOM are normal.   Neck: Normal range of motion. Neck supple.   Cardiovascular: Normal rate, regular rhythm, normal heart sounds and intact distal pulses.    Pulmonary/Chest: No tachypnea. No respiratory distress. She has rales in the right upper field, the right middle field, the right lower field, the left upper field, the left middle field and the left lower field.   Abdominal: Soft. She exhibits no distension. There is no tenderness.   Musculoskeletal: She exhibits edema (mild DB LE). She exhibits no tenderness.   Neurological: She appears lethargic. She is disoriented.   Skin: She is not diaphoretic.       Significant Labs: All pertinent labs within the past 24 hours have been reviewed.    Significant Imaging: I have reviewed " all pertinent imaging results/findings within the past 24 hours.

## 2017-10-16 NOTE — SUBJECTIVE & OBJECTIVE
Interval History: No events    Review of Systems   Unable to perform ROS: Acuity of condition     Objective:     Vital Signs (Most Recent):  Temp: 98.2 °F (36.8 °C) (10/16/17 1100)  Pulse: 97 (10/16/17 1200)  Resp: (!) 27 (10/16/17 1200)  BP: 128/73 (10/16/17 1200)  SpO2: 96 % (10/16/17 1200) Vital Signs (24h Range):  Temp:  [97.7 °F (36.5 °C)-98.4 °F (36.9 °C)] 98.2 °F (36.8 °C)  Pulse:  [] 97  Resp:  [16-44] 27  SpO2:  [93 %-100 %] 96 %  BP: (113-170)/(59-87) 128/73     Weight: 85.6 kg (188 lb 11.4 oz)  Body mass index is 31.4 kg/m².    Estimated Creatinine Clearance: 24.6 mL/min (based on SCr of 2.3 mg/dL (H)).    Physical Exam   Constitutional: She appears lethargic. No distress.   HENT:   Head: Normocephalic and atraumatic.   Eyes: Conjunctivae and EOM are normal.   Neck: Normal range of motion. Neck supple.   Cardiovascular: Normal rate, regular rhythm, normal heart sounds and intact distal pulses.    Pulmonary/Chest: No tachypnea. No respiratory distress. She has rales in the right upper field, the right middle field, the right lower field, the left upper field, the left middle field and the left lower field.   Abdominal: Soft. She exhibits no distension. There is no tenderness.   Musculoskeletal: She exhibits edema (mild DB LE). She exhibits no tenderness.   Neurological: She appears lethargic. She is disoriented.   Skin: She is not diaphoretic.       Significant Labs: All pertinent labs within the past 24 hours have been reviewed.    Significant Imaging: I have reviewed all pertinent imaging results/findings within the past 24 hours.

## 2017-10-16 NOTE — PLAN OF CARE
Problem: Physical Therapy Goal  Goal: Physical Therapy Goal  Goals to be met by: 10/23/2017    Patient will increase functional independence with mobility by performin. Supine to sit with MInimal Assistance - not met  2. Sit to stand transfer with Moderate Assistance - not met  3. Gait  x 50 feet with Moderate Assistance using Rolling Walker. - not met  4. Sitting at edge of bed x8 minutes with Minimal Assistance - not met   5. Stand for 5 minutes with Minimal Assistance using Rolling Walker. - not met  6. Lower extremity exercise program x15 reps per handout, with independence      Michelle Shaffer PT, DPT  10/16/2017      Outcome: Ongoing (interventions implemented as appropriate)  Eval completed and goals appropriate.

## 2017-10-16 NOTE — PT/OT/SLP EVAL
Physical Therapy  Evaluation    Edie Hays   MRN: [id number]   Admitting Diagnosis: Acute on chronic diastolic heart failure    PT Received On: 10/16/17  PT Start Time: 1424     PT Stop Time: 1503    PT Total Time (min): 39 min       Billable Minutes:  Evaluation 10 and Therapeutic Activity 25    Diagnosis: Acute on chronic diastolic heart failure      Past Medical History:   Diagnosis Date    BOOP (bronchiolitis obliterans with organizing pneumonia) 5/2/2017    Overview:  Diagnosed clinically via response to steroids    Breast cancer     Chronic diastolic heart failure     Coronary artery disease involving native coronary artery of native heart without angina pectoris 5/30/2017    Encounter for blood transfusion     Essential hypertension 5/31/2017    HLD (hyperlipidemia) 5/31/2017    NSTEMI (non-ST elevated myocardial infarction) 5/31/2017    Type 2 diabetes mellitus with diabetic polyneuropathy, with long-term current use of insulin 5/31/2017    Last Assessment & Plan:  History of type 2 diabetes managed on basal bolus insulin at home.  She is currently receiving Lantus 35 units HS and Humalog 5 units with meals.  Blood glucose is stable ranging between 167-200.  We will continue to monitor as she is still receiving steroids making her glucose control difficult.This morning, patient had an accucheck documented at 60 which improved after o    Type 2 diabetes mellitus with stage 3 chronic kidney disease, with long-term current use of insulin 10/13/2017      Past Surgical History:   Procedure Laterality Date    MASTECTOMY         Referring physician: Frederic Preston   Date referred to PT: 10/16/2017    General Precautions: Standard, fall  Orthopedic Precautions: N/A   Braces: N/A       Do you have any cultural, spiritual, Confucianist conflicts, given your current situation?: none reported     Patient History:  Pt not understandable at times. History came from son. Pt's daughter visits daily and is able to assist  when needed. Family unable to care for pt at this functional level.   Lives With: parent(s)  Living Arrangements: house  Home Accessibility:  (ramp to enter);   Living Environment Comment: Pt lives with mother whom she cares for   in a H with a ramp to enter. Son reported that his sister visits and assists pt every day. Prior to admission, pt ambulating with rollator only short distances and required assist with some ADL's.  Equipment Currently Used at Home: bedside commode, rollator, oxygen, shower chair  DME owned (not currently used): none    Previous Level of Function:  Ambulation Skills: needs device and assist  Transfer Skills: needs device and assist    Subjective:  Communicated with RN prior to session and son present in room. Pt agreeable to session with encouragement from PT and son.     Chief Complaint: back pain   Patient goals: to return home     Pain/Comfort  Pain Rating 1: 7/10  Location - Orientation 1: generalized  Location 1: back  Pain Addressed 1: Reposition, Nurse notified  Pain Rating Post-Intervention 1: 7/10      Objective:   Patient found with: telemetry, pulse ox (continuous), blood pressure cuff, NG tube, trevizo catheter, oxygen     Cognitive Exam:  Oriented to: Person    Follows Commands/attention: Follows one-step commands  Communication: Pt rarely spoke in full sentences and was not understandable at times. Pt could answer some questions with one word answers at times.   Safety awareness/insight to disability: impaired    Physical Exam:  Lower Extremity Range of Motion:  Right Lower Extremity: full PROM; mod decrease in AROM 2nd to decreased strength  Left Lower Extremity: full PROM; mod decrease in AROM 2nd decreased strength     Lower Extremity Strength:  Right Lower Extremity: functionally 2+/5  Left Lower Extremity: functionally 2+/5    Gross motor coordination: WFL; B LE alternating to taps     Functional Mobility:  Bed Mobility:  Rolling/Turning to Left: Total  assistance  Rolling/Turning Right: Total assistance  Scooting/Bridging: Total Assistance  Supine to Sit: Total Assistance  Sit to Supine: Total Assistance    Transfers:  Sit <> Stand Assistance:  (N/t 2nd to impaired cognition )    Gait:   Gait Distance:  (N/T 2nd to impaired cognition )      Balance:   Static Sit: POOR: Needs MODERATE assist to maintain  Dynamic Sit: POOR: N/A    Therapeutic Activities and Exercises:  Sitting EOB fluctuating from min - max assist with multiple posterior and left LOB x 20 minutes. Pt required verbal cues and physical assist to correct LOB's.  Rolling to R and L to assist RN with changing sheets and amaya cleaning.       AM-PAC 6 CLICK MOBILITY  How much help from another person does this patient currently need?   1 = Unable, Total/Dependent Assistance  2 = A lot, Maximum/Moderate Assistance  3 = A little, Minimum/Contact Guard/Supervision  4 = None, Modified [place]/Independent    Turning over in bed (including adjusting bedclothes, sheets and blankets)?: 2  Sitting down on and standing up from a chair with arms (e.g., wheelchair, bedside commode, etc.): 1  Moving from lying on back to sitting on the side of the bed?: 2  Moving to and from a bed to a chair (including a wheelchair)?: 1  Need to walk in hospital room?: 1  Climbing 3-5 steps with a railing?: 1  Total Score: 8     AM-PAC Raw Score CMS G-Code Modifier Level of Impairment Assistance   6 % Total / Unable   7 - 9 CM 80 - 100% Maximal Assist   10 - 14 CL 60 - 80% Moderate Assist   15 - 19 CK 40 - 60% Moderate Assist   20 - 22 CJ 20 - 40% Minimal Assist   23 CI 1-20% SBA / CGA   24 CH 0% Independent/ Mod I     Patient left HOB elevated with all lines intact, call button in reach, RN notified and RN present.    Assessment:   Edie Hays is a 72 y.o. female with a medical diagnosis of Acute on chronic diastolic heart failure and presents with decreased strength, balance, endurance, ROM, transfers, and gait distance.  Pt with impaired cognition at this time with difficulty answering questions and forming complete sentences. Pt with fair tolerance to sitting EOB x 20 minutes, requiring min - max assist to correct posterior and right LOB's. Pt requires total assist for all bed mobility. Pt would benefit from skilled PT to address impairments and return to PLOF. Pt would also benefit from a SNF up d/c to increase strength/ endurance and ensure safety upon re-entering home with mother.     Rehab identified problem list/impairments: Rehab identified problem list/impairments: weakness, impaired endurance, impaired self care skills, impaired functional mobilty, gait instability, impaired balance, impaired cognition, pain, decreased ROM    Rehab potential is good.    Activity tolerance: Fair    Discharge recommendations: Discharge Facility/Level Of Care Needs: nursing facility, skilled     Barriers to discharge:      Equipment recommendations: Equipment Needed After Discharge: none (pt owns required equiptment )     GOALS:    Physical Therapy Goals        Problem: Physical Therapy Goal    Goal Priority Disciplines Outcome Goal Variances Interventions   Physical Therapy Goal     PT/OT, PT Ongoing (interventions implemented as appropriate)     Description:  Goals to be met by: 10/23/2017    Patient will increase functional independence with mobility by performin. Supine to sit with MInimal Assistance - not met  2. Sit to stand transfer with Moderate Assistance - not met  3. Gait  x 50 feet with Moderate Assistance using Rolling Walker. - not met  4. Sitting at edge of bed x8 minutes with Minimal Assistance - not met   5. Stand for 5 minutes with Minimal Assistance using Rolling Walker. - not met  6. Lower extremity exercise program x15 reps per handout, with independence      Michelle Shaffer, PT, DPT  10/16/2017                        PLAN:    Patient to be seen 5 x/week to address the above listed problems via gait training,  therapeutic activities, therapeutic exercises, neuromuscular re-education  Plan of Care expires: 11/16/17  Plan of Care reviewed with: patient, son          Michelle GALEN Shaffer, PT  10/16/2017

## 2017-10-16 NOTE — SUBJECTIVE & OBJECTIVE
Interval History: No acute events noted overnight.  Patient seen in AM responding to voice, but incoherent and non-purposeful responses.   at bedside, states that this is an improvement from the day prior.  Saturating 100% on 40% FiO2 and 20L, which was subsequently decreased to 10L.  Net negative 1.6 liters in last 24 hours.      Review of Systems   Unable to perform ROS: Mental status change     Objective:     Vital Signs (Most Recent):  Temp: 98.2 °F (36.8 °C) (10/16/17 1100)  Pulse: 97 (10/16/17 1200)  Resp: (!) 27 (10/16/17 1200)  BP: 128/73 (10/16/17 1200)  SpO2: 96 % (10/16/17 1200) Vital Signs (24h Range):  Temp:  [97.7 °F (36.5 °C)-98.4 °F (36.9 °C)] 98.2 °F (36.8 °C)  Pulse:  [] 97  Resp:  [16-44] 27  SpO2:  [93 %-100 %] 96 %  BP: (113-170)/(59-87) 128/73   Weight: 85.6 kg (188 lb 11.4 oz)  Body mass index is 31.4 kg/m².      Intake/Output Summary (Last 24 hours) at 10/16/17 1250  Last data filed at 10/16/17 1200   Gross per 24 hour   Intake           650.34 ml   Output             1720 ml   Net         -1069.66 ml       Physical Exam   Constitutional: She appears lethargic. No distress.   HENT:   Head: Normocephalic and atraumatic.   Eyes: Conjunctivae and EOM are normal.   Neck: Normal range of motion. Neck supple.   Cardiovascular: Normal rate, regular rhythm, normal heart sounds and intact distal pulses.    Pulmonary/Chest: No tachypnea. No respiratory distress. She has rales in the right upper field, the right middle field, the right lower field, the left upper field, the left middle field and the left lower field.   Abdominal: Soft. She exhibits no distension. There is no tenderness.   Musculoskeletal: She exhibits edema (mild DB LE). She exhibits no tenderness.   Neurological: She appears lethargic. She is disoriented.   Skin: She is not diaphoretic.       Vents:  Oxygen Concentration (%): 40 (10/16/17 1200)  Lines/Drains/Airways     Peripherally Inserted Central Catheter Line                  PICC Triple Lumen 10/14/17 left brachial 2 days          Drain                 Urethral Catheter -- days         NG/OG Tube 10/15/17 0207 nasogastric Left nostril 1 day              Significant Labs:    Recent Results (from the past 14 hour(s))   POCT glucose    Collection Time: 10/15/17 11:56 PM   Result Value Ref Range    POCT Glucose 185 (H) 70 - 110 mg/dL   Basic metabolic panel    Collection Time: 10/15/17 11:57 PM   Result Value Ref Range    Sodium 158 (H) 136 - 145 mmol/L    Potassium 4.5 3.5 - 5.1 mmol/L    Chloride 113 (H) 95 - 110 mmol/L    CO2 31 (H) 23 - 29 mmol/L    Glucose 177 (H) 70 - 110 mg/dL    BUN, Bld 70 (H) 8 - 23 mg/dL    Creatinine 2.1 (H) 0.5 - 1.4 mg/dL    Calcium 10.3 8.7 - 10.5 mg/dL    Anion Gap 14 8 - 16 mmol/L    eGFR if African American 26.9 (A) >60 mL/min/1.73 m^2    eGFR if non  23.3 (A) >60 mL/min/1.73 m^2   POCT glucose    Collection Time: 10/16/17  2:15 AM   Result Value Ref Range    POCT Glucose 220 (H) 70 - 110 mg/dL   Comprehensive metabolic panel    Collection Time: 10/16/17  3:28 AM   Result Value Ref Range    Sodium 155 (H) 136 - 145 mmol/L    Potassium 4.1 3.5 - 5.1 mmol/L    Chloride 111 (H) 95 - 110 mmol/L    CO2 30 (H) 23 - 29 mmol/L    Glucose 226 (H) 70 - 110 mg/dL    BUN, Bld 74 (H) 8 - 23 mg/dL    Creatinine 2.1 (H) 0.5 - 1.4 mg/dL    Calcium 9.8 8.7 - 10.5 mg/dL    Total Protein 7.9 6.0 - 8.4 g/dL    Albumin 3.0 (L) 3.5 - 5.2 g/dL    Total Bilirubin 0.9 0.1 - 1.0 mg/dL    Alkaline Phosphatase 291 (H) 55 - 135 U/L    AST 49 (H) 10 - 40 U/L    ALT 36 10 - 44 U/L    Anion Gap 14 8 - 16 mmol/L    eGFR if African American 26.9 (A) >60 mL/min/1.73 m^2    eGFR if non  23.3 (A) >60 mL/min/1.73 m^2   CBC auto differential    Collection Time: 10/16/17  3:28 AM   Result Value Ref Range    WBC 22.02 (H) 3.90 - 12.70 K/uL    RBC 3.92 (L) 4.00 - 5.40 M/uL    Hemoglobin 9.6 (L) 12.0 - 16.0 g/dL    Hematocrit 29.4 (L) 37.0 - 48.5 %    MCV 75  (L) 82 - 98 fL    MCH 24.5 (L) 27.0 - 31.0 pg    MCHC 32.7 32.0 - 36.0 g/dL    RDW 18.5 (H) 11.5 - 14.5 %    Platelets 185 150 - 350 K/uL    MPV 9.9 9.2 - 12.9 fL    Immature Granulocytes 1.6 (H) 0.0 - 0.5 %    Gran # 18.5 (H) 1.8 - 7.7 K/uL    Immature Grans (Abs) 0.35 (H) 0.00 - 0.04 K/uL    Lymph # 1.8 1.0 - 4.8 K/uL    Mono # 1.3 (H) 0.3 - 1.0 K/uL    Eos # 0.0 0.0 - 0.5 K/uL    Baso # 0.05 0.00 - 0.20 K/uL    nRBC 0 0 /100 WBC    Gran% 84.0 (H) 38.0 - 73.0 %    Lymph% 8.3 (L) 18.0 - 48.0 %    Mono% 5.9 4.0 - 15.0 %    Eosinophil% 0.0 0.0 - 8.0 %    Basophil% 0.2 0.0 - 1.9 %    Differential Method Automated    Magnesium    Collection Time: 10/16/17  3:28 AM   Result Value Ref Range    Magnesium 2.2 1.6 - 2.6 mg/dL   Phosphorus    Collection Time: 10/16/17  3:28 AM   Result Value Ref Range    Phosphorus 1.6 (L) 2.7 - 4.5 mg/dL   POCT glucose    Collection Time: 10/16/17  3:32 AM   Result Value Ref Range    POCT Glucose 254 (H) 70 - 110 mg/dL   POCT glucose    Collection Time: 10/16/17  4:31 AM   Result Value Ref Range    POCT Glucose 197 (H) 70 - 110 mg/dL   Basic metabolic panel    Collection Time: 10/16/17 10:15 AM   Result Value Ref Range    Sodium 157 (H) 136 - 145 mmol/L    Potassium 4.1 3.5 - 5.1 mmol/L    Chloride 112 (H) 95 - 110 mmol/L    CO2 32 (H) 23 - 29 mmol/L    Glucose 160 (H) 70 - 110 mg/dL    BUN, Bld 79 (H) 8 - 23 mg/dL    Creatinine 2.3 (H) 0.5 - 1.4 mg/dL    Calcium 10.2 8.7 - 10.5 mg/dL    Anion Gap 13 8 - 16 mmol/L    eGFR if African American 24.1 (A) >60 mL/min/1.73 m^2    eGFR if non African American 20.9 (A) >60 mL/min/1.73 m^2       Significant Imaging: I have reviewed all pertinent imaging results/findings within the past 24 hours.

## 2017-10-17 NOTE — PLAN OF CARE
Problem: Physical Therapy Goal  Goal: Physical Therapy Goal  Goals to be met by: 10/23/2017    Patient will increase functional independence with mobility by performin. Supine to sit with MInimal Assistance - not met  2. Sit to stand transfer with Moderate Assistance - not met  3. Gait  x 50 feet with Moderate Assistance using Rolling Walker. - not met  4. Sitting at edge of bed x8 minutes with Minimal Assistance - not met   5. Stand for 5 minutes with Minimal Assistance using Rolling Walker. - not met  6. Lower extremity exercise program x15 reps per handout, with independence - not met    Michelle Shaffer, PT, DPT  10/16/2017       Outcome: Ongoing (interventions implemented as appropriate)  Treatment completed. No goals met. Goals appropriate.

## 2017-10-17 NOTE — ASSESSMENT & PLAN NOTE
- Na in AM corrected to 153  - Placed on free water boluses and D5 overnight, hyperglycemic  - will re-check BMP and beta hydroxy given gap, will d/c D5  - continue free water bolusses

## 2017-10-17 NOTE — ASSESSMENT & PLAN NOTE
- Per [facility], [facility] 05/2017 w/ >70% stenosis to LCx, LAD, RCA, CABG deferred 2/2 Pulm HTN  - will likely need CABG in future, but may need to hold off for now given acute illness  - overall, situation difficult, as we do not have a known reason for hypoxia.  Likely primary lung issue causing elevated PASP.  May need lung biopsy as well in future

## 2017-10-17 NOTE — PLAN OF CARE
Problem: SLP Goal  Goal: SLP Goal   Pt seen for clinical swallow evaluation at the bedside. Pt with overt clinical signs of aspiration with all PO trials. Pt to remain strictly NPO and to continue alternate means of nutrition/hydration/medication via NG tube. Discussed with team who was in agreement with plan. Speech to continue to follow.     Danyell Eldridge MS, CCC-SLP  Speech Language Pathologist  Pager: [phone]  Date 10/17/2017

## 2017-10-17 NOTE — PLAN OF CARE
Problem: Patient Care Overview  Goal: Plan of Care Review  Outcome: Ongoing (interventions implemented as appropriate)  Insulin gtt off today. O2 weaned to 1L. Urine retention noted. Peoples inserted. Pt cortrak placement was confirmed ok to use per Dr. Reynolds. Will start tube feeding shortly. Pt son has been to bedside all day and updated on plan of care. All questions answered.

## 2017-10-17 NOTE — ASSESSMENT & PLAN NOTE
- patient presenting hypoxic, tachypneic, and was intiially on BiPAP  - given circumstance, possible that patient is fluid overloaded under setting of increased pulmonary hypertension 2/2 to underlying lung disease  -CxR with severe DB infiltrates  -BNP >1900, trop mildly elevated 2/2 demand but improving.  Repeat 2D echo WNL, no significant decrease in EF, noted diastolic dysfunction, or WMA   -ID consulted, recommended continuation of rocephin for now  -patient on nasal cannula 100% on 2 LNC, will stop diuresis as patient's BUN/Cr increasing (91/2.6)  -Mg>2, K>4 goals  -Strict I/O

## 2017-10-17 NOTE — ASSESSMENT & PLAN NOTE
- microcytic anemia, appears ACD  - hemoglobin stable at 9.6 with no reason/evidence for bleeding, continue daily CBCs

## 2017-10-17 NOTE — SUBJECTIVE & OBJECTIVE
Interval History: no overnight events    Review of Systems  Objective:     Vital Signs (Most Recent):  Temp: 98.1 °F (36.7 °C) (10/17/17 1500)  Pulse: 94 (10/17/17 1607)  Resp: (!) 23 (10/17/17 1607)  BP: (!) 124/56 (10/17/17 1500)  SpO2: 97 % (10/17/17 1607) Vital Signs (24h Range):  Temp:  [97.4 °F (36.3 °C)-98.2 °F (36.8 °C)] 98.1 °F (36.7 °C)  Pulse:  [] 94  Resp:  [13-33] 23  SpO2:  [92 %-100 %] 97 %  BP: ()/() 124/56     Weight: 85.5 kg (188 lb 7.9 oz)  Body mass index is 31.37 kg/m².    Estimated Creatinine Clearance: 22.6 mL/min (based on SCr of 2.5 mg/dL (H)).    Physical Exam   Constitutional: No distress.   Cardiovascular: Normal rate and regular rhythm.    No murmur heard.  Pulmonary/Chest: No respiratory distress. She has rales.   Abdominal: Soft. She exhibits no distension. There is no tenderness. There is no rebound.   Musculoskeletal: She exhibits no edema.   Lymphadenopathy:     She has no cervical adenopathy.   Neurological: She is alert. She is not disoriented.   Skin: No rash noted.       Significant Labs:   Blood Culture:   Recent Labs  Lab 10/14/17  0018 10/14/17  0511   LABBLOO No Growth to date  No Growth to date  No Growth to date  No Growth to date No Growth to date  No Growth to date  No Growth to date  No Growth to date     BMP:   Recent Labs  Lab 10/16/17  1600  10/17/17  1216   *  < > 126*   *  < > 152*   K 3.8  < > 3.2*   *  < > 108   CO2 32*  < > 27   BUN 86*  < > 95*   CREATININE 2.4*  < > 2.5*   CALCIUM 10.4  < > 9.6   MG 2.7*  --   --    < > = values in this interval not displayed.  CBC:   Recent Labs  Lab 10/16/17  0328 10/17/17  0636   WBC 22.02* 21.80*   HGB 9.6* 8.7*   HCT 29.4* 28.1*    199     CMP:   Recent Labs  Lab 10/16/17  0328  10/17/17  0234 10/17/17  0800 10/17/17  1216   *  < > 146* 145 152*   K 4.1  < > 4.0 3.6 3.2*   *  < > 102 102 108   CO2 30*  < > 26 29 27   *  < > 549* 558* 126*   BUN 74*  < >  91* 92* 95*   CREATININE 2.1*  < > 2.6* 2.5* 2.5*   CALCIUM 9.8  < > 9.4 9.3 9.6   PROT 7.9  --  7.3  --   --    ALBUMIN 3.0*  --  3.0*  --   --    BILITOT 0.9  --  0.7  --   --    ALKPHOS 291*  --  290*  --   --    AST 49*  --  73*  --   --    ALT 36  --  52*  --   --    ANIONGAP 14  < > 18* 14 17*   EGFRNONAA 23.3*  < > 18.0* 18.9* 18.9*   < > = values in this interval not displayed.    Significant Imaging: I have reviewed all pertinent imaging results/findings within the past 24 hours.

## 2017-10-17 NOTE — PT/OT/SLP PROGRESS
Physical Therapy  Treatment    Edie Hays   MRN: [id number]   Admitting Diagnosis: Acute on chronic diastolic heart failure    PT Received On: 10/17/17  PT Start Time: 0847     PT Stop Time: 0907    PT Total Time (min): 20 min       Billable Minutes:  Therapeutic Activity 20  Co-treat with OT    Treatment Type: Treatment  PT/PTA: PT             General Precautions: Standard, fall  Orthopedic Precautions: N/A   Braces: N/A    Do you have any cultural, spiritual, Sabianism conflicts, given your current situation?: none reported     Subjective:  Communicated with RN prior to session and son present in room. Pt agreeable to session.       Pain/Comfort  Pain Rating 1: 0/10  Pain Rating Post-Intervention 1: 0/10    Objective:   Patient found with: telemetry, pulse ox (continuous), blood pressure cuff, restraints, oxygen, PICC line, NG tube    Functional Mobility:  Bed Mobility:   Supine to Sit: Maximum Assistance with trunk and B LE's  Sit to Supine: Total Assistance    Transfers:  Sit <> Stand Assistance: Maximum Assistance (x2) x 1 trial; requiring physical assist at the hips for upright posture; pt tolerated standing for ~15 seconds   Sit <> Stand Assistive Device: No Assistive Device    Balance:   Static Sit: POOR: Needs MODERATE assist to maintain  Dynamic Sit: POOR: N/A  Static Stand: 0: Needs MAXIMAL assist to maintain     Therapeutic Activities and Exercises:  PT educated pt on incr OOB activity including sitting EOB with RN or therapy. Pt verbalized agreement.     Sitting EOB x 10 minutes fluctuating from supervision to mod assist to maintain static sitting in midline. Pt with frequent L LOB's requiring assist to correct.        AM-PAC 6 CLICK MOBILITY  How much help from another person does this patient currently need?   1 = Unable, Total/Dependent Assistance  2 = A lot, Maximum/Moderate Assistance  3 = A little, Minimum/Contact Guard/Supervision  4 = None, Modified [place]/Independent    Turning over in bed  (including adjusting bedclothes, sheets and blankets)?: 2  Sitting down on and standing up from a chair with arms (e.g., wheelchair, bedside commode, etc.): 2  Moving from lying on back to sitting on the side of the bed?: 2  Moving to and from a bed to a chair (including a wheelchair)?: 1  Need to walk in hospital room?: 1  Climbing 3-5 steps with a railing?: 1  Total Score: 9    AM-PAC Raw Score CMS G-Code Modifier Level of Impairment Assistance   6 % Total / Unable   7 - 9 CM 80 - 100% Maximal Assist   10 - 14 CL 60 - 80% Moderate Assist   15 - 19 CK 40 - 60% Moderate Assist   20 - 22 CJ 20 - 40% Minimal Assist   23 CI 1-20% SBA / CGA   24 CH 0% Independent/ Mod I     Patient left HOB elevated with all lines intact, call button in reach, RN notified and son present.    Assessment:  Edie Hays is a 72 y.o. female with a medical diagnosis of Acute on chronic diastolic heart failure and presents with impairments listed below. Pt progressing towards goals, but not at PLOF. Pt tolerated session well with no increase in pain/discomfort.  Pt is improving with therapy evidenced by decreased assist with supine to sit, decreased assist maintaining static sitting at times, and ability to stand for the first time in ICU with max A. Pt would benefit from continued PT services to improve overall functional mobility. Recommend d/c to Skilled nursing facility to maximize functional independence.      Rehab identified problem list/impairments: Rehab identified problem list/impairments: weakness, impaired endurance, impaired self care skills, impaired functional mobilty, gait instability, impaired balance    Rehab potential is good.    Activity tolerance: Good    Discharge recommendations: Discharge Facility/Level Of Care Needs: nursing facility, skilled     Barriers to discharge:      Equipment recommendations: Equipment Needed After Discharge: none (pt owns required equiptment)     GOALS:    Physical Therapy Goals         Problem: Physical Therapy Goal    Goal Priority Disciplines Outcome Goal Variances Interventions   Physical Therapy Goal     PT/OT, PT Ongoing (interventions implemented as appropriate)     Description:  Goals to be met by: 10/23/2017    Patient will increase functional independence with mobility by performin. Supine to sit with MInimal Assistance - not met  2. Sit to stand transfer with Moderate Assistance - not met  3. Gait  x 50 feet with Moderate Assistance using Rolling Walker. - not met  4. Sitting at edge of bed x8 minutes with Minimal Assistance - not met   5. Stand for 5 minutes with Minimal Assistance using Rolling Walker. - not met  6. Lower extremity exercise program x15 reps per handout, with independence - not met    Michelle Shaffer, PT, DPT  10/16/2017                         PLAN:    Patient to be seen 5 x/week  to address the above listed problems via gait training, therapeutic activities, therapeutic exercises, neuromuscular re-education  Plan of Care expires: 17  Plan of Care reviewed with: jose hernandez         Michelle Shaffer, PT  10/17/2017

## 2017-10-17 NOTE — CARE UPDATE
Spoke with hospital medicine. Patient to be transferred to hospital medicine in the AM.    Brien Cote,   PGY2 Internal Medicine  Pager: [phone]

## 2017-10-17 NOTE — ASSESSMENT & PLAN NOTE
71 yo female with PMH of cryptogenic pneumonia on home oxygen, essential HTN, HLP, Type 2 diabetes, CKD stage 3, and known 3 vessel CAD on whom ID is consulted for possible infectious component to her resp failure. By reports her outside cxs were negative and had no real response to antibiotics. She is afebrile and has a leukocytosis but no left shift or bands; otherwise no changes in clinical condition since yesterday, no obvious source of infection.     -based on the above information it is fairly unlikely that infection has cause her worsening  - stop ceftriaxone and monitor off antibiotics  - leukocytosis most likely steroid related

## 2017-10-17 NOTE — NURSING
Pt hasn't voided since this am. Bladder scan noted urine >788. MD notified. New order for trevizo noted.

## 2017-10-17 NOTE — PLAN OF CARE
SW completed LOCET and faxed PASRR to state.  Awtg 142.    SW went to pt's room to discuss dc plans.  The pt's son wasn't in the room.  SW spoke with pt who is agreeable with going to SNF, but would like her son to look at list and make decisions.    SW left a SNF list with SW contact info and will f/u.    Bernarda Martell LCSW     [phone]  Critical Care ([facility])

## 2017-10-17 NOTE — ASSESSMENT & PLAN NOTE
-Home regimen of aspart 15 AC and glargine 50 qd  -Currently BS >400s without acidosis  - worsening hyperglycemia likely 2/2 due to IV steroids.  Blood glucose elevated in AM given D5 gtt for hypernatremia, will D/C D5 at this point and continue free water flushes.  Continue insulin gtt as well.  Will order beta-hydroxy given elevated gap, also recheck BMP.  Will treat accordingly

## 2017-10-17 NOTE — PT/OT/SLP EVAL
Occupational Therapy  Evaluation    Edie Hays   MRN: [id number]   Admitting Diagnosis: Acute on chronic diastolic heart failure    OT Date of Treatment: 10/17/17   OT Start Time: 0847  OT Stop Time: 0910  OT Total Time (min): 23 min    Billable Minutes:  Evaluation 10  Self Care/Home Management 13    Diagnosis: Acute on chronic diastolic heart failure     Past Medical History:   Diagnosis Date    BOOP (bronchiolitis obliterans with organizing pneumonia) 5/2/2017    Overview:  Diagnosed clinically via response to steroids    Breast cancer     Chronic diastolic heart failure     Coronary artery disease involving native coronary artery of native heart without angina pectoris 5/30/2017    Encounter for blood transfusion     Essential hypertension 5/31/2017    HLD (hyperlipidemia) 5/31/2017    NSTEMI (non-ST elevated myocardial infarction) 5/31/2017    Type 2 diabetes mellitus with diabetic polyneuropathy, with long-term current use of insulin 5/31/2017    Last Assessment & Plan:  History of type 2 diabetes managed on basal bolus insulin at home.  She is currently receiving Lantus 35 units HS and Humalog 5 units with meals.  Blood glucose is stable ranging between 167-200.  We will continue to monitor as she is still receiving steroids making her glucose control difficult.This morning, patient had an accucheck documented at 60 which improved after o    Type 2 diabetes mellitus with stage 3 chronic kidney disease, with long-term current use of insulin 10/13/2017      Past Surgical History:   Procedure Laterality Date    MASTECTOMY         Referring physician: Mohan COLLINS)   Date referred to OT: 10/16/2017    General Precautions: Standard, fall     Patient History:  Living Environment  Lives With:  (Per patient's son. Patient lives with her mother. Pt's daughter checks on them daily and able to assist )  Living Arrangements: house (Single story home with ramp access )  Transportation Available: car, family or  "friend will provide  Living Environment Comment: Pt lives with her mother. Son reports that they both help each other at home. Pt's daughter able to assist upon d/c. Prior to admission son reports patient was indep with ADL/Self care, pt walked household distance with rollator. Per son patient was mostly a household ambulator prior to admission   Equipment Currently Used at Home: bedside commode, rollator, oxygen, shower chair    Prior level of function:   Bed Mobility/Transfers: independent  Grooming: independent  Bathing: independent  Upper Body Dressing: independent  Lower Body Dressing: independent  Toileting: independent  Home Management Skills: independent        Dominant hand: right    Subjective:  Communicated with RN prior to session.  Chief Complaint: shortness of breath/ weakness   Patient/Family stated goals: TO get better and go home     Pain/Comfort  Pain Rating 1: 0/10    Objective:  Patient found reclined in bed with: blood pressure cuff, PICC line, NG tube, restraints, oxygen, telemetry, pulse ox (continuous). Son at bedside. Pt unable to provide history secondary to confusion. Son at bedside able to provide info on PLOF    Cognitive Exam:   Oriented to: Person and grossly to place "hospital"   Follows Commands/attention: Follows one-step commands  Communication: clear/fluent  Memory:  Impaired STM and Impaired LTM  Safety awareness/insight to disability: impaired  Coping skills/emotional control: Appropriate to situation    Visual/perceptual:  Intact    Physical Exam:  Postural examination/scapula alignment: Rounded shoulder, Head forward and Kyphosis  Skin integrity: Visible skin intact  Edema: None noted     Sensation:   Intact    Upper Extremity Range of Motion:  Right Upper Extremity: grossly WFL  Left Upper Extremity: grossly WFL    Upper Extremity Strength:  Right Upper Extremity: 3+/5 thorughout  Left Upper Extremity: 3+/5 thorughout    Strength: Bilateral  3+/5    Fine motor " "coordination:   Intact  Left hand, finger to nose, Right hand, finger to nose, Left hand thumb/finger opposition skills, Right hand thumb/finger opposition skills, Left hand, manipulation of objects and Right hand, manipulation of objects    Gross motor coordination: WFL    Functional Mobility:  Bed Mobility:  Scooting/Bridging: Maximum Assistance (to EOB )  Supine to Sit: Maximum Assistance  Sit to Supine: Total Assistance    Transfers:  Sit <> Stand Assistance: Maximum Assistance (with handheld assist )    Functional Ambulation: activity did not occur    Activities of Daily Living:    LE Dressing Level of Assistance: Total assistance  Grooming Position: Seated  Grooming Level of Assistance: Contact guard assistance    Balance:   Static Sit: FAIR:  Dynamic Sit: FAIR:   Static Stand: POOR+:  Dynamic stand: POOR:     Therapeutic Activities and Exercises:  Bed mobility, working on sitting tolerance and sitting balance at EOB. Pt tolerated sitting at EOB for 10 min with Min to Mod assist. Sit to stand- noted to have increased knee flexion and trunk flexion in standing     AM-PAC 6 CLICK ADL  How much help from another person does this patient currently need?  1 = Unable, Total/Dependent Assistance  2 = A lot, Maximum/Moderate Assistance  3 = A little, Minimum/Contact Guard/Supervision  4 = None, Modified [place]/Independent    Putting on and taking off regular lower body clothing? : 1  Bathing (including washing, rinsing, drying)?: 1  Toileting, which includes using toilet, bedpan, or urinal? : 1  Putting on and taking off regular upper body clothing?: 2  Taking care of personal grooming such as brushing teeth?: 3  Eating meals?: 3  Total Score: 11    AM-PAC Raw Score CMS "G-Code Modifier Level of Impairment Assistance   6 % Total / Unable   7 - 9 CM 80 - 100% Maximal Assist   10-14 CL 60 - 80% Moderate Assist   15 - 19 CK 40 - 60% Moderate Assist   20 - 22 CJ 20 - 40% Minimal Assist   23 CI 1-20% SBA / CGA "   24 CH 0% Independent/ Mod I       Patient left supine with all lines intact, call button in reach, RN notified and son present    Assessment:  Edie Hays is a 72 y.o. female with a medical diagnosis of Acute on chronic diastolic heart failure and presents with decreased activity tolerance and shortness of breath impacting indep and safety with ADL/Self care and functional mobility. Pt will benefit from skilled OT services to maximize independence and safety with ADL/Self care and functional mobility   Recommend SNF as the next level of care     Rehab identified problem list/impairments: Rehab identified problem list/impairments: weakness, impaired endurance, impaired self care skills, impaired functional mobilty, gait instability, impaired balance, impaired cognition, impaired cardiopulmonary response to activity    Rehab potential is good.    Activity tolerance: Good    Discharge recommendations: Discharge Facility/Level Of Care Needs: nursing facility, skilled     Barriers to discharge: Barriers to Discharge: Decreased caregiver support (given current functional status )    Equipment recommendations:  (will defer to next level of care)     GOALS:    Occupational Therapy Goals        Problem: Occupational Therapy Goal    Goal Priority Disciplines Outcome Interventions   Occupational Therapy Goal     OT, PT/OT Ongoing (interventions implemented as appropriate)    Description:  Goals to be met by: 11/08/2017    Patient will increase functional independence with ADLs by performing:    UE Dressing with Supervision.  LE Dressing with Minimal Assistance.  Grooming while seated with Supervision.  Toileting from bedside commode with Minimal Assistance for hygiene and clothing management.   Sitting at edge of bed or bedside chair  x30 minutes with Supervision.  Rolling to Bilateral with Modified [place].   Supine to sit with Supervision.  Stand pivot transfers with Supervision.  Toilet transfer to bedside commode  with Supervision.                      PLAN:  Patient to be seen 4 x/week to address the above listed problems via self-care/home management, therapeutic activities, therapeutic exercises  Plan of Care expires: 11/07/17  Plan of Care reviewed with: patient, son         Awilda RAYO Dylon, OT  10/17/2017

## 2017-10-17 NOTE — SUBJECTIVE & OBJECTIVE
Interval History: No acute events noted overnight.  Weaned to nasal cannula yesterday.  Patient seen in AM, feeling much improved.  Diuresed 1.4 L yesterday, net positive 2/2 to free water boluses and D5 infusion.  Conversing in the AM, states feeling well.  Otherwise, blood glucose in AM elevated, ordering BMP and beta-hydroxybutyrate.      Review of Systems   Constitutional: Negative for fatigue and fever.   Respiratory: Negative for cough, shortness of breath and wheezing.    Cardiovascular: Negative for chest pain, palpitations and leg swelling.   Gastrointestinal: Negative for abdominal distention and abdominal pain.   Genitourinary: Negative for flank pain.   Musculoskeletal: Negative for arthralgias and myalgias.   Skin: Negative for pallor and rash.   Neurological: Negative for weakness and headaches.     Objective:     Vital Signs (Most Recent):  Temp: 98.2 °F (36.8 °C) (10/17/17 0300)  Pulse: 97 (10/17/17 0808)  Resp: (!) 26 (10/17/17 0808)  BP: (!) 170/123 (10/17/17 0600)  SpO2: 100 % (10/17/17 0808) Vital Signs (24h Range):  Temp:  [97.9 °F (36.6 °C)-98.2 °F (36.8 °C)] 98.2 °F (36.8 °C)  Pulse:  [] 97  Resp:  [17-33] 26  SpO2:  [94 %-100 %] 100 %  BP: (126-170)/() 170/123   Weight: 85.5 kg (188 lb 7.9 oz)  Body mass index is 31.37 kg/m².      Intake/Output Summary (Last 24 hours) at 10/17/17 0816  Last data filed at 10/17/17 0600   Gross per 24 hour   Intake          3286.74 ml   Output             1325 ml   Net          1961.74 ml       Physical Exam   Constitutional: She appears lethargic. No distress.   HENT:   Head: Normocephalic and atraumatic.   Eyes: Conjunctivae and EOM are normal.   Neck: Normal range of motion. Neck supple.   Cardiovascular: Normal rate, regular rhythm, normal heart sounds and intact distal pulses.    Pulmonary/Chest: No tachypnea. No respiratory distress. She has rales in the right upper field, the right middle field, the right lower field, the left upper field,  the left middle field and the left lower field.   Abdominal: Soft. She exhibits no distension. There is no tenderness.   Musculoskeletal: She exhibits edema (mild DB LE). She exhibits no tenderness.   Neurological: She appears lethargic. She is disoriented.   Skin: She is not diaphoretic.       Vents:  Oxygen Concentration (%): 5 (10/17/17 0000)  Lines/Drains/Airways     Peripherally Inserted Central Catheter Line                 PICC Triple Lumen 10/14/17 left brachial 3 days          Drain                 NG/OG Tube 10/15/17 0207 nasogastric Left nostril 2 days              Significant Labs:    Recent Results (from the past 14 hour(s))   POCT glucose    Collection Time: 10/16/17  7:43 PM   Result Value Ref Range    POCT Glucose 122 (H) 70 - 110 mg/dL   POCT glucose    Collection Time: 10/16/17  8:31 PM   Result Value Ref Range    POCT Glucose 93 70 - 110 mg/dL   POCT glucose    Collection Time: 10/16/17  9:17 PM   Result Value Ref Range    POCT Glucose 173 (H) 70 - 110 mg/dL   POCT glucose    Collection Time: 10/16/17  9:53 PM   Result Value Ref Range    POCT Glucose 171 (H) 70 - 110 mg/dL   POCT glucose    Collection Time: 10/16/17 11:08 PM   Result Value Ref Range    POCT Glucose 342 (H) 70 - 110 mg/dL   POCT glucose    Collection Time: 10/16/17 11:32 PM   Result Value Ref Range    POCT Glucose 385 (H) 70 - 110 mg/dL   POCT glucose    Collection Time: 10/17/17 12:11 AM   Result Value Ref Range    POCT Glucose 408 (H) 70 - 110 mg/dL   POCT glucose    Collection Time: 10/17/17  1:00 AM   Result Value Ref Range    POCT Glucose 318 (H) 70 - 110 mg/dL   POCT glucose    Collection Time: 10/17/17  2:25 AM   Result Value Ref Range    POCT Glucose 437 (H) 70 - 110 mg/dL   Vancomycin, random    Collection Time: 10/17/17  2:34 AM   Result Value Ref Range    Vancomycin, Random 8.8 Not established ug/mL   Comprehensive metabolic panel    Collection Time: 10/17/17  2:34 AM   Result Value Ref Range    Sodium 146 (H) 136 - 145  mmol/L    Potassium 4.0 3.5 - 5.1 mmol/L    Chloride 102 95 - 110 mmol/L    CO2 26 23 - 29 mmol/L    Glucose 549 (HH) 70 - 110 mg/dL    BUN, Bld 91 (H) 8 - 23 mg/dL    Creatinine 2.6 (H) 0.5 - 1.4 mg/dL    Calcium 9.4 8.7 - 10.5 mg/dL    Total Protein 7.3 6.0 - 8.4 g/dL    Albumin 3.0 (L) 3.5 - 5.2 g/dL    Total Bilirubin 0.7 0.1 - 1.0 mg/dL    Alkaline Phosphatase 290 (H) 55 - 135 U/L    AST 73 (H) 10 - 40 U/L    ALT 52 (H) 10 - 44 U/L    Anion Gap 18 (H) 8 - 16 mmol/L    eGFR if African American 20.8 (A) >60 mL/min/1.73 m^2    eGFR if non  18.0 (A) >60 mL/min/1.73 m^2   Iron and TIBC    Collection Time: 10/17/17  2:34 AM   Result Value Ref Range    Iron 38 30 - 160 ug/dL    Transferrin 138 (L) 200 - 375 mg/dL    TIBC 204 (L) 250 - 450 ug/dL    Saturated Iron 19 (L) 20 - 50 %   Ferritin    Collection Time: 10/17/17  2:34 AM   Result Value Ref Range    Ferritin 1,334 (H) 20.0 - 300.0 ng/mL   POCT glucose    Collection Time: 10/17/17  3:19 AM   Result Value Ref Range    POCT Glucose 324 (H) 70 - 110 mg/dL   POCT glucose    Collection Time: 10/17/17  3:56 AM   Result Value Ref Range    POCT Glucose 413 (H) 70 - 110 mg/dL   POCT glucose    Collection Time: 10/17/17  5:35 AM   Result Value Ref Range    POCT Glucose 348 (H) 70 - 110 mg/dL   POCT glucose    Collection Time: 10/17/17  6:16 AM   Result Value Ref Range    POCT Glucose 299 (H) 70 - 110 mg/dL       Significant Imaging: I have reviewed all pertinent imaging results/findings within the past 24 hours.     No new imaging.

## 2017-10-17 NOTE — PT/OT/SLP EVAL
"Speech Language Pathology  Evaluation    Edie Hays   MRN: [id number]   Admitting Diagnosis: Acute on chronic diastolic heart failure    Diet recommendations: Solid Diet Level: NPO  Liquid Diet Level: NPO    SLP Treatment Date: 10/17/17  Speech Start Time: 0819     Speech Stop Time: 0833     Speech Total (min): 14 min       TREATMENT BILLABLE MINUTES:  Treatment Swallowing Dysfunction 14    Diagnosis: Acute on chronic diastolic heart failure      Past Medical History:   Diagnosis Date    BOOP (bronchiolitis obliterans with organizing pneumonia) 5/2/2017    Overview:  Diagnosed clinically via response to steroids    Breast cancer     Chronic diastolic heart failure     Coronary artery disease involving native coronary artery of native heart without angina pectoris 5/30/2017    Encounter for blood transfusion     Essential hypertension 5/31/2017    HLD (hyperlipidemia) 5/31/2017    NSTEMI (non-ST elevated myocardial infarction) 5/31/2017    Type 2 diabetes mellitus with diabetic polyneuropathy, with long-term current use of insulin 5/31/2017    Last Assessment & Plan:  History of type 2 diabetes managed on basal bolus insulin at home.  She is currently receiving Lantus 35 units HS and Humalog 5 units with meals.  Blood glucose is stable ranging between 167-200.  We will continue to monitor as she is still receiving steroids making her glucose control difficult.This morning, patient had an accucheck documented at 60 which improved after o    Type 2 diabetes mellitus with stage 3 chronic kidney disease, with long-term current use of insulin 10/13/2017     Past Surgical History:   Procedure Laterality Date    MASTECTOMY         Has the patient been evaluated by SLP for swallowing? : No  Keep patient NPO?: Yes   General Precautions: Standard, fall, aspiration    Current Respiratory Status: nasal cannula     Prior diet: regular solids and thin liquids       Subjective:  "I just need some " "water".    Pain/Comfort  Pain Rating 1: 0/10  Pain Rating Post-Intervention 1: 0/10    Objective:   Patient found with: telemetry, pulse ox (continuous), NG tube, oxygen, trevizo catheter    Oral Musculature Evaluation  Oral Musculature: general weakness  Dentition: teeth in poor condition, scattered dentition (no upper dentition )  Secretion Management:  (appears adequate)  Mucosal Quality: adequate  Oral Labial Strength and Mobility: WFL  Lingual Strength and Mobility: WFL  Volitional Cough: strong with SLP model  Volitional Swallow: delayed;  decreased hyolaryngeal rise to palpation   Voice Prior to PO Intake: mildly hoarse yet dry      Bedside Swallow Eval:  Consistencies Assessed: Thin liquids ice chips x1, teaspoon x3, single sip opn cup x1\, Nectar thick liquids teaspoon x3, single sip open cup x5 and Puree 1/2 tsp x2  Oral Phase: mildly prolonged oral prep and brief bolus holding prior to initiating A-P transfer   Pharyngeal Phase: coughing/choking immediately following ice chips and trials of thin liquids. Cough response appeared strong in nature. With remaining consistencies Pt with multiple spontaneous swallows 4+ per bolus, decreased hyolaryngeal rise to palpation and delayed throat clearing. Pt complaining of material remaining in her throat. Given Pt overall deconditioned stat and overt clinical signs of aspiration with trials this date Pt to remain strictly NPO. Pt not yet appearing ready for MBSS at this time.     Additional Treatment:  Pt with slow response time and appearing oriented to place and self. Pt following single step directions with min-moderate SLP cueing. Ongoing speech follow up warranted. Discussed ongoing NPO recs with MD.     Assessment:  Edie Hays is a 72 y.o. female with a medical diagnosis of Acute on chronic diastolic heart failure and presents with dysphagia.      Discharge recommendations: Discharge Facility/Level Of Care Needs: nursing facility, skilled     Goals:    SLP " Goals        Problem: SLP Goal    Goal Priority Disciplines Outcome   SLP Goal     SLP    Description:  Speech Language Pathology Goals  Goals expected to be met by    1. Pt will participate in ongoing assessment of swallow function   2. Pt will participate in dysphagia exercises x10 to enhance swallow function                      Plan:   Patient to be seen Therapy Frequency: 5 x/week   Plan of Care expires: 11/15/17  Plan of Care reviewed with: patient  SLP Follow-up?: Yes              Danyell Eldridge CCC-SLP  10/17/2017

## 2017-10-17 NOTE — PROGRESS NOTES
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 Initially admitted with septic shock 10/07 and intubated and transferred to ICU. Treated with zyvox, levoquin and cefepime. At that admission her sputum cx, UCx and BCx were negative. Per report attempts at diuresis prior to transfer including diuril and lasix were unsuccessful.     Initial cryptogenic PNA was diagnosed 05/2017 where she was discharged on a steroid taper starting at 60mg and currently on 20mg qd.     Overnight she had worsening lethargy per her . Placed on BiPAP overnight. She currently is arousable but confused and denies any pain, shortness of breath. Her  reports lethargy worsened after getting ativan at 3AM.     Interval History: No acute events noted overnight.  Weaned to nasal cannula yesterday.  Patient seen in AM, feeling much improved.  Diuresed 1.4 L yesterday, net positive 2/2 to free water boluses and D5 infusion.  Conversing in the AM, states feeling well.  Otherwise, blood glucose in AM elevated, ordering BMP and beta-hydroxybutyrate.      Review of Systems   Constitutional: Negative for fatigue and fever.   Respiratory: Negative for cough, shortness of breath and wheezing.    Cardiovascular: Negative for chest pain, palpitations and leg swelling.   Gastrointestinal: Negative for abdominal distention and abdominal pain.   Genitourinary: Negative for flank pain.   Musculoskeletal: Negative for arthralgias  and myalgias.   Skin: Negative for pallor and rash.   Neurological: Negative for weakness and headaches.     Objective:     Vital Signs (Most Recent):  Temp: 98.2 °F (36.8 °C) (10/17/17 0300)  Pulse: 97 (10/17/17 0808)  Resp: (!) 26 (10/17/17 0808)  BP: (!) 170/123 (10/17/17 0600)  SpO2: 100 % (10/17/17 0808) Vital Signs (24h Range):  Temp:  [97.9 °F (36.6 °C)-98.2 °F (36.8 °C)] 98.2 °F (36.8 °C)  Pulse:  [] 97  Resp:  [17-33] 26  SpO2:  [94 %-100 %] 100 %  BP: (126-170)/() 170/123   Weight: 85.5 kg (188 lb 7.9 oz)  Body mass index is 31.37 kg/m².      Intake/Output Summary (Last 24 hours) at 10/17/17 0816  Last data filed at 10/17/17 0600   Gross per 24 hour   Intake          3286.74 ml   Output             1325 ml   Net          1961.74 ml       Physical Exam   Constitutional: She appears lethargic. No distress.   HENT:   Head: Normocephalic and atraumatic.   Eyes: Conjunctivae and EOM are normal.   Neck: Normal range of motion. Neck supple.   Cardiovascular: Normal rate, regular rhythm, normal heart sounds and intact distal pulses.    Pulmonary/Chest: No tachypnea. No respiratory distress. She has rales in the right upper field, the right middle field, the right lower field, the left upper field, the left middle field and the left lower field.   Abdominal: Soft. She exhibits no distension. There is no tenderness.   Musculoskeletal: She exhibits edema (mild DB LE). She exhibits no tenderness.   Neurological: She appears lethargic. She is disoriented.   Skin: She is not diaphoretic.       Vents:  Oxygen Concentration (%): 5 (10/17/17 0000)  Lines/Drains/Airways     Peripherally Inserted Central Catheter Line                 PICC Triple Lumen 10/14/17 left brachial 3 days          Drain                 NG/OG Tube 10/15/17 0207 nasogastric Left nostril 2 days              Significant Labs:    Recent Results (from the past 14 hour(s))   POCT glucose    Collection Time: 10/16/17  7:43 PM   Result Value Ref  Range    POCT Glucose 122 (H) 70 - 110 mg/dL   POCT glucose    Collection Time: 10/16/17  8:31 PM   Result Value Ref Range    POCT Glucose 93 70 - 110 mg/dL   POCT glucose    Collection Time: 10/16/17  9:17 PM   Result Value Ref Range    POCT Glucose 173 (H) 70 - 110 mg/dL   POCT glucose    Collection Time: 10/16/17  9:53 PM   Result Value Ref Range    POCT Glucose 171 (H) 70 - 110 mg/dL   POCT glucose    Collection Time: 10/16/17 11:08 PM   Result Value Ref Range    POCT Glucose 342 (H) 70 - 110 mg/dL   POCT glucose    Collection Time: 10/16/17 11:32 PM   Result Value Ref Range    POCT Glucose 385 (H) 70 - 110 mg/dL   POCT glucose    Collection Time: 10/17/17 12:11 AM   Result Value Ref Range    POCT Glucose 408 (H) 70 - 110 mg/dL   POCT glucose    Collection Time: 10/17/17  1:00 AM   Result Value Ref Range    POCT Glucose 318 (H) 70 - 110 mg/dL   POCT glucose    Collection Time: 10/17/17  2:25 AM   Result Value Ref Range    POCT Glucose 437 (H) 70 - 110 mg/dL   Vancomycin, random    Collection Time: 10/17/17  2:34 AM   Result Value Ref Range    Vancomycin, Random 8.8 Not established ug/mL   Comprehensive metabolic panel    Collection Time: 10/17/17  2:34 AM   Result Value Ref Range    Sodium 146 (H) 136 - 145 mmol/L    Potassium 4.0 3.5 - 5.1 mmol/L    Chloride 102 95 - 110 mmol/L    CO2 26 23 - 29 mmol/L    Glucose 549 (HH) 70 - 110 mg/dL    BUN, Bld 91 (H) 8 - 23 mg/dL    Creatinine 2.6 (H) 0.5 - 1.4 mg/dL    Calcium 9.4 8.7 - 10.5 mg/dL    Total Protein 7.3 6.0 - 8.4 g/dL    Albumin 3.0 (L) 3.5 - 5.2 g/dL    Total Bilirubin 0.7 0.1 - 1.0 mg/dL    Alkaline Phosphatase 290 (H) 55 - 135 U/L    AST 73 (H) 10 - 40 U/L    ALT 52 (H) 10 - 44 U/L    Anion Gap 18 (H) 8 - 16 mmol/L    eGFR if African American 20.8 (A) >60 mL/min/1.73 m^2    eGFR if non  18.0 (A) >60 mL/min/1.73 m^2   Iron and TIBC    Collection Time: 10/17/17  2:34 AM   Result Value Ref Range    Iron 38 30 - 160 ug/dL    Transferrin 138  (L) 200 - 375 mg/dL    TIBC 204 (L) 250 - 450 ug/dL    Saturated Iron 19 (L) 20 - 50 %   Ferritin    Collection Time: 10/17/17  2:34 AM   Result Value Ref Range    Ferritin 1,334 (H) 20.0 - 300.0 ng/mL   POCT glucose    Collection Time: 10/17/17  3:19 AM   Result Value Ref Range    POCT Glucose 324 (H) 70 - 110 mg/dL   POCT glucose    Collection Time: 10/17/17  3:56 AM   Result Value Ref Range    POCT Glucose 413 (H) 70 - 110 mg/dL   POCT glucose    Collection Time: 10/17/17  5:35 AM   Result Value Ref Range    POCT Glucose 348 (H) 70 - 110 mg/dL   POCT glucose    Collection Time: 10/17/17  6:16 AM   Result Value Ref Range    POCT Glucose 299 (H) 70 - 110 mg/dL       Significant Imaging: I have reviewed all pertinent imaging results/findings within the past 24 hours.     No new imaging.      Assessment/Plan:     Neuro   Acute metabolic encephalopathy    - Likely 2/2 acute illness vs hypernatremia  - Improved at this point, continue to monitor           Pulmonary   Acute on chronic respiratory failure with hypoxia    - patient presenting hypoxic, tachypneic, and was intiially on BiPAP  - given circumstance, possible that patient is fluid overloaded under setting of increased pulmonary hypertension 2/2 to underlying lung disease  -CxR with severe DB infiltrates  -BNP >1900, trop mildly elevated 2/2 demand but improving.  Repeat 2D echo WNL, no significant decrease in EF, noted diastolic dysfunction, or WMA   -ID consulted, recommended continuation of rocephin for now  -patient on nasal cannula 100% on 2 LNC, will stop diuresis as patient's BUN/Cr increasing (91/2.6)  -Mg>2, K>4 goals  -Strict I/O        Cardiac/Vascular   * Acute on chronic diastolic heart failure    -See respiratory failure        HLD (hyperlipidemia)    -Continue home statin        Essential hypertension    -Continue home amlodipine  - BP WNL in last 24 hours         Coronary artery disease involving native coronary artery of native heart without  angina pectoris    - Per [facility] Schmidt, [facility] 05/2017 w/ >70% stenosis to LCx, LAD, RCA, CABG deferred 2/2 Pulm HTN  - will likely need CABG in future, but may need to hold off for now given acute illness  - overall, situation difficult, as we do not have a known reason for hypoxia.  Likely primary lung issue causing elevated PASP.  May need lung biopsy as well in future           Renal/   Hypernatremia    - Na in AM corrected to 153  - Placed on free water boluses and D5 overnight, hyperglycemic  - will re-check BMP and beta hydroxy given gap, will d/c D5  - continue free water bolusses        Oncology   Normocytic anemia    - microcytic anemia, appears ACD  - hemoglobin stable at 9.6 with no reason/evidence for bleeding, continue daily CBCs         Endocrine   Type 2 diabetes mellitus with hyperglycemia, with long-term current use of insulin    -Home regimen of aspart 15 AC and glargine 50 qd  -Currently BS >400s without acidosis  - worsening hyperglycemia likely 2/2 due to IV steroids.  Blood glucose elevated in AM given D5 gtt for hypernatremia, will D/C D5 at this point and continue free water flushes.  Continue insulin gtt as well.  Will order beta-hydroxy given elevated gap, also recheck BMP.  Will treat accordingly         Type 2 diabetes mellitus with stage 3 chronic kidney disease, with long-term current use of insulin    -See DM2 w/ hyperglycemia           Critical Care Daily Checklist:    A: Awake: RASS Goal/Actual Goal: RASS Goal: 0-->alert and calm  Actual: [place] Agitation Sedation Scale (RASS): Alert and calm   B: Spontaneous Breathing Trial Performed?     C: SAT & SBT Coordinated?  None needed                      D: Delirium: CAM-ICU Overall CAM-ICU: Negative   E: Early Mobility Performed? Yes   F: Feeding Goal:    Status:     Current Diet Order   Procedures    Diet NPO      AS: Analgesia/Sedation none   T: Thromboembolic Prophylaxis Heparin 5000 TID   H: HOB > 300 Yes   U: Stress Ulcer  Prophylaxis (if needed) protonix IV, transition to PO pending speech eval   G: Glucose Control Insulin gtt   B: Bowel Function Stool Occurrence: 1   I: Indwelling Catheter (Lines & Peoples) Necessity PIVs   D: De-escalation of Antimicrobials/Pharmacotherapies rocephin    Plan for the day/ETD Speech, insulin gtt, possible stepdown    Code Status:  Family/Goals of Care: Full Code         Critical secondary to Patient has a condition that poses threat to life and bodily function: Severe Respiratory Distress      Critical care was time spent personally by me on the following activities: development of treatment plan with patient or surrogate and bedside caregivers, discussions with consultants, evaluation of patient's response to treatment, examination of patient, ordering and performing treatments and interventions, ordering and review of laboratory studies, ordering and review of radiographic studies, pulse oximetry, re-evaluation of patient's condition. This critical care time did not overlap with that of any other provider or involve time for any procedures.     Salo Reynolds MD  Critical Care Medicine  [facility]

## 2017-10-17 NOTE — RESIDENT HANDOFF
Handoff     Primary Team: [facility] CRITICAL CARE MEDICINE Room Number: [id number] A     Patient Name: Edie Hays MRN: [id number]     Date of Birth: [id number] Allergies: Codeine; Lortab [hydrocodone-acetaminophen]; and Penicillins     Age: 72 y.o. Admit Date: 10/13/2017     Sex: female  BMI: Body mass index is 31.37 kg/m².     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 Initially admitted with septic shock 10/07 and intubated and transferred to ICU. Treated with zyvox, levoquin and cefepime. At that admission her sputum cx, UCx and BCx were negative. Per report attempts at diuresis prior to transfer including diuril and lasix were unsuccessful.      Initial cryptogenic PNA was diagnosed 05/2017 where she was discharged on a steroid taper starting at 60mg and currently on 20mg qd.      Overnight she had worsening lethargy per her . Placed on BiPAP overnight. She currently is arousable but confused and denies any pain, shortness of breath. Her  reports lethargy worsened after getting ativan at 3AM.       Hospital Course (updated, brief assessment by system or problem, significant events):     Patient admitted to CMICU on BiPAP.  Started on prednisone 60 mg daily, home dose 20.  Diuresed with lasix 80 mg IV TID.  Overall improvement of oxygen status, down to 4L NC.  Had issues with abdominal pain on 10/17, bladder scan revealing 700 cc residuals, improved pain with trevizo placement.  NG tube confirmed with KUB, initially a post-pyloric tube, awaiting read for placement.  Hypernatremic at 158, improving with free water boluses, currently 400  q 4.  Restarting once NG tube noted in gastric body.      Tasks (specific, using if-then statements):     Neuro   Acute metabolic encephalopathy     - Likely 2/2 acute illness vs hypernatremia  - Improved at this point, continue to monitor      Pulmonary   Acute on chronic respiratory failure with hypoxia     - patient presenting hypoxic, tachypneic, and was intiially on BiPAP  - given circumstance, possible that patient is fluid overloaded under setting of increased pulmonary hypertension 2/2 to underlying lung disease  -CxR with severe DB infiltrates  -BNP >1900, trop mildly elevated 2/2 demand but improving.  Repeat 2D echo WNL, no significant decrease in EF, noted diastolic dysfunction, or WMA   -ID consulted, recommended continuation of rocephin for now  -patient on nasal cannula 100% on 2 LNC, will stop diuresis as patient's BUN/Cr increasing (91/2.6)  -Mg>2, K>4 goals  -Strict I/O         Coronary artery disease involving native coronary artery of native heart without angina pectoris     - Per [facility] 05/2017 w/ >70% stenosis to LCx, LAD, RCA, CABG deferred 2/2 Pulm HTN  - will likely need CABG in future, but may need to hold off for now given acute illness  - overall, situation difficult, as we do not have a known reason for hypoxia.  Likely primary lung issue causing elevated PASP.  May need lung biopsy as well in future            Renal/   Hypernatremia     - Na in AM corrected to 153  - Placed on free water boluses and D5 overnight, hyperglycemic  - will re-check BMP and beta hydroxy given gap, will d/c D5  - continue free water bolusses         Endocrine   Type 2 diabetes mellitus with hyperglycemia, with long-term current use of insulin     -Home regimen of aspart 15 AC and glargine 50 qd  - switching to levemir 20 BID with LDSSI q 4 hours while on tube feeds          Goals:    1. Wean O2    2. Wean steroids    3.  NPO for now, tube feeds ordered.  Speech following.    4. Wean insulin gtt  to SubQ, monitor     5.  PT/OT    6. Establish outpatient CABG given 3 vessel disease

## 2017-10-17 NOTE — PLAN OF CARE
Problem: Occupational Therapy Goal  Goal: Occupational Therapy Goal  Goals to be met by: 11/08/2017    Patient will increase functional independence with ADLs by performing:    UE Dressing with Supervision.  LE Dressing with Minimal Assistance.  Grooming while seated with Supervision.  Toileting from bedside commode with Minimal Assistance for hygiene and clothing management.   Sitting at edge of bed or bedside chair  x30 minutes with Supervision.  Rolling to Bilateral with Modified [place].   Supine to sit with Supervision.  Stand pivot transfers with Supervision.  Toilet transfer to bedside commode with Supervision.    Outcome: Ongoing (interventions implemented as appropriate)  OT evaluation and Plan of care established 10/17/2017

## 2017-10-17 NOTE — PLAN OF CARE
Problem: Patient Care Overview  Goal: Plan of Care Review  Outcome: Ongoing (interventions implemented as appropriate)  See vital signs and assessments in flowsheets. See below for updates on today's progress.     Pulmonary: on nasal cannula at 2l/ min, sats at %, no SOB or dyspnea    Cardiovascular:SR-St; 90s to 110s, no PVCs, no ectopies, SBp: 140s-160s, MAP > 65, no complaints of chest pain    Neurological: Oriented x 4, still on restraints, Afebrile    Gastrointestinal: LBM was 10/16, NPO    Genitourinary: straight cath on 2am, 850cc Urine Output    Endocrine: Accucheck q1H, Latest accucheck is 299    Infusions: Insulin/D5W    Plan of care communicated and reviewed with Edie Hays and family. All concerns addressed. Will continue to monitor.

## 2017-10-17 NOTE — NURSING
This am it was noted that the cortrak was 83 at the nose, clamped at 85. It was documented 92 at time of insertion. No one on the unit is certified for tube placement/advancement. MD was notified. KUB was ordered. Dr. Reynolds spoke with radiologist concerning tube placement and the desired location for the tube is stomach. Dr. Reynolds ordered for the tube to be pulled back 15cm. It is now clamped at 69. 68cm at the nose. KUB for placement pending. Pt unable to take PO medication at this time. MD aware pt has not had morning medications nor scheduled PO fluid boluses. Will con't to monitor.

## 2017-10-18 PROBLEM — G93.41 ACUTE METABOLIC ENCEPHALOPATHY: Status: RESOLVED | Noted: 2017-01-01 | Resolved: 2017-01-01

## 2017-10-18 NOTE — PLAN OF CARE
Rounded with IM3. Patient's family at bedside. SNF preference is for [facility] in [place]. JEFFREY Menchaca updated. Will follow.

## 2017-10-18 NOTE — NURSING
Patient is alert.  NGT was removed by patient per night nurse report.  All morning medications held and patient is NPO.  Patient failed dysphagia screen. NPO until speech consult.  Started insulin drip at 2.5 Units infusing at 2.5 ml/hr. Blood sugar = 397.  Son at bedside.  VSS.  No complaints of pain or SOB.  Patient has limb alerts bilaterally (left arm PICC placed, right arm breast surgery per son).  EVAN Nevarez. notified.

## 2017-10-18 NOTE — ASSESSMENT & PLAN NOTE
Elevated today. Patient just transitioned to oral today and requires nectar thick liquids. Na may increase but holding off on NG tube for free water boluses due to recent transition back on PO. Holding D5 due to hyperglycemia. Will monitor with BMP tonight and repeat in AM.

## 2017-10-18 NOTE — PROGRESS NOTES
Paged Dr. Olga Md of elevated BG at 378 and whether to hold tube feedings and medications. Ordered to give PO medications. Ordered to give 5 units of novolog for blood sugar. Also ordered phenergan for nausea. Will administer and continue to monitor. Ordered to resume tube feedings when nausea subsides. Will continue to monitor.

## 2017-10-18 NOTE — PROGRESS NOTES
[facility] Hospital Medicine  Progress Note    Patient Name: Edie Hays  MRN: [id number]  Patient Class: IP- Inpatient   Admission Date: 10/13/2017  Length of Stay: 5 days  Attending Physician: Daisha Pantoja MD  Primary Care Provider: Primary Doctor [facility] Medicine Team: [facility] HOSP MED 3 Micheal Nevarez MD    Subjective:     Principal Problem:Acute on chronic diastolic heart failure    HPI:  73 y/o female with past medical history of cryptogenic pneumonia (home oxygen; respondent to steroids), HTN, HLP, Type 2 diabetes (on insulin) with polyneuropathy and CKD stage 3 and CAD was admitted to [facility] with acute hypoxic respiratory failure requiring initial hospitalization in ICU and intubated.  Patient has been transferred to [facility] for evaluation of acute hypoxic respiratory failure and pulmonary consult.     Patients cytogenic PNA was first diagnosed in 5/2017 at [facility]. Patient was placed on a steroid taper from 60mg to 30mg daily, continue taper 10mg every 3 days, 20mg daily.    Patient was originally admitted to [facility] on 10/7/17 with septic shock. Patient required intubation with ICU level care. She was treated with zyvox, levoquin and maxipime.  She was extubated and required intermittent BiPAP and oxygen for her respiratory failure. Cardiology had been following the patient at [facility].  Sputum culture with normal growth. Urine culture negative.  BCx X2 were NGTD. Attempts have been made to diuresis patient with IV lasix but resulted in worsening of renal function so have backed off diuresis and trying to use IV Diuril without much success. Due to this, patient was transferred to [facility] for higher level of care and Pulmonary.    Of note, patient has 3 vessel CAD with negative PET stress for perfusion defects done here at [facility] so CABG was not indicated     Interval History:  72 F with cryptogenic PNA ICU stepdown s/p intubation and  recovering from septic shock. Has been on zyvox, levaquin, cefepime, rocephin and s/p aggressive IV diuresis. On Prednisone 60mg BID and is chronically on 20mg daily for chronic lung disease.   Overnight patient was nauseated and pulled out NG tube.   This morning patient has no complaints. Nausea resolved. Son feels her mental status is at baseline and patient is very aware of surroudings.     Review of Systems   Constitutional: Negative for chills and fever.   HENT: Negative for congestion and sore throat.    Eyes: Negative for photophobia and visual disturbance.   Respiratory: Positive for shortness of breath (Improved). Negative for cough.    Cardiovascular: Negative for chest pain, palpitations and leg swelling.   Gastrointestinal: Negative for abdominal pain, constipation, diarrhea, nausea and vomiting.   Genitourinary:        Catheterized   Musculoskeletal: Negative for arthralgias and myalgias.   Skin: Negative for rash and wound.   Neurological: Negative for syncope and headaches.   Psychiatric/Behavioral: Negative for agitation and confusion.     Objective:     Vital Signs (Most Recent):  Temp: 97.4 °F (36.3 °C) (10/18/17 1200)  Pulse: (!) 115 (10/18/17 1215)  Resp: 17 (10/18/17 1215)  BP: 138/83 (10/18/17 1200)  SpO2: 95 % (10/18/17 1215) Vital Signs (24h Range):  Temp:  [97.3 °F (36.3 °C)-98.2 °F (36.8 °C)] 97.4 °F (36.3 °C)  Pulse:  [] 115  Resp:  [16-30] 17  SpO2:  [92 %-99 %] 95 %  BP: (117-138)/(56-83) 138/83     Weight: 85.5 kg (188 lb 7.9 oz)  Body mass index is 31.37 kg/m².    Physical Exam   Constitutional: She is oriented to person, place, and time. No distress.   Thin, elderly female. Somewhat somnolent.    HENT:   Head: Normocephalic and atraumatic.   No NG tube in place   Eyes: EOM are normal. Pupils are equal, round, and reactive to light.   Neck: Normal range of motion. Neck supple. No JVD present.   Cardiovascular: Regular rhythm.    Murmur (3/6 systolic murmur, not heard best at any  location) heard.  tachycardic   Pulmonary/Chest: Effort normal. Wheezes: inspiratory and expiratory.   Diffuse fine inspiratory crackles  No wheezes  On NC 4 L   Abdominal: Soft. Bowel sounds are normal. She exhibits no distension. There is no tenderness. There is no guarding.   Genitourinary:   Genitourinary Comments: Peoples in place draining clear urine   Musculoskeletal: She exhibits no edema, tenderness or deformity.   S/p L mastectomy (with lymphectomy)   Neurological: She is alert and oriented to person, place, and time. No cranial nerve deficit.   Patient was unable to talk due to BiPAP and resp distress   Skin: Skin is warm and dry. She is not diaphoretic.   Trophic changes to BLE suggesting recent edema but none present today.    Psychiatric: She has a normal mood and affect.   Somnolent        Significant Labs:   ABGs: No results for input(s): PH, PCO2, HCO3, POCSATURATED, BE, TOTALHB, COHB, METHB, O2HB, POCFIO2 in the last 48 hours.  Blood Culture: No results for input(s): LABBLOO in the last 48 hours.  CBC:     Recent Labs  Lab 10/17/17  0636 10/18/17  0735   WBC 21.80* 22.17*   HGB 8.7* 9.1*   HCT 28.1* 28.2*    261     CMP:   Recent Labs  Lab 10/17/17  0234  10/17/17  1822 10/18/17  0600 10/18/17  0735   *  < > 147* 147* 147*   K 4.0  < > 5.0 5.1 5.0     < > 107 106 106   CO2 26  < > 28 28 25   *  < > 278* 349* 378*   BUN 91*  < > 100* 113* 111*   CREATININE 2.6*  < > 2.8* 3.1* 3.1*   CALCIUM 9.4  < > 9.0 8.6* 8.8   PROT 7.3  --   --   --   --    ALBUMIN 3.0*  --   --   --   --    BILITOT 0.7  --   --   --   --    ALKPHOS 290*  --   --   --   --    AST 73*  --   --   --   --    ALT 52*  --   --   --   --    ANIONGAP 18*  < > 12 13 16   EGFRNONAA 18.0*  < > 16.5* 14.6* 14.6*   < > = values in this interval not displayed.  Respiratory Culture: No results for input(s): GSRESP, RESPIRATORYC in the last 48 hours.  Urine Culture: No results for input(s): LABURIN in the last 48  hours.  Urine Studies: No results for input(s): COLORU, APPEARANCEUA, PHUR, SPECGRAV, PROTEINUA, GLUCUA, KETONESU, BILIRUBINUA, OCCULTUA, NITRITE, UROBILINOGEN, LEUKOCYTESUR, RBCUA, WBCUA, BACTERIA, SQUAMEPITHEL, HYALINECASTS in the last 48 hours.    Invalid input(s): WRIGHTSUR      Assessment/Plan:      * Acute on chronic diastolic heart failure    -patient had a repeat echo on 10/7/17 which showed LVEF 60-65%, PASP 70-75, moderate TR  -Venous US R LE was negative for DVT  -patient was treated at OSH with Bumex 2mg IV x1 day and albumin 1 hour prior  -cardiology was consulted; they reviewed CT and CXR images and due to these findings and bump in Cr after diuresis, they felt that the resp failure was more like PNA in etiology.    -patient was subsequently transferred here for higher level of care/pulmonary lavage    -S/p significant IV diuresis, now with elevated creatinine. Stopping diuresis now as feel patient's lung exam is secondary to chronic disease and not fluid overload.           Type 2 diabetes mellitus with hyperglycemia, with long-term current use of insulin    Blood glucose rising today.   Was previously on insulin drip.   Titrating up insulin, she has received 29 units of long acting and 15 units of short acting today + amount she received while briefly on drip.   -Increased basal to 30 (and given extra 10 today) and 15 units with meals.   -On moderate correction sliding scale    Will recheck BMP tonight. If over 500 will start insulin drip.           Hypernatremia    Elevated today. Patient just transitioned to oral today and requires nectar thick liquids. Na may increase but holding off on NG tube for free water boluses due to recent transition back on PO. Holding D5 due to hyperglycemia. Will monitor with BMP tonight and repeat in AM.         Acute on chronic respiratory failure with hypoxia    Improved, now comfortable on NC oxygen          Type 2 diabetes mellitus with stage 3 chronic kidney  disease, with long-term current use of insulin    -patients baseline Cr appears to be around 1.7, increased to 3.1 today. Urine lytes sent, pending. Peoples catheter in place, not obstructed  -Suspect 2/2 aggressive diuresis.   -will closely monitor with daily MPs          Normocytic anemia    -labs consistent with iron deficiency  -continue ferrous sulfate 325  -patient required 2U transfusion at OSH  -will get CBC q daily and trend           HLD (hyperlipidemia)    -continue with crestor 20mg          Essential hypertension    on amlodipine 10mg  BP was elevated yesterday but has been lower today. Will monitor and titrate as needed, may reduce dose if patient continues to be low          Coronary artery disease involving native coronary artery of native heart without angina pectoris    -continue on Aspirin 81 and Plavix 75            VTE Risk Mitigation         Ordered     heparin (porcine) injection 5,000 Units  Every 8 hours     Route:  Subcutaneous        10/16/17 1320     Place sequential compression device  Until discontinued      10/13/17 2324     Medium Risk of VTE  Once      10/13/17 2316        Diet: Dental soft with nectar thick liquids    Micheal Nevarez MD   Internal Medicine PGY-1  [phone]

## 2017-10-18 NOTE — PLAN OF CARE
Problem: Patient Care Overview  Goal: Plan of Care Review  Outcome: Ongoing (interventions implemented as appropriate)  Patient remained free from falls/injury throughout shift. No complaints of chest pain or SOB. VSS. Tube feedings held due to patient experiencing nausea and vomiting. Ordered by Dr. Cabrera to resume feedings when patient doesn't experience anymore n/v. Attempted to restart feedings at 0200, but patient's son refused and asked if it could be restarted in the morning. Phenergan IVPGK given for nausea. Patient obtained full relief. BG elevated. Insulin given per MD orders. Accuchecks ACHS. VSS. Plan of care reviewed with patient. All questions encouraged and answered. Will continue to monitor.

## 2017-10-18 NOTE — PLAN OF CARE
Problem: Occupational Therapy Goal  Goal: Occupational Therapy Goal  Goals to be met by: 11/08/2017    Patient will increase functional independence with ADLs by performing:    UE Dressing with Supervision.  LE Dressing with Minimal Assistance.  Grooming while seated with Supervision.  Toileting from bedside commode with Minimal Assistance for hygiene and clothing management.   Sitting at edge of bed or bedside chair  x30 minutes with Supervision.  Rolling to Bilateral with Modified [place].   Supine to sit with Supervision.  Stand pivot transfers with Supervision.  Toilet transfer to bedside commode with Supervision.     Outcome: Ongoing (interventions implemented as appropriate)  Goals revised and remain appropriate.     Comments: Cont OT POC

## 2017-10-18 NOTE — PROGRESS NOTES
Notified Dr. Deborah MD of  368. Ordered to give 5 units of novolog and 5 units of levemir. Will continue to monitor.

## 2017-10-18 NOTE — PLAN OF CARE
Problem: SLP Goal  Goal: SLP Goal  Speech Language Pathology Goals  Goals expected to be met by 10/25    1. Pt will tolerate diet of nectar thick liquids and dental soft solids without overt s/s of aspiration   2. Pt will participate in trials of thin liquids within speech therapy sessions to help determine least restrictive diet     3. Pt will participate in dysphagia exercises x10 to enhance swallow function      Pt with increased progress towards goals     Danyell Eldridge MS, CCC-SLP  Speech Language Pathologist  Pager: [phone]  Date 10/18/2017

## 2017-10-18 NOTE — ASSESSMENT & PLAN NOTE
-labs consistent with iron deficiency  -continue ferrous sulfate 325  -patient required 2U transfusion at OSH  -will get CBC q daily and trend

## 2017-10-18 NOTE — SUBJECTIVE & OBJECTIVE
Interval History:  72 F with cryptogenic PNA ICU stepdown s/p intubation and recovering from septic shock. Has been on zyvox, levaquin, cefepime, rocephin and s/p aggressive IV diuresis. On Prednisone 60mg BID and is chronically on 20mg daily for chronic lung disease.   Overnight patient was nauseated and pulled out NG tube.   This morning patient has no complaints. Nausea resolved. Son feels her mental status is at baseline and patient is very aware of surroudings.     Review of Systems   Constitutional: Negative for chills and fever.   HENT: Negative for congestion and sore throat.    Eyes: Negative for photophobia and visual disturbance.   Respiratory: Positive for shortness of breath (Improved). Negative for cough.    Cardiovascular: Negative for chest pain, palpitations and leg swelling.   Gastrointestinal: Negative for abdominal pain, constipation, diarrhea, nausea and vomiting.   Genitourinary:        Catheterized   Musculoskeletal: Negative for arthralgias and myalgias.   Skin: Negative for rash and wound.   Neurological: Negative for syncope and headaches.   Psychiatric/Behavioral: Negative for agitation and confusion.     Objective:     Vital Signs (Most Recent):  Temp: 97.4 °F (36.3 °C) (10/18/17 1200)  Pulse: (!) 115 (10/18/17 1215)  Resp: 17 (10/18/17 1215)  BP: 138/83 (10/18/17 1200)  SpO2: 95 % (10/18/17 1215) Vital Signs (24h Range):  Temp:  [97.3 °F (36.3 °C)-98.2 °F (36.8 °C)] 97.4 °F (36.3 °C)  Pulse:  [] 115  Resp:  [16-30] 17  SpO2:  [92 %-99 %] 95 %  BP: (117-138)/(56-83) 138/83     Weight: 85.5 kg (188 lb 7.9 oz)  Body mass index is 31.37 kg/m².    Physical Exam   Constitutional: She is oriented to person, place, and time. No distress.   Thin, elderly female. Somewhat somnolent.    HENT:   Head: Normocephalic and atraumatic.   No NG tube in place   Eyes: EOM are normal. Pupils are equal, round, and reactive to light.   Neck: Normal range of motion. Neck supple. No JVD present.    Cardiovascular: Regular rhythm.    Murmur (3/6 systolic murmur, not heard best at any location) heard.  tachycardic   Pulmonary/Chest: Effort normal. Wheezes: inspiratory and expiratory.   Diffuse fine inspiratory crackles  No wheezes  On NC 4 L   Abdominal: Soft. Bowel sounds are normal. She exhibits no distension. There is no tenderness. There is no guarding.   Genitourinary:   Genitourinary Comments: Peoples in place draining clear urine   Musculoskeletal: She exhibits no edema, tenderness or deformity.   S/p L mastectomy (with lymphectomy)   Neurological: She is alert and oriented to person, place, and time. No cranial nerve deficit.   Patient was unable to talk due to BiPAP and resp distress   Skin: Skin is warm and dry. She is not diaphoretic.   Trophic changes to BLE suggesting recent edema but none present today.    Psychiatric: She has a normal mood and affect.   Somnolent        Significant Labs:   ABGs: No results for input(s): PH, PCO2, HCO3, POCSATURATED, BE, TOTALHB, COHB, METHB, O2HB, POCFIO2 in the last 48 hours.  Blood Culture: No results for input(s): LABBLOO in the last 48 hours.  CBC:     Recent Labs  Lab 10/17/17  0636 10/18/17  0735   WBC 21.80* 22.17*   HGB 8.7* 9.1*   HCT 28.1* 28.2*    261     CMP:   Recent Labs  Lab 10/17/17  0234  10/17/17  1822 10/18/17  0600 10/18/17  0735   *  < > 147* 147* 147*   K 4.0  < > 5.0 5.1 5.0     < > 107 106 106   CO2 26  < > 28 28 25   *  < > 278* 349* 378*   BUN 91*  < > 100* 113* 111*   CREATININE 2.6*  < > 2.8* 3.1* 3.1*   CALCIUM 9.4  < > 9.0 8.6* 8.8   PROT 7.3  --   --   --   --    ALBUMIN 3.0*  --   --   --   --    BILITOT 0.7  --   --   --   --    ALKPHOS 290*  --   --   --   --    AST 73*  --   --   --   --    ALT 52*  --   --   --   --    ANIONGAP 18*  < > 12 13 16   EGFRNONAA 18.0*  < > 16.5* 14.6* 14.6*   < > = values in this interval not displayed.  Respiratory Culture: No results for input(s): GSRESP, RESPIRATORYC in  the last 48 hours.  Urine Culture: No results for input(s): LABURIN in the last 48 hours.  Urine Studies: No results for input(s): COLORU, APPEARANCEUA, PHUR, SPECGRAV, PROTEINUA, GLUCUA, KETONESU, BILIRUBINUA, OCCULTUA, NITRITE, UROBILINOGEN, LEUKOCYTESUR, RBCUA, WBCUA, BACTERIA, SQUAMEPITHEL, HYALINECASTS in the last 48 hours.    Invalid input(s): WRIGHTSIMIR

## 2017-10-18 NOTE — ASSESSMENT & PLAN NOTE
-patient had a repeat echo on 10/7/17 which showed LVEF 60-65%, PASP 70-75, moderate TR  -Venous US R LE was negative for DVT  -patient was treated at OSH with Bumex 2mg IV x1 day and albumin 1 hour prior  -cardiology was consulted; they reviewed CT and CXR images and due to these findings and bump in Cr after diuresis, they felt that the resp failure was more like PNA in etiology.    -patient was subsequently transferred here for higher level of care/pulmonary lavage    -S/p significant IV diuresis, now with elevated creatinine. Stopping diuresis now as feel patient's lung exam is secondary to chronic disease and not fluid overload.

## 2017-10-18 NOTE — ASSESSMENT & PLAN NOTE
Blood glucose rising today.   Was previously on insulin drip.   Titrating up insulin, she has received 29 units of long acting and 15 units of short acting today + amount she received while briefly on drip.   -Increased basal to 30 (and given extra 10 today) and 15 units with meals.   -On moderate correction sliding scale    Will recheck BMP tonight. If over 500 will start insulin drip.

## 2017-10-18 NOTE — PROGRESS NOTES
Notified Dr. Deborah MD of patient pulling out NG tube. No new orders were given. Verbalized to just let SLP know and await for new orders for them. Will relay to day shift nurse. Will continue to monitor.

## 2017-10-18 NOTE — PT/OT/SLP PROGRESS
Physical Therapy  Treatment    Edie Hays   MRN: [id number]   Admitting Diagnosis: Acute on chronic diastolic heart failure    PT Received On: 10/18/17  PT Start Time: 0841     PT Stop Time: 0905    PT Total Time (min): 24 min       Billable Minutes:  Neuromuscular Re-education 24 min (Co-tx with OT)    Treatment Type: Treatment  PT/PTA: PT     PTA Visit Number: 0       General Precautions: Standard, fall, NPO  Orthopedic Precautions: N/A   Braces: N/A    Do you have any cultural, spiritual, Muslim conflicts, given your current situation?: none reported     Subjective:  Communicated with RN prior to session. Pt agreeable to participate in therapy session.     Pain/Comfort  Pain Rating 1: 0/10  Pain Rating Post-Intervention 1: 0/10    Objective:   Patient found with: oxygen, telemetry, trevizo catheter    Functional Mobility:  Bed Mobility:   Rolling/Turning to Left: Maximum assistance  Rolling/Turning Right: Moderate assistance  Scooting/Bridging: Total Assistance  Supine to Sit: Maximum Assistance, With side rail  Sit to Supine: Moderate Assistance    Transfers:   Unable to attempt transfers this visit; pt with poor sitting balance and requesting to return to supine     Balance:   Static Sit: POOR: fluctuating performance; primarily required mod-max A to maintain, with moments of CGA   Dynamic Sit: POOR    Therapeutic Activities and Exercises:  Therapist facilitated neuromuscular re-education to improve pt's gross motor coordination and independence during functional mobility tasks, sitting balance, trunk control, and postural awareness. Pt required total A for cueing on sequencing of sup<>sit transfers and hand placement. Pt tolerated sitting EOB ~15 minutes with assistance ranging from CGA to max A. Therapist provided tactile and verbal cueing for pt to improve midline orientation, posture, engagement of trunk musculature, and weight bearing through BUEs for postural support. Pt also performed ankle pumps x 10  reps in supine for BLE circulation and AROM.     AM-PAC 6 CLICK MOBILITY  How much help from another person does this patient currently need?   1 = Unable, Total/Dependent Assistance  2 = A lot, Maximum/Moderate Assistance  3 = A little, Minimum/Contact Guard/Supervision  4 = None, Modified [place]/Independent    Turning over in bed (including adjusting bedclothes, sheets and blankets)?: 2  Sitting down on and standing up from a chair with arms (e.g., wheelchair, bedside commode, etc.): 1  Moving from lying on back to sitting on the side of the bed?: 2  Moving to and from a bed to a chair (including a wheelchair)?: 1  Need to walk in hospital room?: 1  Climbing 3-5 steps with a railing?: 1  Total Score: 8    AM-PAC Raw Score CMS G-Code Modifier Level of Impairment Assistance   6 % Total / Unable   7 - 9 CM 80 - 100% Maximal Assist   10 - 14 CL 60 - 80% Moderate Assist   15 - 19 CK 40 - 60% Moderate Assist   20 - 22 CJ 20 - 40% Minimal Assist   23 CI 1-20% SBA / CGA   24 CH 0% Independent/ Mod I     Patient left HOB elevated with all lines intact, call button in reach, RN notified and family member present.    Assessment:  Edie Hays is a 72 y.o. female with a medical diagnosis of Acute on chronic diastolic heart failure. Pt continues to demonstrate impaired trunk control, generalized weakness, poor functional mobility, and decreased endurance. Pt would benefit from continued PT intervention to address below listed deficits and maximize return to PLOF.       Rehab identified problem list/impairments: Rehab identified problem list/impairments: weakness, impaired endurance, impaired self care skills, impaired functional mobilty, impaired balance, decreased safety awareness, impaired cardiopulmonary response to activity    Rehab potential is good.    Activity tolerance: Fair    Discharge recommendations: Discharge Facility/Level Of Care Needs: nursing facility, skilled     Barriers to discharge: Barriers  to Discharge: Decreased caregiver support    Equipment recommendations: Equipment Needed After Discharge:  (TBD at next level of care)     GOALS:    Physical Therapy Goals        Problem: Physical Therapy Goal    Goal Priority Disciplines Outcome Goal Variances Interventions   Physical Therapy Goal     PT/OT, PT Ongoing (interventions implemented as appropriate)     Description:  Goals to be met by: 10/23/2017    Patient will increase functional independence with mobility by performin. Supine to sit with MInimal Assistance - not met  2. Sit to stand transfer with Moderate Assistance - not met  3. Gait  x 50 feet with Moderate Assistance using Rolling Walker. - not met  4. Sitting at edge of bed x8 minutes with Minimal Assistance - not met   5. Stand for 5 minutes with Minimal Assistance using Rolling Walker. - not met  6. Lower extremity exercise program x15 reps per handout, with independence - not met                              PLAN:    Patient to be seen 5 x/week  to address the above listed problems via gait training, therapeutic activities, therapeutic exercises, neuromuscular re-education  Plan of Care expires: 17  Plan of Care reviewed with: patient, family        Shakila Munguia PT, DPT   10/18/2017  Pager: [phone]

## 2017-10-18 NOTE — PT/OT/SLP PROGRESS
Occupational Therapy  Treatment    Edie Hays   MRN: [id number]   Admitting Diagnosis: Acute on chronic diastolic heart failure    OT Date of Treatment: 10/18/17   OT Start Time: 0841  OT Stop Time: 0905  OT Total Time (min): 24 min    Billable Minutes:  Therapeutic Activity 12 and Therapeutic Exercise 12    General Precautions: Standard, fall  Orthopedic Precautions: N/A       Subjective:  Communicated with RN prior to session.  Pt agreeable to therapy today.  Pain/Comfort  Pain Rating 1: 0/10  Pain Rating Post-Intervention 1: 0/10    Objective:  Patient found supine in bed w/ RN present with: oxygen, telemetry, trevizo catheter     Functional Mobility:  Bed Mobility:  Rolling/Turning to Left: Maximum assistance  Rolling/Turning Right: Moderate assistance  Scooting/Bridging: Total Assistance  Supine to Sit: Maximum Assistance  Sit to Supine: Moderate Assistance    Transfers:   Sit <> Stand Assistance: Activity did not occur    Functional Ambulation: Activity did not occur      Balance:   Static Sit: POOR: Needs MODERATE assist to maintain - MAXIMUM  Dynamic Sit: 0: N/A  Static Stand: 0: Needs MAXIMAL assist to maintain   Dynamic stand: 0: N/A    Therapeutic Activities and Exercises:  Pt educated on OT POC and importance of OOB activity. Pt required MOD-MAX for bed mobility and static sitting balance at EOB. Pt educated on and participated in BUE exercises w/ MAX-Total A sitting EOB and then at bed level. OT recommending skilled nursing facility to maximize functional participation and performance in self-care and functional mobility tasks.     AM-PAC 6 CLICK ADL   How much help from another person does this patient currently need?   1 = Unable, Total/Dependent Assistance  2 = A lot, Maximum/Moderate Assistance  3 = A little, Minimum/Contact Guard/Supervision  4 = None, Modified [place]/Independent    Putting on and taking off regular lower body clothing? : 1  Bathing (including washing, rinsing, drying)?:  "1  Toileting, which includes using toilet, bedpan, or urinal? : 1  Putting on and taking off regular upper body clothing?: 2  Taking care of personal grooming such as brushing teeth?: 2  Eating meals?: 2  Total Score: 9     AM-PAC Raw Score CMS "G-Code Modifier Level of Impairment Assistance   6 % Total / Unable   7 - 8 CM 80 - 100% Maximal Assist   9-13 CL 60 - 80% Moderate Assist   14 - 19 CK 40 - 60% Moderate Assist   20 - 22 CJ 20 - 40% Minimal Assist   23 CI 1-20% SBA / CGA   24 CH 0% Independent/ Mod I       Patient left supine with all lines intact, call button in reach and family member present    ASSESSMENT:  Edie Hays is a 72 y.o. female with a medical diagnosis of Acute on chronic diastolic heart failure and presents with weakness and fatigue throughout session. Pt demonstrated difficulty sitting EOB, tending to tasks, and functionally participating in BUE therapeutic exercises. Pt continues to benefit from skilled OT services to increase strength & endurance to improve participation in ADLs.     Rehab identified problem list/impairments: Rehab identified problem list/impairments: weakness, impaired endurance, gait instability, impaired functional mobilty, impaired self care skills, impaired balance, decreased ROM, impaired cardiopulmonary response to activity    Rehab potential is fair.    Activity tolerance: Poor    Discharge recommendations: Discharge Facility/Level Of Care Needs: nursing facility, skilled     Barriers to discharge: Barriers to Discharge: Decreased caregiver support    Equipment recommendations:       GOALS:    Occupational Therapy Goals        Problem: Occupational Therapy Goal    Goal Priority Disciplines Outcome Interventions   Occupational Therapy Goal     OT, PT/OT Ongoing (interventions implemented as appropriate)    Description:  Goals to be met by: 11/08/2017    Patient will increase functional independence with ADLs by performing:    UE Dressing with Supervision.  LE " Dressing with Minimal Assistance.  Grooming while seated with Supervision.  Toileting from bedside commode with Minimal Assistance for hygiene and clothing management.   Sitting at edge of bed or bedside chair  x30 minutes with Supervision.  Rolling to Bilateral with Modified [place].   Supine to sit with Supervision.  Stand pivot transfers with Supervision.  Toilet transfer to bedside commode with Supervision.                      Plan:  Patient to be seen 4 x/week to address the above listed problems via self-care/home management, therapeutic activities, therapeutic exercises  Plan of Care expires: 11/07/17  Plan of Care reviewed with: patient         Jennifer NICOLA Crowder, OT  10/18/2017

## 2017-10-18 NOTE — ASSESSMENT & PLAN NOTE
on amlodipine 10mg  BP was elevated yesterday but has been lower today. Will monitor and titrate as needed, may reduce dose if patient continues to be low

## 2017-10-18 NOTE — NURSING
Critical BS from chemistry lab = 481 at 1434 hours.  CMP lab drawn and another critical BS reported.  BS = 489 at 1442 hours.  Bedside BS at 1441 hours = 445.  Administered 10 units with meal.

## 2017-10-18 NOTE — PLAN OF CARE
SW spoke to Michelle with [facility] ([phone]).  They are reviewing case and will call SW with a decision.    Bernarda Martell LCSW     [phone]  Critical Care (MICU)

## 2017-10-18 NOTE — PROGRESS NOTES
Patient still experiencing nausea. Will continue to hold tube feedings per MD orders. Will continue to monitor.

## 2017-10-18 NOTE — PT/OT/SLP PROGRESS
Speech Language Pathology  Treatment    Edie Hays   MRN: [id number]   Admitting Diagnosis: Acute on chronic diastolic heart failure    Diet recommendations: Solid Diet Level: Dental Soft  Liquid Diet Level: Nectar Thick   · Feed only when awake/alert  · HOB to 90 degrees  · Small bites/sips  · 1 bite/sip at a time  · Meds whole 1 at a time  · Eliminate distractions  · Assistance with meals   · Assistance with thickening liquids    SLP Treatment Date: 10/18/17  Speech Start Time: 1058     Speech Stop Time: 1122     Speech Total (min): 24 min       TREATMENT BILLABLE MINUTES:  Treatment Swallowing Dysfunction 24    Has the patient been evaluated by SLP for swallowing? : Yes  Keep patient NPO?: No   General Precautions: Standard, fall  Current Respiratory Status: nasal cannula       Subjective:  Pt awake, alert and willingly cooperated in therapy session     Pain/Comfort  Pain Rating 1: 0/10  Pain Rating Post-Intervention 1: 0/10    Objective:     Pt tolerated transition to HOB upright. RN aware SLP at the bedside re-assessing swallow function. RN reports Pt pulled Pt offered ice chips x2, teaspoon of water x2, and single sips of water from open cup x2. Pt remains with functional oral phase however Pt continues to present multiple swallows and throat clearing following all thin liquid trials. With slp transition to nectar thick liquids Pt status improved greatly. Pt consumed 4oz of nectar thick liquids via single open cup sips using single swallows and no additional clinical concerns for aspiration. Pt also offered trials of regular solid. Pt with functional yet significantly prolonged mastication patterns unable to determine if related to weakness vs. Inattention. Discussed with family members present at the bedside that safest diet upgrade at this time appears to be dental soft solids and nectar thick liquids. Education provided to Pt  And family members how to appropriately thicken liquids. White board updated  accordingly. Medical team contacted re: diet upgrade.     Assessment:  Edie Hays is a 72 y.o. female with a medical diagnosis of Acute on chronic diastolic heart failure and presents with dysphagia.     Discharge recommendations: Discharge Facility/Level Of Care Needs: nursing facility, skilled     Goals:    SLP Goals        Problem: SLP Goal    Goal Priority Disciplines Outcome   SLP Goal     SLP    Description:  Speech Language Pathology Goals  Goals expected to be met by 10/25    1. Pt will tolerate diet of nectar thick liquids and dental soft solids without overt s/s of aspiration   2. Pt will participate in trials of thin liquids within speech therapy sessions to help determine least restrictive diet     3. Pt will participate in dysphagia exercises x10 to enhance swallow function                      Plan:   Patient to be seen Therapy Frequency: 5 x/week   Plan of Care expires: 11/15/17  Plan of Care reviewed with: patient, family  SLP Follow-up?: Yes              Danyell Eldridge CCC-SLP  10/18/2017

## 2017-10-18 NOTE — PROGRESS NOTES
"  [facility]  Adult Nutrition  Consult Note    SUMMARY     Recommendations    1. Continue current dental soft diet w/ nectar thick liquids (texture per SLP). Add diabetic diet restrictions if BS continues to trend high.   2. If enteral nutrition warranted, recommend Diabetisource @ 60 mL/hr to provide 1728 calories, 86 g of protein, 1178 mL fluid.   3. RD to monitor & follow-up.    Goals: Meet % EEN, EPN  Nutrition Goal Status: new  Communication of RD Recs: reviewed with RN    Reason for Assessment    Reason for Assessment: new tube feeding  Diagnosis: other (see comments) (HF)  Relevent Medical History: DM, Breast ca   Interdisciplinary Rounds: did not attend     General Information Comments: RD triggered for new TF, however pt pulled out NGT last night. Diet advanced to dental soft w/ nectar thick liquids.  Nutrition Discharge Planning: Adequate PO intake    Nutrition Prescription Ordered    Current Diet Order: Dental soft w/ nectar thick liquids     Current Nutrition Support Formula Ordered: Diabetisource (Ordered, not running at time of visit)     Nutrition/Diet History    Patient Reported Diet/Restrictions/Preferences: other (see comments) (TERRELL)     Factors Affecting Nutritional Intake: other (see comments) (Diet just advanced; unsure of PO intake.)    Labs/Tests/Procedures/Meds    Pertinent Labs Reviewed: reviewed, pertinent  Pertinent Labs Comments: Na 147, , Creat 3.1, GFR 16.8, Gluc 126-378, A1C 6.9  Pertinent Medications Reviewed: reviewed, pertinent  Pertinent Medications Comments: Insulin, Prednisone    Physical Findings    Overall Physical Appearance: overweight  Oral/Mouth Cavity: WDL  Skin: other (see comments) (Wounds)    Anthropometrics    Height: 5' 5" (165.1 cm)  Weight Method: Bed Scale  Weight: 85.5 kg (188 lb 7.9 oz)     Ideal Body Weight (IBW), Female: 125 lb  % Ideal Body Weight, Female (lb): 150.8 lb     BMI (Calculated): 31.4  BMI Grade: 30 - 34.9- obesity " - grade I    Estimated/Assessed Needs    Weight Used For Calorie Calculations: 85.5 kg (188 lb 7.9 oz)      Energy Calorie Requirements (kcal): 1718 kcal/d  Energy Need Method: [place]-St Jeor (1.25 PAL)     Weight Used For Protein Calculations: 85.5 kg (188 lb 7.9 oz)  Protein Requirements: 69-86 g/d (.8-1 g/kg)     Fluid Need Method: RDA Method, other (see comments) (Per MD or 1 mL/kcal)    CHO Requirement: 50% total kcals     Assessment and Plan    Inadequate energy intake r/t inability to consume sufficient energy AEB NPO with no alternate means of nutrition.  Status: New    Monitor and Evaluation    Food and Nutrient Intake: energy intake, food and beverage intake  Food and Nutrient Adminstration: diet order     Physical Activity and Function: nutrition-related ADLs and IADLs  Anthropometric Measurements: weight change, weight, body mass index  Biochemical Data, Medical Tests and Procedures: lipid profile, inflammatory profile, glucose/endocrine profile, gastrointestinal profile, electrolyte and renal panel  Nutrition-Focused Physical Findings: overall appearance    Nutrition Risk    Level of Risk: other (see comments) (1x/week)    Nutrition Follow-Up    RD Follow-up?: Yes

## 2017-10-18 NOTE — ASSESSMENT & PLAN NOTE
-patients baseline Cr appears to be around 1.7, increased to 3.1 today. Urine lytes sent, pending. Peoples catheter in place, not obstructed  -Suspect 2/2 aggressive diuresis.   -will closely monitor with daily MPs

## 2017-10-18 NOTE — PLAN OF CARE
Problem: Physical Therapy Goal  Goal: Physical Therapy Goal  Goals to be met by: 10/23/2017    Patient will increase functional independence with mobility by performin. Supine to sit with MInimal Assistance - not met  2. Sit to stand transfer with Moderate Assistance - not met  3. Gait  x 50 feet with Moderate Assistance using Rolling Walker. - not met  4. Sitting at edge of bed x8 minutes with Minimal Assistance - not met   5. Stand for 5 minutes with Minimal Assistance using Rolling Walker. - not met  6. Lower extremity exercise program x15 reps per handout, with independence - not met            Outcome: Ongoing (interventions implemented as appropriate)  No goals met this visit; continue current POC.     Shakila Munguia PT, DPT   10/18/2017  Pager: [phone]

## 2017-10-18 NOTE — PLAN OF CARE
SW spoke to CM who informed her that pt's family chose [facility] for SNF, and has not yet made any other choices.    [facility] and [facility] are in [place].    JEFFREY informed [facility] of referral and will f/u.    Bernarda Martell LCSW     [phone]  Critical Care (MICU)

## 2017-10-18 NOTE — NURSING TRANSFER
Nursing Transfer Note      10/17/2017     Transfer To:     Transfer via stretcher    Transfer with NC to O2, cardiac monitoring    Transported by Rn, PCT     Medicines sent: Insulin, Phosphate Tablet     Chart send with patient: Yes    Notified: son    Patient reassessed at: 10/17/17 2000     Upon arrival to floor: cardiac monitor applied, patient oriented to room, call bell in reach and bed in lowest position

## 2017-10-18 NOTE — PROGRESS NOTES
Paged IMT to verify with giving PO meds. Patient is unable to tolerate feedings through NG tube. Holding pill meds. Awaiting phone call back. Will continue to monitor.

## 2017-10-19 PROBLEM — E11.01 HYPERGLYCEMIC HYPEROSMOLAR NONKETOTIC COMA: Status: ACTIVE | Noted: 2017-01-01

## 2017-10-19 PROBLEM — R33.9 URINARY RETENTION: Status: ACTIVE | Noted: 2017-01-01

## 2017-10-19 NOTE — PT/OT/SLP PROGRESS
"Speech Language Pathology  Treatment /Discharge Summary     Edie Hays   MRN: [id number]   Admitting Diagnosis: Acute on chronic respiratory failure with hypoxia    Diet recommendations: Solid Diet Level: Dental Soft  Liquid Diet Level: Thin Feed only when awake/alert, HOB to 90 degrees, 1 bite/sip at a time, Meds whole 1 at a time and Eliminate distractions    SLP Treatment Date: 10/19/17  Speech Start Time: 1202     Speech Stop Time: 1212     Speech Total (min): 10 min       TREATMENT BILLABLE MINUTES:  Treatment Swallowing Dysfunction 10    Has the patient been evaluated by SLP for swallowing? : Yes  Keep patient NPO?: No   General Precautions: Standard, fall  Current Respiratory Status:  (room air)       Subjective:  I'm doing much MUCH better"    Pain/Comfort  Pain Rating 1: 0/10  Pain Rating Post-Intervention 1: 0/10    Objective:    Pt with thin liquids at the bedside despite previous recommendation of nectar thick liquids. Son reported he trailed thin liquids with his mother and Pt was not longer demonstrating any throat clearing behaviors so he didn't think she needed it anymore. SLP offered education to son re: purpose of recommendations and importance of following . Son also feels status has improved significantly as Pt no longer overtly confused. Upon SLP  trials of thin liquids Pt consumed approx 4oz via consecutive straw sips and Pt did actually appear safe to advance to thin liquids. Pt continues to chew solids very slowly and son reporting dentures are at home. SLP discussed advancing diet and this time and for family to advance to regular solids when has dentures as appropriate. No further speech follow up warranted at this time.     Assessment:  Edie Hays is a 72 y.o. female with a medical diagnosis of Acute on chronic respiratory failure with hypoxia and presents with resolving dysphagia.    Discharge recommendations: Discharge Facility/Level Of Care Needs: nursing facility, skilled     Goals:    " SLP Goals     Not on file          Multidisciplinary Problems (Resolved)        Problem: SLP Goal    Goal Priority Disciplines Outcome   SLP Goal   (Resolved)     SLP Outcome(s) achieved   Description:  Speech Language Pathology Goals  Goals expected to be met by 10/25    1. Pt will tolerate diet of nectar thick liquids and dental soft solids without overt s/s of aspiration   2. Pt will participate in trials of thin liquids within speech therapy sessions to help determine least restrictive diet     3. Pt will participate in dysphagia exercises x10 to enhance swallow function                      Plan:   Patient to be seen Therapy Frequency: 5 x/week   Plan of Care expires: 11/15/17  Plan of Care reviewed with: patient, son  SLP Follow-up?: No       Danyell Eldridge CCC-SLP  10/19/2017

## 2017-10-19 NOTE — ASSESSMENT & PLAN NOTE
Stable. Patient just transitioned to oral yesterday and requires nectar thick liquids. Na may increase but holding off on NG tube for free water boluses due to recent transition back on PO. Holding D5 due to hyperglycemia. Will monitor with BMP Q 4

## 2017-10-19 NOTE — PROGRESS NOTES
[facility] Hospital Medicine  Progress Note    Patient Name: Edie Hays  MRN: [id number]  Patient Class: IP- Inpatient   Admission Date: 10/13/2017  Length of Stay: 6 days  Attending Physician: Daisha Pantoja MD  Primary Care Provider: Primary Doctor [facility] Medicine Team: [facility] MED 3 Micheal Nevarez MD    Subjective:     Principal Problem:Acute on chronic respiratory failure with hypoxia    HPI:  73 y/o female with past medical history of cryptogenic pneumonia (home oxygen; respondent to steroids), HTN, HLP, Type 2 diabetes (on insulin) with polyneuropathy and CKD stage 3 and CAD was admitted to [facility] with acute hypoxic respiratory failure requiring initial hospitalization in ICU and intubated.  Patient has been transferred to [facility] for evaluation of acute hypoxic respiratory failure and pulmonary consult.     Patients cytogenic PNA was first diagnosed in 5/2017 at [facility]. Patient was placed on a steroid taper from 60mg to 30mg daily, continue taper 10mg every 3 days, 20mg daily.    Patient was originally admitted to [facility] on 10/7/17 with septic shock. Patient required intubation with ICU level care. She was treated with zyvox, levoquin and maxipime.  She was extubated and required intermittent BiPAP and oxygen for her respiratory failure. Cardiology had been following the patient at [facility].  Sputum culture with normal growth. Urine culture negative.  BCx X2 were NGTD. Attempts have been made to diuresis patient with IV lasix but resulted in worsening of renal function so have backed off diuresis and trying to use IV Diuril without much success. Due to this, patient was transferred to [facility] for higher level of care and Pulmonary.    Of note, patient has 3 vessel CAD with negative PET stress for perfusion defects done here at [facility] so CABG was not indicated     Interval History:  Elevated BG throughout course. Have escalated insulin  therapy without improvement. Patient with no new complaints. SOB at baseline. No lower extremity edema.     Review of Systems   Constitutional: Negative for chills and fever.   HENT: Negative for congestion and sore throat.    Eyes: Negative for photophobia and visual disturbance.   Respiratory: Positive for shortness of breath (Improved). Negative for cough.    Cardiovascular: Negative for chest pain, palpitations and leg swelling.   Gastrointestinal: Negative for abdominal pain, constipation, diarrhea, nausea and vomiting.   Genitourinary:        Catheterized   Musculoskeletal: Negative for arthralgias and myalgias.   Skin: Negative for rash and wound.   Neurological: Negative for syncope and headaches.   Psychiatric/Behavioral: Negative for agitation and confusion.     Objective:     Vital Signs (Most Recent):  Temp: 97.2 °F (36.2 °C) (10/19/17 0900)  Pulse: 80 (10/19/17 1241)  Resp: 16 (10/19/17 1241)  BP: 129/74 (10/19/17 1100)  SpO2: 96 % (10/19/17 1241) Vital Signs (24h Range):  Temp:  [97.2 °F (36.2 °C)-98.8 °F (37.1 °C)] 97.2 °F (36.2 °C)  Pulse:  [78-91] 80  Resp:  [12-24] 16  SpO2:  [92 %-99 %] 96 %  BP: (112-133)/(53-76) 129/74     Weight: 85.5 kg (188 lb 7.9 oz)  Body mass index is 31.37 kg/m².    Physical Exam   Constitutional: She is oriented to person, place, and time. No distress.   Thin, elderly female. Somewhat somnolent.    HENT:   Head: Normocephalic and atraumatic.   No NG tube in place   Eyes: EOM are normal. Pupils are equal, round, and reactive to light.   Neck: Normal range of motion. Neck supple. No JVD present.   Cardiovascular: Normal rate and regular rhythm.    Murmur (3/6 systolic murmur best heard over parasternal area) heard.  Pulmonary/Chest: Effort normal. Wheezes: inspiratory and expiratory.   Diffuse fine inspiratory crackles  No wheezes  On NC 4 L   Abdominal: Soft. Bowel sounds are normal. She exhibits no distension. There is no tenderness. There is no guarding.    Genitourinary:   Genitourinary Comments: Peoples in place draining clear urine   Musculoskeletal: She exhibits no edema, tenderness or deformity.   S/p L mastectomy (with lymphectomy)   Neurological: She is alert and oriented to person, place, and time. No cranial nerve deficit.   Patient was unable to talk due to BiPAP and resp distress   Skin: Skin is warm and dry. She is not diaphoretic.   Trophic changes to BLE suggesting recent edema but none present today.    Psychiatric: She has a normal mood and affect.   Somnolent        Significant Labs:   ABGs: No results for input(s): PH, PCO2, HCO3, POCSATURATED, BE, TOTALHB, COHB, METHB, O2HB, POCFIO2 in the last 48 hours.  Blood Culture: No results for input(s): LABBLOO in the last 48 hours.  CBC:     Recent Labs  Lab 10/18/17  0735 10/19/17  0530   WBC 22.17* 23.74*   HGB 9.1* 8.5*   HCT 28.2* 25.6*    264     CMP:     Recent Labs  Lab 10/18/17  1330 10/18/17  1342 10/18/17  1941 10/19/17  0530   * 146*  146* 142 144   K 4.6 4.7  4.7 4.5 4.3    105  105 103 104   CO2 25 26  26 25 27   * 489*  489* 433* 383*   * 113*  113* 111* 107*   CREATININE 3.0* 3.1*  3.1* 2.9* 2.8*   CALCIUM 8.5* 8.4*  8.4* 8.0* 8.1*   PROT 6.8  --   --  6.5   ALBUMIN 2.8*  --   --  2.8*   BILITOT 0.6  --   --  0.5   ALKPHOS 260*  --   --  315*   AST 43*  --   --  57*   ALT 49*  --   --  61*   ANIONGAP 15 15  15 14 13   EGFRNONAA 15.2* 14.6*  14.6* 15.8* 16.5*     Respiratory Culture: No results for input(s): GSRESP, RESPIRATORYC in the last 48 hours.  Urine Culture: No results for input(s): LABURIN in the last 48 hours.  Urine Studies:     Recent Labs  Lab 10/18/17  1505   COLORU Yellow   APPEARANCEUA Cloudy*   PHUR 5.0   SPECGRAV 1.015   PROTEINUA 2+*   GLUCUA 2+*   KETONESU Negative   BILIRUBINUA Negative   OCCULTUA 2+*   NITRITE Negative   UROBILINOGEN Negative   LEUKOCYTESUR Trace*   RBCUA 15*   WBCUA 12*   BACTERIA Occasional   SQUAMEPITHEL 2    HYALINECASTS 6*         Assessment/Plan:      * Acute on chronic respiratory failure with hypoxia    Improved, now comfortable on NC oxygen          Urinary retention    Peoples in place  -Will D/C and trial of void today          Type 2 diabetes mellitus with hyperglycemia, with long-term current use of insulin    Blood glucose rising today.   Was previously on insulin drip.   Titrating up insulin, she has received 29 units of long acting and 15 units of short acting today + amount she received while briefly on drip.   -Increased basal to 40 (and given extra 10 today) and 20 units with meals.   Still 440 after 9 units IV regular insulin    -Switched to insulin drip at 4.5 u/hr  -Q 4 BMPs, titrate according to protocol. When under 200, will give 50 detemir            Hypernatremia    Stable. Patient just transitioned to oral yesterday and requires nectar thick liquids. Na may increase but holding off on NG tube for free water boluses due to recent transition back on PO. Holding D5 due to hyperglycemia. Will monitor with BMP Q 4        Type 2 diabetes mellitus with stage 3 chronic kidney disease, with long-term current use of insulin    -patients baseline Cr appears to be around 1.7, increased to 3.1 yesterday. Urine lytes sent pre-renal. Peoples catheter in place, not obstructed  -Suspect 2/2 aggressive diuresis.   -will closely monitor with daily MPs          Acute on chronic diastolic heart failure    -patient had a repeat echo on 10/7/17 which showed LVEF 60-65%, PASP 70-75, moderate TR  -Venous US R LE was negative for DVT  -patient was treated at OSH with Bumex 2mg IV x1 day and albumin 1 hour prior  -cardiology was consulted; they reviewed CT and CXR images and due to these findings and bump in Cr after diuresis, they felt that the resp failure was more like PNA in etiology.    -patient was subsequently transferred here for higher level of care/pulmonary lavage    -S/p significant IV diuresis, now with elevated  creatinine. Stopping diuresis now as feel patient's lung exam is secondary to chronic disease and not fluid overload.   Cr improved today          Normocytic anemia    -labs consistent with iron deficiency  -continue ferrous sulfate 325  -patient required 2U transfusion at OSH  -will get CBC q daily and trend           HLD (hyperlipidemia)    -continue with crestor 20mg          Essential hypertension    on amlodipine 10mg  BP was elevated yesterday but has been lower today. Will monitor and titrate as needed, may reduce dose if patient continues to be low          Coronary artery disease involving native coronary artery of native heart without angina pectoris    -continue on Aspirin 81 and Plavix 75            VTE Risk Mitigation         Ordered     heparin (porcine) injection 5,000 Units  Every 8 hours     Route:  Subcutaneous        10/16/17 1320     Place sequential compression device  Until discontinued      10/13/17 2324     Medium Risk of VTE  Once      10/13/17 2316        Dispo to SNF, likely Monday    Micheal Nevarez MD   Internal Medicine PGY-1  [phone]

## 2017-10-19 NOTE — ASSESSMENT & PLAN NOTE
Blood glucose rising today.   Was previously on insulin drip.   Titrating up insulin, she has received 29 units of long acting and 15 units of short acting today + amount she received while briefly on drip.   -Increased basal to 40 (and given extra 10 today) and 20 units with meals.   Still 440 after 9 units IV regular insulin    -Switched to insulin drip at 4.5 u/hr  -Q 4 BMPs, titrate according to protocol. When under 200, will give 50 detemir

## 2017-10-19 NOTE — PROGRESS NOTES
Pt's trevizo removed at 1240, pt has 8 hours after removal at 1840. Pt and family notified of need to notify RN of first void. Will continue to monitor.

## 2017-10-19 NOTE — PT/OT/SLP PROGRESS
Occupational Therapy  Treatment    Edie Hays   MRN: [id number]   Admitting Diagnosis: Acute on chronic respiratory failure with hypoxia    OT Date of Treatment: 10/19/17   OT Start Time: 0953  OT Stop Time: 1031  OT Total Time (min): 38 min    Billable Minutes:  Self Care/Home Management 38    General Precautions: Standard, fall  Orthopedic Precautions: N/A    Subjective:  Communicated with RN prior to session.  Pt agreeable to OT/PT session  Pain/Comfort  Pain Rating 1: 0/10  Pain Rating Post-Intervention 1: 0/10    Objective:  Patient found with: telemetry, pulse ox (continuous), blood pressure cuff     Functional Mobility:  Bed Mobility:  Rolling/Turning to Left: Moderate assistance  Scooting/Bridging: Supervision  Supine to Sit: Moderate Assistance    Transfers:   Sit <> Stand Assistance: Moderate Assistance (MOD A (x2))  Sit <> Stand Assistive Device: Rolling Walker  Bed <> Chair Technique: Stand Pivot  Bed <> Chair Transfer Assistance: Moderate Assistance (MOD A (x2))  Bed <> Chair Assistive Device: Rolling Walker  Toilet Transfer Technique: Stand Pivot  Toilet Transfer Assistance: Moderate Assistance (MOD A(x2))  Toilet Transfer Assistive Device: Rolling Walker    Functional Ambulation: Pt ambulated w/ RW and MOD A household distance     Activities of Daily Living:  Toileting Where Assessed: Toilet  Toileting Level of Assistance: Total assistance      Balance:   Static Sit: FAIR+: Able to take MINIMAL challenges from all directions  Dynamic Sit: FAIR: Cannot move trunk without losing balance  Static Stand: POOR: Needs MODERATE assist to maintain  Dynamic stand: POOR: N/A    Therapeutic Activities and Exercises:  Pt found supine in bed w/ son present at bedside. Pt educated on importance of OOB activity. Pt required MOD Ax2 for functional transfer sit<>stand x6 trials from various surface (bed, toilet, chair). Pt performed sit<>stand at toilet x4 trials due to required rest breaks from standing during toilet  "hygiene following BM. Pt's oxygen was monitored throughout session.     AM-PAC 6 CLICK ADL   How much help from another person does this patient currently need?   1 = Unable, Total/Dependent Assistance  2 = A lot, Maximum/Moderate Assistance  3 = A little, Minimum/Contact Guard/Supervision  4 = None, Modified [place]/Independent    Putting on and taking off regular lower body clothing? : 1  Bathing (including washing, rinsing, drying)?: 1  Toileting, which includes using toilet, bedpan, or urinal? : 1  Putting on and taking off regular upper body clothing?: 2  Taking care of personal grooming such as brushing teeth?: 2  Eating meals?: 2  Total Score: 9     AM-PAC Raw Score CMS "G-Code Modifier Level of Impairment Assistance   6 % Total / Unable   7 - 8 CM 80 - 100% Maximal Assist   9-13 CL 60 - 80% Moderate Assist   14 - 19 CK 40 - 60% Moderate Assist   20 - 22 CJ 20 - 40% Minimal Assist   23 CI 1-20% SBA / CGA   24 CH 0% Independent/ Mod I       Patient left up in chair with all lines intact, call button in reach and RN present    ASSESSMENT:  Edie Hays is a 72 y.o. female with a medical diagnosis of Acute on chronic respiratory failure with hypoxia and presents with improved activity tolerance and motivation however gradually fell fatigued throughout session. Pt demonstrated increased difficulty w/ transfers from toilet due to weakness and elevated fatigue. Pt continues to benefit from acute OT services to address the below deficits to maximize safety and independence in ADL tasks and functional transfers.    Rehab identified problem list/impairments: Rehab identified problem list/impairments: weakness, impaired endurance, gait instability, impaired functional mobilty, impaired self care skills, impaired balance    Rehab potential is good.    Activity tolerance: Fair    Discharge recommendations: Discharge Facility/Level Of Care Needs: nursing facility, skilled     Barriers to discharge: Barriers to " Discharge: Decreased caregiver support    Equipment recommendations:  (TBD at next level of care)     GOALS:    Occupational Therapy Goals        Problem: Occupational Therapy Goal    Goal Priority Disciplines Outcome Interventions   Occupational Therapy Goal     OT, PT/OT Ongoing (interventions implemented as appropriate)    Description:  Goals to be met by: 11/08/2017    Patient will increase functional independence with ADLs by performing:    UE Dressing with Supervision.  LE Dressing with Minimal Assistance.  Grooming while seated with Supervision.  Toileting from bedside commode with Minimal Assistance for hygiene and clothing management.                         -goal revised: from toilet with Moderate Assistance   Sitting at edge of bed or bedside chair  x30 minutes with Supervision.  Rolling to Bilateral with Modified [place].   Supine to sit with Supervision.  Stand pivot transfers with Supervision.  Toilet transfer to bedside commode with Supervision.                            - toilet                        Plan:  Patient to be seen 4 x/week to address the above listed problems via self-care/home management, therapeutic activities, therapeutic exercises  Plan of Care expires: 11/07/17  Plan of Care reviewed with: patient         Jennifer Crowder, OT  10/19/2017

## 2017-10-19 NOTE — PT/OT/SLP PROGRESS
Physical Therapy  Treatment    Edie Hays   MRN: [id number]   Admitting Diagnosis: Acute on chronic respiratory failure with hypoxia    PT Received On: 10/19/17  PT Start Time: 0953     PT Stop Time: 1031    PT Total Time (min): 38 min       Billable Minutes:  Gait Training 23 and Therapeutic Activity 15   Co-treat with OT    Treatment Type: Treatment  PT/PTA: PT     PTA Visit Number: 0       General Precautions: Standard, fall  Orthopedic Precautions: N/A   Braces: N/A    Do you have any cultural, spiritual, Gnosticist conflicts, given your current situation?: none reported     Subjective:  Communicated with RN prior to session and son present in room only for the beginning of the session. Pt agreeable to session.      Pain/Comfort  Pain Rating 1: 0/10  Pain Rating Post-Intervention 1: 0/10    Objective:   Patient found with: telemetry, pulse ox (continuous), blood pressure cuff    Functional Mobility:  Bed Mobility:   Rolling/Turning Right: Moderate assistance; VC's for technique; physical assist with B LE's and trunk   Scooting/Bridging: Supervision  Supine to Sit: Moderate Assistance; physical assist with B LE's and trunk     Transfers:  Sit <> Stand Assistance: Moderate Assistance (x2) x 6 trials, requiring assist at the hips to clear gluteal region off surface and encourage upright posture  Sit <> Stand Assistive Device: Rolling Walker  Trial 1: from bed  Trail 2-5 from toilet for amaya cleaning; decrease standing tolerance required seated rest breaks   Trial 6: to chair     Stand pivot from toilet to chair: Mod A x2 with RW; pt fatigued from previous ambulation to toilet and standing for amaya cleaning.     Gait:   Gait Distance: 16 ft with RW from bed>toilet   Assistance 1: Moderate assistance  Gait Assistive Device: Rolling walker  Gait Pattern: reciprocal  Gait Deviation(s): decreased nayeli, increased time in double stance, decreased velocity of limb motion, decreased step length, decreased stride length,  decreased toe-to-floor clearance, decreased weight-shifting ability     Pt required assist with B weight shift and VC's for B LE advancement.       Therapeutic Activities and Exercises:  PT educated pt on incr OOB activity including sitting in bedside chair majority of day to improve tolerance to OOB activity.   Sit <> stand x 4 trials on toilet to assist OT with amaya cleaning.            AM-PAC 6 CLICK MOBILITY  How much help from another person does this patient currently need?   1 = Unable, Total/Dependent Assistance  2 = A lot, Maximum/Moderate Assistance  3 = A little, Minimum/Contact Guard/Supervision  4 = None, Modified [place]/Independent    Turning over in bed (including adjusting bedclothes, sheets and blankets)?: 2  Sitting down on and standing up from a chair with arms (e.g., wheelchair, bedside commode, etc.): 2  Moving from lying on back to sitting on the side of the bed?: 2  Moving to and from a bed to a chair (including a wheelchair)?: 2  Need to walk in hospital room?: 2  Climbing 3-5 steps with a railing?: 1  Total Score: 11    AM-PAC Raw Score CMS G-Code Modifier Level of Impairment Assistance   6 % Total / Unable   7 - 9 CM 80 - 100% Maximal Assist   10 - 14 CL 60 - 80% Moderate Assist   15 - 19 CK 40 - 60% Moderate Assist   20 - 22 CJ 20 - 40% Minimal Assist   23 CI 1-20% SBA / CGA   24 CH 0% Independent/ Mod I     Patient left up in chair with all lines intact, call button in reach and RN notified.    Assessment:  Edie Hays is a 72 y.o. female with a medical diagnosis of Acute on chronic respiratory failure with hypoxia and presents with decreased strength, endurance, balance, transfers, and gait distance. Pt progressing towards goals, but not at PLOF. Pt tolerated session well with increase in SOB, needing seated rest breaks to return to baseline.  Pt is improving with therapy evidenced by decreased assist with bed mobility, transfers, and ambulating for the first time with RW.  Pt would benefit from continued PT services to improve overall functional mobility. Recommend d/c to Skilled nursing facility to maximize functional independence and safety with decreased caregiver support.      Rehab identified problem list/impairments: Rehab identified problem list/impairments: weakness, impaired endurance, impaired self care skills, impaired functional mobilty, gait instability, impaired balance    Rehab potential is good.    Activity tolerance: Good    Discharge recommendations: Discharge Facility/Level Of Care Needs: nursing facility, skilled     Barriers to discharge: Barriers to Discharge: Decreased caregiver support (lives with mother)    Equipment recommendations: Equipment Needed After Discharge:  (TBD at next level of care )     GOALS:    Physical Therapy Goals        Problem: Physical Therapy Goal    Goal Priority Disciplines Outcome Goal Variances Interventions   Physical Therapy Goal     PT/OT, PT Ongoing (interventions implemented as appropriate)     Description:  Goals to be met by: 10/23/2017    Patient will increase functional independence with mobility by performin. Supine to sit with MInimal Assistance - not met  2. Sit to stand transfer with Moderate Assistance - not met  3. Gait  x 50 feet with Moderate Assistance using Rolling Walker. - not met  4. Sitting at edge of bed x8 minutes with Minimal Assistance - met 10/19  5. Stand for 5 minutes with Minimal Assistance using Rolling Walker. - met 10/19  6. Lower extremity exercise program x15 reps per handout, with independence - not met                               PLAN:    Patient to be seen 5 x/week  to address the above listed problems via gait training, therapeutic activities, therapeutic exercises, neuromuscular re-education  Plan of Care expires: 17  Plan of Care reviewed with: patient         Michelle Shaffer, PT  10/19/2017

## 2017-10-19 NOTE — SUBJECTIVE & OBJECTIVE
Interval History:  Elevated BG throughout course. Have escalated insulin therapy without improvement. Patient with no new complaints. SOB at baseline. No lower extremity edema.     Review of Systems   Constitutional: Negative for chills and fever.   HENT: Negative for congestion and sore throat.    Eyes: Negative for photophobia and visual disturbance.   Respiratory: Positive for shortness of breath (Improved). Negative for cough.    Cardiovascular: Negative for chest pain, palpitations and leg swelling.   Gastrointestinal: Negative for abdominal pain, constipation, diarrhea, nausea and vomiting.   Genitourinary:        Catheterized   Musculoskeletal: Negative for arthralgias and myalgias.   Skin: Negative for rash and wound.   Neurological: Negative for syncope and headaches.   Psychiatric/Behavioral: Negative for agitation and confusion.     Objective:     Vital Signs (Most Recent):  Temp: 97.2 °F (36.2 °C) (10/19/17 0900)  Pulse: 80 (10/19/17 1241)  Resp: 16 (10/19/17 1241)  BP: 129/74 (10/19/17 1100)  SpO2: 96 % (10/19/17 1241) Vital Signs (24h Range):  Temp:  [97.2 °F (36.2 °C)-98.8 °F (37.1 °C)] 97.2 °F (36.2 °C)  Pulse:  [78-91] 80  Resp:  [12-24] 16  SpO2:  [92 %-99 %] 96 %  BP: (112-133)/(53-76) 129/74     Weight: 85.5 kg (188 lb 7.9 oz)  Body mass index is 31.37 kg/m².    Physical Exam   Constitutional: She is oriented to person, place, and time. No distress.   Thin, elderly female. Somewhat somnolent.    HENT:   Head: Normocephalic and atraumatic.   No NG tube in place   Eyes: EOM are normal. Pupils are equal, round, and reactive to light.   Neck: Normal range of motion. Neck supple. No JVD present.   Cardiovascular: Normal rate and regular rhythm.    Murmur (3/6 systolic murmur best heard over parasternal area) heard.  Pulmonary/Chest: Effort normal. Wheezes: inspiratory and expiratory.   Diffuse fine inspiratory crackles  No wheezes  On NC 4 L   Abdominal: Soft. Bowel sounds are normal. She exhibits no  distension. There is no tenderness. There is no guarding.   Genitourinary:   Genitourinary Comments: Peoples in place draining clear urine   Musculoskeletal: She exhibits no edema, tenderness or deformity.   S/p L mastectomy (with lymphectomy)   Neurological: She is alert and oriented to person, place, and time. No cranial nerve deficit.   Patient was unable to talk due to BiPAP and resp distress   Skin: Skin is warm and dry. She is not diaphoretic.   Trophic changes to BLE suggesting recent edema but none present today.    Psychiatric: She has a normal mood and affect.   Somnolent        Significant Labs:   ABGs: No results for input(s): PH, PCO2, HCO3, POCSATURATED, BE, TOTALHB, COHB, METHB, O2HB, POCFIO2 in the last 48 hours.  Blood Culture: No results for input(s): LABBLOO in the last 48 hours.  CBC:     Recent Labs  Lab 10/18/17  0735 10/19/17  0530   WBC 22.17* 23.74*   HGB 9.1* 8.5*   HCT 28.2* 25.6*    264     CMP:     Recent Labs  Lab 10/18/17  1330 10/18/17  1342 10/18/17  1941 10/19/17  0530   * 146*  146* 142 144   K 4.6 4.7  4.7 4.5 4.3    105  105 103 104   CO2 25 26  26 25 27   * 489*  489* 433* 383*   * 113*  113* 111* 107*   CREATININE 3.0* 3.1*  3.1* 2.9* 2.8*   CALCIUM 8.5* 8.4*  8.4* 8.0* 8.1*   PROT 6.8  --   --  6.5   ALBUMIN 2.8*  --   --  2.8*   BILITOT 0.6  --   --  0.5   ALKPHOS 260*  --   --  315*   AST 43*  --   --  57*   ALT 49*  --   --  61*   ANIONGAP 15 15  15 14 13   EGFRNONAA 15.2* 14.6*  14.6* 15.8* 16.5*     Respiratory Culture: No results for input(s): GSRESP, RESPIRATORYC in the last 48 hours.  Urine Culture: No results for input(s): LABURIN in the last 48 hours.  Urine Studies:     Recent Labs  Lab 10/18/17  1505   COLORU Yellow   APPEARANCEUA Cloudy*   PHUR 5.0   SPECGRAV 1.015   PROTEINUA 2+*   GLUCUA 2+*   KETONESU Negative   BILIRUBINUA Negative   OCCULTUA 2+*   NITRITE Negative   UROBILINOGEN Negative   LEUKOCYTESUR Trace*   RBCUA  15*   WBCUA 12*   BACTERIA Occasional   SQUAMEPITHEL 2   HYALINECASTS 6*

## 2017-10-19 NOTE — PLAN OF CARE
Problem: Occupational Therapy Goal  Goal: Occupational Therapy Goal  Goals to be met by: 11/08/2017    Patient will increase functional independence with ADLs by performing:    UE Dressing with Supervision.  LE Dressing with Minimal Assistance.  Grooming while seated with Supervision.  Toileting from bedside commode with Minimal Assistance for hygiene and clothing management.                         -goal revised: from toilet with Moderate Assistance   Sitting at edge of bed or bedside chair  x30 minutes with Supervision.  Rolling to Bilateral with Modified [place].   Supine to sit with Supervision.  Stand pivot transfers with Supervision.  Toilet transfer to bedside commode with Supervision.                            - toilet      Outcome: Ongoing (interventions implemented as appropriate)  Goals revised and updated. Remain appropriate    Comments: Cont OT POC

## 2017-10-19 NOTE — PLAN OF CARE
Problem: Physical Therapy Goal  Goal: Physical Therapy Goal  Goals to be met by: 10/23/2017    Patient will increase functional independence with mobility by performin. Supine to sit with MInimal Assistance - not met  2. Sit to stand transfer with Moderate Assistance - not met  3. Gait  x 50 feet with Moderate Assistance using Rolling Walker. - not met  4. Sitting at edge of bed x8 minutes with Minimal Assistance - met 10/19  5. Stand for 5 minutes with Minimal Assistance using Rolling Walker. - met 10/19  6. Lower extremity exercise program x15 reps per handout, with independence - not met             Outcome: Ongoing (interventions implemented as appropriate)  Treatment completed and some goals met. Goals appropriate.

## 2017-10-19 NOTE — ASSESSMENT & PLAN NOTE
-patient had a repeat echo on 10/7/17 which showed LVEF 60-65%, PASP 70-75, moderate TR  -Venous US R LE was negative for DVT  -patient was treated at OSH with Bumex 2mg IV x1 day and albumin 1 hour prior  -cardiology was consulted; they reviewed CT and CXR images and due to these findings and bump in Cr after diuresis, they felt that the resp failure was more like PNA in etiology.    -patient was subsequently transferred here for higher level of care/pulmonary lavage    -S/p significant IV diuresis, now with elevated creatinine. Stopping diuresis now as feel patient's lung exam is secondary to chronic disease and not fluid overload.   Cr improved today

## 2017-10-19 NOTE — PLAN OF CARE
Problem: Patient Care Overview  Goal: Plan of Care Review  Outcome: Ongoing (interventions implemented as appropriate)  Pt free of falls/traumas/injuries. Skin remains clean, dry, and intact. Pt restarted on insulin gtt, following nomogram.  Pt re-educated on importance of measuring accurate intake and out put; pt verbalized and demonstrates understanding. Reviewed plan of care with pt; and pt verbalized understanding.  Pt VSS in no distress will continue to monitor.

## 2017-10-19 NOTE — ASSESSMENT & PLAN NOTE
-patients baseline Cr appears to be around 1.7, increased to 3.1 yesterday. Urine lytes sent pre-renal. Peoples catheter in place, not obstructed  -Suspect 2/2 aggressive diuresis.   -will closely monitor with daily MPs

## 2017-10-19 NOTE — CARE UPDATE
Patient blood glucose at 21:00 is 434. Blood glucose range throughout the day (378-489). Per discussion with primary team, will not start insulin infusion at this point. Patient also has JOSE (Cr 2.9).    Plan:  - Give 5 units of SSI.  - Re-check blood glucose again in 4 hours.        Saleem Stearns MD  [facility]  [facility]  Pager # [phone]  SpectraLink # [phone]

## 2017-10-19 NOTE — PROGRESS NOTES
Notified Dr. Nevarez pt's cbg is 404 after administration of 9 units of IV insulin. MD will write order for insulin. Will continue to monitor.

## 2017-10-19 NOTE — PLAN OF CARE
Problem: SLP Goal  Goal: SLP Goal  Speech Language Pathology Goals  Goals expected to be met by 10/25    1. Pt will tolerate diet of nectar thick liquids and dental soft solids without overt s/s of aspiration   2. Pt will participate in trials of thin liquids within speech therapy sessions to help determine least restrictive diet     3. Pt will participate in dysphagia exercises x10 to enhance swallow function    Outcome: Outcome(s) achieved Date Met: 10/19/17    Pt with good progress towards goals. Ready to advance diet to dental soft solids and thin liquids. No further speech follow up warranted at this time.    Danyell Eldridge Ms, CCC-SLP  Speech Language Pathologist  Pager: [phone]  Date 10/19/2017

## 2017-10-19 NOTE — PLAN OF CARE
Problem: Patient Care Overview  Goal: Plan of Care Review  Outcome: Ongoing (interventions implemented as appropriate)  Plan of care discussed with pt. Prednisone PO BID for PNA. Plan to wean off steroids. Plan for outpatient taper w/ pulmonology and possible lung biopsy. BS checks Q4H blood sugars remain >400 during shift. Orders to give aspart insulin per MD on call. VSS & NADN. Pt denies CP, SOB, or pain/discomfort at this time.Pt free of injuries this shift.  All questions addressed.  Will continue to monitor.

## 2017-10-20 PROBLEM — D50.9 MICROCYTIC ANEMIA: Status: ACTIVE | Noted: 2017-01-01

## 2017-10-20 PROBLEM — E11.01 HYPERGLYCEMIC HYPEROSMOLAR NONKETOTIC COMA: Status: RESOLVED | Noted: 2017-01-01 | Resolved: 2017-01-01

## 2017-10-20 PROBLEM — I20.89 ANGINAL CHEST PAIN AT REST: Status: ACTIVE | Noted: 2017-01-01

## 2017-10-20 NOTE — PLAN OF CARE
JEFFREY spoke to [place] with [facility] ([phone]).  They have accepted pt and her dtr has signed paperwork.  They can accept pt Monday, if pt is stable for dc.  JEFFREY updated CM.    Bernarda Martell, hospitalsW     [phone]  Critical Care (MICU)

## 2017-10-20 NOTE — ASSESSMENT & PLAN NOTE
Drip stopped last ngith.     Given 50 units detemir at 0100 and 20TIDWM. Will monitor and increase aspart as needed. Will give 55 detemir tonight.

## 2017-10-20 NOTE — NURSING
Notified MD Stearns patient complaining of 7/10 epigastric pain. Patient says pain is similar to MI in past. STAT troponin collected, EKG preformed. Also notified MD patient's potassium 3.5, MD stated he would order replacement. Will continue to monitor and complete orders as they come in.

## 2017-10-20 NOTE — SIGNIFICANT EVENT
10/19/17 21:00  Called by RN to bedside to assess patient due to epigastric pain. Patient stated that this pain is similar to her previous NSTEMI pain, but it's worse. No SOB, palpitation, N/V, diarrhea or constipation (last BM was today). Patient has known history of CAD (3 vessels) with negative PET stress done in 6/2017. On exam, she was in pain, alert and oriented. Normal S1 and S2 with a systolic murmur. Lungs exam showed bilateral fine crackles (mainly bibasilar). Exam is unchanged compared to primary team's note today. Abdomen was soft, non-distended with no tenderness.    Plan:  - GI vs cardiac pain.  - STAT EKG and troponin.  - Will administer nitro and monitor for pain relief.        UPDATE  10/19/17 21:30  EKG showed mild changes (T-wave inversion in V3 and V4). Troponin 0.119 (compared to 0.428, about 5 days ago). No improvement with first dose of nitro, but significant improvement with 2 subsequent doses (initially 7/10, then 3/10 with 2nd nitro, and 0/10 with 3rd nitro).    Plan:  - Repeat EKG.  - Continue to trend troponin Q6h until trending down or flat.  - Cardiology consult.          Saleem Stearns MD  Hospital Medicine [facility]  Pager # [phone]  SpectraLink # [phone]

## 2017-10-20 NOTE — ASSESSMENT & PLAN NOTE
Improved, now comfortable on NC oxygen, will monitor  -Patient has been diuresed extensively, holding fluids if possible to keep lungs dry

## 2017-10-20 NOTE — ASSESSMENT & PLAN NOTE
-labs consistent with iron deficiency. Small drop today, will repeat this afternoon to rule out bleed  -continue ferrous sulfate 325  -patient required 2U transfusion at OSH  -will get CBC q daily and trend

## 2017-10-20 NOTE — SUBJECTIVE & OBJECTIVE
Past Medical History:   Diagnosis Date    BOOP (bronchiolitis obliterans with organizing pneumonia) 5/2/2017    Overview:  Diagnosed clinically via response to steroids    Breast cancer     Chronic diastolic heart failure     Coronary artery disease involving native coronary artery of native heart without angina pectoris 5/30/2017    Encounter for blood transfusion     Essential hypertension 5/31/2017    HLD (hyperlipidemia) 5/31/2017    NSTEMI (non-ST elevated myocardial infarction) 5/31/2017    Type 2 diabetes mellitus with diabetic polyneuropathy, with long-term current use of insulin 5/31/2017    Last Assessment & Plan:  History of type 2 diabetes managed on basal bolus insulin at home.  She is currently receiving Lantus 35 units HS and Humalog 5 units with meals.  Blood glucose is stable ranging between 167-200.  We will continue to monitor as she is still receiving steroids making her glucose control difficult.This morning, patient had an accucheck documented at 60 which improved after o    Type 2 diabetes mellitus with stage 3 chronic kidney disease, with long-term current use of insulin 10/13/2017       Past Surgical History:   Procedure Laterality Date    MASTECTOMY         Review of patient's allergies indicates:   Allergen Reactions    Codeine     Lortab [hydrocodone-acetaminophen] Nausea Only    Penicillins        No current facility-administered medications on file prior to encounter.      Current Outpatient Prescriptions on File Prior to Encounter   Medication Sig    amlodipine (NORVASC) 10 MG tablet Take 10 mg by mouth once daily.    buPROPion (WELLBUTRIN SR) 100 MG TBSR 12 hr tablet Take 100 mg by mouth 2 (two) times daily.    carvedilol (COREG) 12.5 MG tablet Take 1 tablet (12.5 mg total) by mouth 2 (two) times daily.    clopidogrel (PLAVIX) 75 mg tablet Take 1 tablet (75 mg total) by mouth once daily.    duloxetine (CYMBALTA) 60 MG capsule Take 60 mg by mouth once daily.     ferrous sulfate 325 (65 FE) MG EC tablet Take 325 mg by mouth once daily.    furosemide (LASIX) 20 MG tablet Take 20 mg by mouth 2 (two) times daily.    gabapentin (NEURONTIN) 300 MG capsule Take 300 mg by mouth 3 (three) times daily.    insulin aspart (NOVOLOG) 100 unit/mL injection Inject 15 Units into the skin 3 (three) times daily before meals.    insulin glargine (LANTUS) 100 unit/mL injection Inject 50 Units into the skin every evening.    nitroGLYCERIN (NITROSTAT) 0.4 MG SL tablet Place 1 tablet (0.4 mg total) under the tongue every 5 (five) minutes as needed for Chest pain.    rosuvastatin (CRESTOR) 20 MG tablet Take 1 tablet (20 mg total) by mouth every evening.    aspirin (ECOTRIN) 81 MG EC tablet Take 81 mg by mouth once daily.    predniSONE (DELTASONE) 10 MG tablet Take 20 mg daily for 2 days, then 10 mg daily for 2 days, then 5 mg daily for 2 days     Family History     None        Social History Main Topics    Smoking status: Never Smoker    Smokeless tobacco: Not on file    Alcohol use Not on file    Drug use: Unknown    Sexual activity: Not on file     Review of Systems   Constitution: Negative.   HENT: Negative.    Eyes: Negative.    Cardiovascular: Positive for chest pain and dyspnea on exertion. Negative for leg swelling, near-syncope, orthopnea, palpitations, paroxysmal nocturnal dyspnea and syncope.   Respiratory: Positive for shortness of breath. Negative for wheezing.    Gastrointestinal: Positive for abdominal pain. Negative for constipation, diarrhea, heartburn, nausea and vomiting.   Genitourinary: Negative.    Neurological: Negative.      Objective:     Vital Signs (Most Recent):  Temp: 98.2 °F (36.8 °C) (10/19/17 1930)  Pulse: 86 (10/20/17 0818)  Resp: 18 (10/20/17 0818)  BP: 129/71 (10/20/17 0818)  SpO2: 95 % (10/20/17 0818) Vital Signs (24h Range):  Temp:  [98.1 °F (36.7 °C)-98.2 °F (36.8 °C)] 98.2 °F (36.8 °C)  Pulse:  [78-90] 86  Resp:  [12-23] 18  SpO2:  [94 %-100 %] 95  %  BP: (107-136)/(58-78) 129/71     Weight: 85.5 kg (188 lb 7.9 oz)  Body mass index is 31.37 kg/m².    SpO2: 95 %  O2 Device (Oxygen Therapy): nasal cannula      Intake/Output Summary (Last 24 hours) at 10/20/17 0928  Last data filed at 10/20/17 0500   Gross per 24 hour   Intake             2036 ml   Output             1750 ml   Net              286 ml       Lines/Drains/Airways     Peripherally Inserted Central Catheter Line                 PICC Triple Lumen 10/14/17 left brachial 6 days                Physical Exam   Constitutional: She is oriented to person, place, and time.   HENT:   Head: Normocephalic and atraumatic.   Eyes: Conjunctivae and EOM are normal. Pupils are equal, round, and reactive to light.   Neck: Normal range of motion. Neck supple. No JVD present.   Cardiovascular: Normal rate and regular rhythm.    Murmur ( L- parasternal loudest at fourth intercoastal space, grade 3/6 ) heard.  Pulmonary/Chest: No respiratory distress (3L NC ). She has decreased breath sounds. She has rhonchi. She has rales in the right lower field and the left lower field.   Abdominal: Soft. Bowel sounds are normal. She exhibits no distension. There is no tenderness.   Musculoskeletal: Normal range of motion. She exhibits no edema.   Neurological: She is alert and oriented to person, place, and time.   Somnolent        Significant Labs:   CMP   Recent Labs  Lab 10/18/17  1330  10/19/17  0530  10/19/17  2328 10/20/17  0114 10/20/17  0540   *  < > 144  < > 139 139 138  138   K 4.6  < > 4.3  < > 4.0 4.5 4.4  4.4     < > 104  < > 102 102 102  102   CO2 25  < > 27  < > 24 23 29  29   *  < > 383*  < > 158* 142* 201*  201*   *  < > 107*  < > 88* 92* 91*  91*   CREATININE 3.0*  < > 2.8*  < > 2.2* 2.2* 2.3*  2.3*   CALCIUM 8.5*  < > 8.1*  < > 7.8* 7.7* 7.7*  7.7*   PROT 6.8  --  6.5  --   --   --  6.0   ALBUMIN 2.8*  --  2.8*  --   --   --  2.8*   BILITOT 0.6  --  0.5  --   --   --  0.5   ALKPHOS  260*  --  315*  --   --   --  303*   AST 43*  --  57*  --   --   --  57*   ALT 49*  --  61*  --   --   --  62*   ANIONGAP 15  < > 13  < > 13 14 7*  7*   ESTGFRAFRICA 17.5*  < > 19.0*  < > 25.4* 25.4* 24.1*  24.1*   EGFRNONAA 15.2*  < > 16.5*  < > 22.1* 22.1* 20.9*  20.9*   < > = values in this interval not displayed., CBC   Recent Labs  Lab 10/19/17  0530 10/20/17  0540   WBC 23.74* 25.74*   HGB 8.5* 7.8*   HCT 25.6* 23.3*    302    and Troponin   Recent Labs  Lab 10/19/17  2104 10/20/17  0114 10/20/17  0540   TROPONINI 0.119* 0.111* 0.117*       Significant Imaging: EKG: T wave inversions

## 2017-10-20 NOTE — PROGRESS NOTES
[facility] Hospital Medicine  Progress Note    Patient Name: Edie Hays  MRN: [id number]  Patient Class: IP- Inpatient   Admission Date: 10/13/2017  Length of Stay: 7 days  Attending Physician: Daisha Pantoja MD  Primary Care Provider: Primary Doctor [facility] Medicine Team: [facility] HOSP MED 3 Micheal Nevarez MD    Subjective:     Principal Problem:Anginal chest pain at rest    HPI:  73 y/o female with past medical history of cryptogenic pneumonia (home oxygen; respondent to steroids), HTN, HLP, Type 2 diabetes (on insulin) with polyneuropathy and CKD stage 3 and CAD was admitted to [facility] with acute hypoxic respiratory failure requiring initial hospitalization in ICU and intubated.  Patient has been transferred to [facility] for evaluation of acute hypoxic respiratory failure and pulmonary consult.     Patients cytogenic PNA was first diagnosed in 5/2017 at [facility]. Patient was placed on a steroid taper from 60mg to 30mg daily, continue taper 10mg every 3 days, 20mg daily.    Patient was originally admitted to [facility] on 10/7/17 with septic shock. Patient required intubation with ICU level care. She was treated with zyvox, levoquin and maxipime.  She was extubated and required intermittent BiPAP and oxygen for her respiratory failure. Cardiology had been following the patient at [facility].  Sputum culture with normal growth. Urine culture negative.  BCx X2 were NGTD. Attempts have been made to diuresis patient with IV lasix but resulted in worsening of renal function so have backed off diuresis and trying to use IV Diuril without much success. Due to this, patient was transferred to [facility] for higher level of care and Pulmonary.    Of note, patient has 3 vessel CAD with negative PET stress for perfusion defects done here at [facility] so CABG was not indicated     Interval History:  Was on insulin drip yesterday, intially 4.5 u/hr then increased to 8 after no  improvement in 1 hour. Stopped after blood glucose down below 200. Given 50 units detemir after stopping.     Poorly characterized epigastric pain without radiation last night. No relation to exertion, constant until 2nd and 3rd doses of NTG which resolved pain. No N/V, SOB. EKG with t wave inversion in V2, V3. Troponins trended, elevated but mostly stable at .1... Cardiology following    Review of Systems   Constitutional: Negative for chills and fever.   HENT: Negative for congestion and sore throat.    Eyes: Negative for photophobia and visual disturbance.   Respiratory: Positive for shortness of breath (Improved). Negative for cough.    Cardiovascular: Negative for chest pain, palpitations and leg swelling.   Gastrointestinal: Positive for abdominal pain. Negative for constipation, diarrhea, nausea and vomiting.   Genitourinary:        Peoples removed   Musculoskeletal: Negative for arthralgias and myalgias.   Skin: Negative for rash and wound.   Neurological: Negative for syncope and headaches.   Psychiatric/Behavioral: Negative for agitation and confusion.     Objective:     Vital Signs (Most Recent):  Temp: 97.9 °F (36.6 °C) (10/20/17 0944)  Pulse: 74 (10/20/17 1000)  Resp: 20 (10/20/17 1000)  BP: 129/71 (10/20/17 0818)  SpO2: 97 % (10/20/17 1000) Vital Signs (24h Range):  Temp:  [97.9 °F (36.6 °C)-98.2 °F (36.8 °C)] 97.9 °F (36.6 °C)  Pulse:  [74-90] 74  Resp:  [12-23] 20  SpO2:  [94 %-100 %] 97 %  BP: (107-136)/(58-78) 129/71     Weight: 85.5 kg (188 lb 7.9 oz)  Body mass index is 31.37 kg/m².    Physical Exam   Constitutional: She is oriented to person, place, and time. No distress.   Thin, elderly female. Somewhat somnolent.    HENT:   Head: Normocephalic and atraumatic.   Eyes: Conjunctivae and EOM are normal.   Neck: Normal range of motion. Neck supple. No JVD present.   Cardiovascular: Normal rate and regular rhythm.    Murmur (3/6 systolic murmur best heard over parasternal area)  heard.  Pulmonary/Chest: Effort normal.   Diffuse fine inspiratory crackles  No wheezes   Abdominal: Soft. Bowel sounds are normal. She exhibits no distension. There is no guarding.   Mild epigastric tenderness   Genitourinary:   Genitourinary Comments: Peoples in place draining clear urine   Musculoskeletal: She exhibits no edema, tenderness or deformity.   S/p L mastectomy (with lymphectomy)   Neurological: She is alert and oriented to person, place, and time. No cranial nerve deficit.   Patient was unable to talk due to BiPAP and resp distress   Skin: Skin is warm and dry. She is not diaphoretic.   Trophic changes to BLE suggesting recent edema but none present today.    Psychiatric: She has a normal mood and affect.   Somnolent        Significant Labs:   ABGs: No results for input(s): PH, PCO2, HCO3, POCSATURATED, BE, TOTALHB, COHB, METHB, O2HB, POCFIO2 in the last 48 hours.  Blood Culture: No results for input(s): LABBLOO in the last 48 hours.  CBC:     Recent Labs  Lab 10/19/17  0530 10/20/17  0540   WBC 23.74* 25.74*   HGB 8.5* 7.8*   HCT 25.6* 23.3*    302     CMP:     Recent Labs  Lab 10/18/17  1330  10/19/17  0530  10/19/17  2328 10/20/17  0114 10/20/17  0540   *  < > 144  < > 139 139 138  138   K 4.6  < > 4.3  < > 4.0 4.5 4.4  4.4     < > 104  < > 102 102 102  102   CO2 25  < > 27  < > 24 23 29  29   *  < > 383*  < > 158* 142* 201*  201*   *  < > 107*  < > 88* 92* 91*  91*   CREATININE 3.0*  < > 2.8*  < > 2.2* 2.2* 2.3*  2.3*   CALCIUM 8.5*  < > 8.1*  < > 7.8* 7.7* 7.7*  7.7*   PROT 6.8  --  6.5  --   --   --  6.0   ALBUMIN 2.8*  --  2.8*  --   --   --  2.8*   BILITOT 0.6  --  0.5  --   --   --  0.5   ALKPHOS 260*  --  315*  --   --   --  303*   AST 43*  --  57*  --   --   --  57*   ALT 49*  --  61*  --   --   --  62*   ANIONGAP 15  < > 13  < > 13 14 7*  7*   EGFRNONAA 15.2*  < > 16.5*  < > 22.1* 22.1* 20.9*  20.9*   < > = values in this interval not  displayed.  Respiratory Culture: No results for input(s): GSRESP, RESPIRATORYC in the last 48 hours.  Urine Culture: No results for input(s): LABURIN in the last 48 hours.  Urine Studies:     Recent Labs  Lab 10/18/17  1505   COLORU Yellow   APPEARANCEUA Cloudy*   PHUR 5.0   SPECGRAV 1.015   PROTEINUA 2+*   GLUCUA 2+*   KETONESU Negative   BILIRUBINUA Negative   OCCULTUA 2+*   NITRITE Negative   UROBILINOGEN Negative   LEUKOCYTESUR Trace*   RBCUA 15*   WBCUA 12*   BACTERIA Occasional   SQUAMEPITHEL 2   HYALINECASTS 6*         Assessment/Plan:      Urinary retention    Peoples removed yesterday, able to void  Will monitor          Type 2 diabetes mellitus with hyperglycemia, with long-term current use of insulin    Drip stopped last ngith.     Given 50 units detemir at 0100 and 20TIDWM. Will monitor and increase aspart as needed. Will give 55 detemir tonight.             Hypernatremia    Improved today. Daily BMPs        Acute on chronic respiratory failure with hypoxia    Improved, now comfortable on NC oxygen, will monitor  -Patient has been diuresed extensively, holding fluids if possible to keep lungs dry          Type 2 diabetes mellitus with stage 3 chronic kidney disease, with long-term current use of insulin    Cr stable in low 2s.   -Suspect 2/2 aggressive diuresis.   -will closely monitor with daily BMPs          Acute on chronic diastolic heart failure    -patient had a repeat echo on 10/7/17 which showed LVEF 60-65%, PASP 70-75, moderate TR  -Venous US R LE was negative for DVT  -patient was treated at OSH with Bumex 2mg IV x1 day and albumin 1 hour prior  -cardiology was consulted; they reviewed CT and CXR images and due to these findings and bump in Cr after diuresis, they felt that the resp failure was more like PNA in etiology.    -patient was subsequently transferred here for higher level of care/pulmonary lavage    -S/p significant IV diuresis, now with elevated creatinine. Diuresis stopped as feel  patient's lung exam is secondary to chronic disease and not fluid overload.   Cr stable          Microcytic anemia    -labs consistent with iron deficiency. Small drop today, will repeat this afternoon to rule out bleed  -continue ferrous sulfate 325  -patient required 2U transfusion at OSH  -will get CBC q daily and trend           HLD (hyperlipidemia)    -continue with crestor 20mg        Essential hypertension    on amlodipine 10mg  Will monitor with BP additions today and adjust as needed.           Coronary artery disease involving native coronary artery of native heart without angina pectoris    Acute epigastric pain overnight  Worked up for cardiac cause with EKG and serial troponins  Will trend until troponins fall  Repeat EKG ordered per cardiology recs    Appreciate cardiology recs  Starting BBlocker for goal HR 50-60  Continuing ASA, statin, Plavix  Starting imdur  Will monitor BP and titrate          VTE Risk Mitigation         Ordered     heparin (porcine) injection 5,000 Units  Every 8 hours     Route:  Subcutaneous        10/16/17 1320     Place sequential compression device  Until discontinued      10/13/17 2324     Medium Risk of VTE  Once      10/13/17 2316        Dispo: Plan for SNF pending control of BG, evaluation of epigastric pain, placement    Micheal Nevarez MD   Internal Medicine PGY-1  [phone]

## 2017-10-20 NOTE — NURSING
Administered 0.4mg nitro sublingual at 2126, patient stated no change in epigastric pain 7/10. Administered second dose 2140, patient stated some relief in pain now at 3/10. Administered third dose of nitro at 2149, patient stated full relief of pain 0/10.  MD Stearns notified, informed RN he would consult cardiology for recommendations, will continue to monitor.

## 2017-10-20 NOTE — CONSULTS
[facility]  Cardiology  Consult Note    Patient Name: Edie Hays  MRN: [id number]  Admission Date: 10/13/2017  Hospital Length of Stay: 7 days  Code Status: Full Code   Attending Provider: Daisha Pantoja MD   Consulting Provider: Annie Marshall MD  Primary Care Physician: Primary Doctor No  Principal Problem:Anginal chest pain at rest    Patient information was obtained from patient and ER records.     Inpatient consult to Cardiology  Consult performed by: ANINE MARSHALL  Consult ordered by: KENNETH NICOLE  Reason for consult: CP w/ EKG changes         Subjective:     Chief Complaint:  Chest Pain w/ EKG changes      HPI:   Ms. Hays is a 73 y/o female with a history significant for cryptogenic pneumonia on home oxygen, essential HTN, HLP, Type 2 diabetes with polyneuropathy and CKD stage 3 (home insulin therapy), HfpEF (EF 60%) and 3 vessel CAD identified by [facility] (5/29/17) with diffuse LAD disease without focal stenosis and mild-moderate disease in Cx, with PET scan (6/1/2017) showing flows in RCA and Cx above ischemic threshold but overall CFR in LAD showing a base to apical gradient likely contributing to ischemia.  Given that the LAD is more consistent with multiple tandem lesions causing ischemia rather than a focal stenosis, medical management was recommended.      Patient was originally admitted to [facility] with acute hypoxic respiratory failure requiring initial hospitalization in ICU and intubated. Patient now extubated and on floor but requring oxygen for her respiratory failure and transferred to [facility] for further care.     Overnight the patient experienced non-exertional epigastric pain, 10/10 crushing pain, similar in presentation to her NSTEMI, relieved by NTG *3 w/ EKG significant for T wave abnormalities. Troponin 0.117 (10/20/17 @ 0540), 0.111 (10/20/17 @ 0114).        Of note patient has not been on any anti-anginal medications during her current  admission.      Past Medical History:   Diagnosis Date    BOOP (bronchiolitis obliterans with organizing pneumonia) 5/2/2017    Overview:  Diagnosed clinically via response to steroids    Breast cancer     Chronic diastolic heart failure     Coronary artery disease involving native coronary artery of native heart without angina pectoris 5/30/2017    Encounter for blood transfusion     Essential hypertension 5/31/2017    HLD (hyperlipidemia) 5/31/2017    NSTEMI (non-ST elevated myocardial infarction) 5/31/2017    Type 2 diabetes mellitus with diabetic polyneuropathy, with long-term current use of insulin 5/31/2017    Last Assessment & Plan:  History of type 2 diabetes managed on basal bolus insulin at home.  She is currently receiving Lantus 35 units HS and Humalog 5 units with meals.  Blood glucose is stable ranging between 167-200.  We will continue to monitor as she is still receiving steroids making her glucose control difficult.This morning, patient had an accucheck documented at 60 which improved after o    Type 2 diabetes mellitus with stage 3 chronic kidney disease, with long-term current use of insulin 10/13/2017       Past Surgical History:   Procedure Laterality Date    MASTECTOMY         Review of patient's allergies indicates:   Allergen Reactions    Codeine     Lortab [hydrocodone-acetaminophen] Nausea Only    Penicillins        No current facility-administered medications on file prior to encounter.      Current Outpatient Prescriptions on File Prior to Encounter   Medication Sig    amlodipine (NORVASC) 10 MG tablet Take 10 mg by mouth once daily.    buPROPion (WELLBUTRIN SR) 100 MG TBSR 12 hr tablet Take 100 mg by mouth 2 (two) times daily.    carvedilol (COREG) 12.5 MG tablet Take 1 tablet (12.5 mg total) by mouth 2 (two) times daily.    clopidogrel (PLAVIX) 75 mg tablet Take 1 tablet (75 mg total) by mouth once daily.    duloxetine (CYMBALTA) 60 MG capsule Take 60 mg by mouth once  daily.    ferrous sulfate 325 (65 FE) MG EC tablet Take 325 mg by mouth once daily.    furosemide (LASIX) 20 MG tablet Take 20 mg by mouth 2 (two) times daily.    gabapentin (NEURONTIN) 300 MG capsule Take 300 mg by mouth 3 (three) times daily.    insulin aspart (NOVOLOG) 100 unit/mL injection Inject 15 Units into the skin 3 (three) times daily before meals.    insulin glargine (LANTUS) 100 unit/mL injection Inject 50 Units into the skin every evening.    nitroGLYCERIN (NITROSTAT) 0.4 MG SL tablet Place 1 tablet (0.4 mg total) under the tongue every 5 (five) minutes as needed for Chest pain.    rosuvastatin (CRESTOR) 20 MG tablet Take 1 tablet (20 mg total) by mouth every evening.    aspirin (ECOTRIN) 81 MG EC tablet Take 81 mg by mouth once daily.    predniSONE (DELTASONE) 10 MG tablet Take 20 mg daily for 2 days, then 10 mg daily for 2 days, then 5 mg daily for 2 days     Family History     None        Social History Main Topics    Smoking status: Never Smoker    Smokeless tobacco: Not on file    Alcohol use Not on file    Drug use: Unknown    Sexual activity: Not on file     Review of Systems   Constitution: Negative.   HENT: Negative.    Eyes: Negative.    Cardiovascular: Positive for chest pain and dyspnea on exertion. Negative for leg swelling, near-syncope, orthopnea, palpitations, paroxysmal nocturnal dyspnea and syncope.   Respiratory: Positive for shortness of breath. Negative for wheezing.    Gastrointestinal: Positive for abdominal pain. Negative for constipation, diarrhea, heartburn, nausea and vomiting.   Genitourinary: Negative.    Neurological: Negative.      Objective:     Vital Signs (Most Recent):  Temp: 98.2 °F (36.8 °C) (10/19/17 1930)  Pulse: 86 (10/20/17 0818)  Resp: 18 (10/20/17 0818)  BP: 129/71 (10/20/17 0818)  SpO2: 95 % (10/20/17 0818) Vital Signs (24h Range):  Temp:  [98.1 °F (36.7 °C)-98.2 °F (36.8 °C)] 98.2 °F (36.8 °C)  Pulse:  [78-90] 86  Resp:  [12-23] 18  SpO2:  [94  %-100 %] 95 %  BP: (107-136)/(58-78) 129/71     Weight: 85.5 kg (188 lb 7.9 oz)  Body mass index is 31.37 kg/m².    SpO2: 95 %  O2 Device (Oxygen Therapy): nasal cannula      Intake/Output Summary (Last 24 hours) at 10/20/17 0928  Last data filed at 10/20/17 0500   Gross per 24 hour   Intake             2036 ml   Output             1750 ml   Net              286 ml       Lines/Drains/Airways     Peripherally Inserted Central Catheter Line                 PICC Triple Lumen 10/14/17 left brachial 6 days                Physical Exam   Constitutional: She is oriented to person, place, and time.   HENT:   Head: Normocephalic and atraumatic.   Eyes: Conjunctivae and EOM are normal. Pupils are equal, round, and reactive to light.   Neck: Normal range of motion. Neck supple. No JVD present.   Cardiovascular: Normal rate and regular rhythm.    Murmur ( L- parasternal loudest at fourth intercoastal space, grade 3/6 ) heard.  Pulmonary/Chest: No respiratory distress (3L NC ). She has decreased breath sounds. She has rhonchi. She has rales in the right lower field and the left lower field.   Abdominal: Soft. Bowel sounds are normal. She exhibits no distension. There is no tenderness.   Musculoskeletal: Normal range of motion. She exhibits no edema.   Neurological: She is alert and oriented to person, place, and time.   Somnolent        Significant Labs:   CMP   Recent Labs  Lab 10/18/17  1330  10/19/17  0530  10/19/17  2328 10/20/17  0114 10/20/17  0540   *  < > 144  < > 139 139 138  138   K 4.6  < > 4.3  < > 4.0 4.5 4.4  4.4     < > 104  < > 102 102 102  102   CO2 25  < > 27  < > 24 23 29  29   *  < > 383*  < > 158* 142* 201*  201*   *  < > 107*  < > 88* 92* 91*  91*   CREATININE 3.0*  < > 2.8*  < > 2.2* 2.2* 2.3*  2.3*   CALCIUM 8.5*  < > 8.1*  < > 7.8* 7.7* 7.7*  7.7*   PROT 6.8  --  6.5  --   --   --  6.0   ALBUMIN 2.8*  --  2.8*  --   --   --  2.8*   BILITOT 0.6  --  0.5  --   --   --  0.5    ALKPHOS 260*  --  315*  --   --   --  303*   AST 43*  --  57*  --   --   --  57*   ALT 49*  --  61*  --   --   --  62*   ANIONGAP 15  < > 13  < > 13 14 7*  7*   ESTGFRAFRICA 17.5*  < > 19.0*  < > 25.4* 25.4* 24.1*  24.1*   EGFRNONAA 15.2*  < > 16.5*  < > 22.1* 22.1* 20.9*  20.9*   < > = values in this interval not displayed., CBC   Recent Labs  Lab 10/19/17  0530 10/20/17  0540   WBC 23.74* 25.74*   HGB 8.5* 7.8*   HCT 25.6* 23.3*    302    and Troponin   Recent Labs  Lab 10/19/17  2104 10/20/17  0114 10/20/17  0540   TROPONINI 0.119* 0.111* 0.117*       Significant Imaging: EKG: T wave inversions     Assessment and Plan:     * Anginal chest pain at rest    Overnight the patient experienced non-exertional epigastric pain, 10/10 crushing pain, similar in presentation to her NSTEMI, relieved by NTG *3 w/ EKG significant for T wave abnormalities. Troponin 0.117 (10/20/17 @ 0540), 0.111 (10/20/17 @ 0114). Patient not on any anti-anginal medications.         Prior Tests  3 vessel CAD identified by [facility] (5/29/17) with diffuse LAD disease without focal stenosis and mild-moderate disease in Cx, with PET scan (6/1/2017) showing flows in RCA and Cx above ischemic threshold but overall CFR in LAD showing a base to apical gradient likely contributing to ischemia.  Given that the LAD is more consistent with multiple tandem lesions causing ischemia rather than a focal stenosis, medical management was recommended.       - Patient with obstructive CAD (coronary artery disease), continue aspirin 81 mg once a day, statin (lipitor at bedtime), Start beta blocker (metoprolol 25 BID; titrate to achieve a HR of 50-60)    - Start antianginals that include beta blockers, long acting nitrates (Imdur; initial dose of 30mg titrate to 60mg as tolerated) and/or Ranexa (twice a day) and sublingual nitroglycerin as needed only for angina/chest discomfort    - Repeat EKG     - Patient needs medical therapy to include secondary  prevention strategies that include an antiplatelet agent, statin therapy with appropriate LDL control, appropriate BP control, tobacco control and blood sugar control.          Coronary artery disease involving native coronary artery of native heart without angina pectoris    - refer to anginal pain             VTE Risk Mitigation         Ordered     heparin (porcine) injection 5,000 Units  Every 8 hours     Route:  Subcutaneous        10/16/17 1320     Place sequential compression device  Until discontinued      10/13/17 2324     Medium Risk of VTE  Once      10/13/17 2316          Thank you for your consult. I will follow-up with patient. Please contact us if you have any additional questions.    Annie Cunha MD  Cardiology   [facility]

## 2017-10-20 NOTE — ASSESSMENT & PLAN NOTE
Overnight the patient experienced non-exertional epigastric pain, 10/10 crushing pain, similar in presentation to her NSTEMI, relieved by NTG *3 w/ EKG significant for T wave abnormalities. Troponin 0.117 (10/20/17 @ 0540), 0.111 (10/20/17 @ 0114). Patient not on any anti-anginal medications.         Prior Tests  3 vessel CAD identified by [facility] (5/29/17) with diffuse LAD disease without focal stenosis and mild-moderate disease in Cx, with PET scan (6/1/2017) showing flows in RCA and Cx above ischemic threshold but overall CFR in LAD showing a base to apical gradient likely contributing to ischemia.  Given that the LAD is more consistent with multiple tandem lesions causing ischemia rather than a focal stenosis, medical management was recommended.       - Patient with obstructive CAD (coronary artery disease), continue aspirin 81 mg once a day, statin (lipitor at bedtime), Start beta blocker (metoprolol 25 BID; titrate to achieve a HR of 50-60)    - Start antianginals that include beta blockers, long acting nitrates (Imdur; initial dose of 30mg titrate to 60mg as tolerated) and/or Ranexa (twice a day) and sublingual nitroglycerin as needed only for angina/chest discomfort    - Repeat EKG     - Patient needs medical therapy to include secondary prevention strategies that include an antiplatelet agent, statin therapy with appropriate LDL control, appropriate BP control, tobacco control and blood sugar control.

## 2017-10-20 NOTE — SUBJECTIVE & OBJECTIVE
Interval History:  Was on insulin drip yesterday, intially 4.5 u/hr then increased to 8 after no improvement in 1 hour. Stopped after blood glucose down below 200. Given 50 units detemir after stopping.     Poorly characterized epigastric pain without radiation last night. No relation to exertion, constant until 2nd and 3rd doses of NTG which resolved pain. No N/V, SOB. EKG with t wave inversion in V2, V3. Troponins trended, elevated but mostly stable at .1... Cardiology following    Review of Systems   Constitutional: Negative for chills and fever.   HENT: Negative for congestion and sore throat.    Eyes: Negative for photophobia and visual disturbance.   Respiratory: Positive for shortness of breath (Improved). Negative for cough.    Cardiovascular: Negative for chest pain, palpitations and leg swelling.   Gastrointestinal: Positive for abdominal pain. Negative for constipation, diarrhea, nausea and vomiting.   Genitourinary:        Peoples removed   Musculoskeletal: Negative for arthralgias and myalgias.   Skin: Negative for rash and wound.   Neurological: Negative for syncope and headaches.   Psychiatric/Behavioral: Negative for agitation and confusion.     Objective:     Vital Signs (Most Recent):  Temp: 97.9 °F (36.6 °C) (10/20/17 0944)  Pulse: 74 (10/20/17 1000)  Resp: 20 (10/20/17 1000)  BP: 129/71 (10/20/17 0818)  SpO2: 97 % (10/20/17 1000) Vital Signs (24h Range):  Temp:  [97.9 °F (36.6 °C)-98.2 °F (36.8 °C)] 97.9 °F (36.6 °C)  Pulse:  [74-90] 74  Resp:  [12-23] 20  SpO2:  [94 %-100 %] 97 %  BP: (107-136)/(58-78) 129/71     Weight: 85.5 kg (188 lb 7.9 oz)  Body mass index is 31.37 kg/m².    Physical Exam   Constitutional: She is oriented to person, place, and time. No distress.   Thin, elderly female. Somewhat somnolent.    HENT:   Head: Normocephalic and atraumatic.   Eyes: Conjunctivae and EOM are normal.   Neck: Normal range of motion. Neck supple. No JVD present.   Cardiovascular: Normal rate and regular  rhythm.    Murmur (3/6 systolic murmur best heard over parasternal area) heard.  Pulmonary/Chest: Effort normal.   Diffuse fine inspiratory crackles  No wheezes   Abdominal: Soft. Bowel sounds are normal. She exhibits no distension. There is no guarding.   Mild epigastric tenderness   Genitourinary:   Genitourinary Comments: Peoples in place draining clear urine   Musculoskeletal: She exhibits no edema, tenderness or deformity.   S/p L mastectomy (with lymphectomy)   Neurological: She is alert and oriented to person, place, and time. No cranial nerve deficit.   Patient was unable to talk due to BiPAP and resp distress   Skin: Skin is warm and dry. She is not diaphoretic.   Trophic changes to BLE suggesting recent edema but none present today.    Psychiatric: She has a normal mood and affect.   Somnolent        Significant Labs:   ABGs: No results for input(s): PH, PCO2, HCO3, POCSATURATED, BE, TOTALHB, COHB, METHB, O2HB, POCFIO2 in the last 48 hours.  Blood Culture: No results for input(s): LABBLOO in the last 48 hours.  CBC:     Recent Labs  Lab 10/19/17  0530 10/20/17  0540   WBC 23.74* 25.74*   HGB 8.5* 7.8*   HCT 25.6* 23.3*    302     CMP:     Recent Labs  Lab 10/18/17  1330  10/19/17  0530  10/19/17  2328 10/20/17  0114 10/20/17  0540   *  < > 144  < > 139 139 138  138   K 4.6  < > 4.3  < > 4.0 4.5 4.4  4.4     < > 104  < > 102 102 102  102   CO2 25  < > 27  < > 24 23 29  29   *  < > 383*  < > 158* 142* 201*  201*   *  < > 107*  < > 88* 92* 91*  91*   CREATININE 3.0*  < > 2.8*  < > 2.2* 2.2* 2.3*  2.3*   CALCIUM 8.5*  < > 8.1*  < > 7.8* 7.7* 7.7*  7.7*   PROT 6.8  --  6.5  --   --   --  6.0   ALBUMIN 2.8*  --  2.8*  --   --   --  2.8*   BILITOT 0.6  --  0.5  --   --   --  0.5   ALKPHOS 260*  --  315*  --   --   --  303*   AST 43*  --  57*  --   --   --  57*   ALT 49*  --  61*  --   --   --  62*   ANIONGAP 15  < > 13  < > 13 14 7*  7*   EGFRNONAA 15.2*  < > 16.5*  < >  22.1* 22.1* 20.9*  20.9*   < > = values in this interval not displayed.  Respiratory Culture: No results for input(s): GSRESP, RESPIRATORYC in the last 48 hours.  Urine Culture: No results for input(s): LABURIN in the last 48 hours.  Urine Studies:     Recent Labs  Lab 10/18/17  1505   COLORU Yellow   APPEARANCEUA Cloudy*   PHUR 5.0   SPECGRAV 1.015   PROTEINUA 2+*   GLUCUA 2+*   KETONESU Negative   BILIRUBINUA Negative   OCCULTUA 2+*   NITRITE Negative   UROBILINOGEN Negative   LEUKOCYTESUR Trace*   RBCUA 15*   WBCUA 12*   BACTERIA Occasional   SQUAMEPITHEL 2   HYALINECASTS 6*

## 2017-10-20 NOTE — ASSESSMENT & PLAN NOTE
Acute epigastric pain overnight  Worked up for cardiac cause with EKG and serial troponins  Will trend until troponins fall  Repeat EKG ordered per cardiology recs    Appreciate cardiology recs  Starting BBlocker for goal HR 50-60  Continuing ASA, statin, Plavix  Starting imdur  Will monitor BP and titrate

## 2017-10-20 NOTE — ASSESSMENT & PLAN NOTE
-patient had a repeat echo on 10/7/17 which showed LVEF 60-65%, PASP 70-75, moderate TR  -Venous US R LE was negative for DVT  -patient was treated at OSH with Bumex 2mg IV x1 day and albumin 1 hour prior  -cardiology was consulted; they reviewed CT and CXR images and due to these findings and bump in Cr after diuresis, they felt that the resp failure was more like PNA in etiology.    -patient was subsequently transferred here for higher level of care/pulmonary lavage    -S/p significant IV diuresis, now with elevated creatinine. Diuresis stopped as feel patient's lung exam is secondary to chronic disease and not fluid overload.   Cr stable

## 2017-10-20 NOTE — HPI
Ms. Hays is a 73 y/o female with a history significant for cryptogenic pneumonia on home oxygen, essential HTN, HLP, Type 2 diabetes with polyneuropathy and CKD stage 3 (home insulin therapy), HfpEF (EF 60%) and 3 vessel CAD identified by [facility] (5/29/17) with diffuse LAD disease without focal stenosis and mild-moderate disease in Cx, with PET scan (6/1/2017) showing flows in RCA and Cx above ischemic threshold but overall CFR in LAD showing a base to apical gradient likely contributing to ischemia.  Given that the LAD is more consistent with multiple tandem lesions causing ischemia rather than a focal stenosis, medical management was recommended.      Patient was originally admitted to [facility] with acute hypoxic respiratory failure requiring initial hospitalization in ICU and intubated. Patient now extubated and on floor but requring oxygen for her respiratory failure and [place] [facility] [place]     Overnight the patient experienced non-exertional epigastric pain, 10/10 crushing pain, similar in presentation to her NSTEMI, relieved by NTG *3 w/ EKG significant for T wave abnormalities. Troponin 0.117 (10/20/17 @ 0540), 0.111 (10/20/17 @ 0114).        Of note patient has not been on any anti-anginal medications during her current admission.

## 2017-10-20 NOTE — ASSESSMENT & PLAN NOTE
Cr stable in low 2s.   -Suspect 2/2 aggressive diuresis.   -will closely monitor with daily BMPs

## 2017-10-20 NOTE — PLAN OF CARE
Problem: Patient Care Overview  Goal: Plan of Care Review  Outcome: Ongoing (interventions implemented as appropriate)  Pt verbalizes no complaints. Denies CP, SOB, or other discomforts. Pt insulin drip turned off on night shift. Pt CBG remained in 200 this shift. Pt receiving scheduled insulin with meals. Pt up with assistance. Pt ambulating to and from bedside commode with 2 person assist. Pt remains free of fall or injury. Pt verbalizes understanding of plan of care. Will continue to monitor.

## 2017-10-20 NOTE — PLAN OF CARE
Problem: Patient Care Overview  Goal: Plan of Care Review  Outcome: Ongoing (interventions implemented as appropriate)  POC reviewed with patient and son with both verbalizing understanding. VSS and patient denies presence of pain. Cardiology consulted for pt experiencing epigastric pain relieved by 3 doses of nitro. Trending troponin. Insulin gtt DC'ed and patient transitioned to subcutaneous insulin. BS to be checked at 0230, 0330, and then begin AC/HS BS checking per verbal from Jinny. Patient oxygen saturation staying above 95% on 3L NC, continuing to wean patient. Patient received a total of 30 meq potassium replacement IV. Patient being turned q2 to prevent skin breakdown. Fall bundle implemented and patient has remained free of falls and injuries. Patient in no apparent sign of distress, will continue to monitor.

## 2017-10-21 NOTE — ASSESSMENT & PLAN NOTE
Improved, now comfortable on NC oxygen, will monitor  -Patient has been diuresed extensively, holding fluids to keep lungs dry

## 2017-10-21 NOTE — PLAN OF CARE
Problem: Physical Therapy Goal  Goal: Physical Therapy Goal  Goals to be met by: 10/27/2017    Patient will increase functional independence with mobility by performin. Supine to sit with MInimal Assistance - met 10/21  2. Sit to stand transfer with Moderate Assistance - met 10/21  3. Gait  x 50 feet with Moderate Assistance using Rolling Walker. - not met  4. Sitting at edge of bed x8 minutes with Minimal Assistance - met 10/19  5. Stand for 5 minutes with Minimal Assistance using Rolling Walker. - met 10/19  6. Lower extremity exercise program x15 reps per handout, with independence - met 10/21              Outcome: Ongoing (interventions implemented as appropriate)  Treatment completed. Some goals met. Date updated and goals appropriate.

## 2017-10-21 NOTE — ASSESSMENT & PLAN NOTE
55 determir units last night + 25 aspart with meals + moderate SSI  -Still uncontrolled last night, 111 on BMP this AM. Around 130 at breakfast, holding 15/25 units with breakfast and rechecking in 1 hour  -Will monitor closely and titrate to 140-180

## 2017-10-21 NOTE — ASSESSMENT & PLAN NOTE
-patient had a repeat echo on 10/7/17 which showed LVEF 60-65%, PASP 70-75, moderate TR  -Venous US R LE was negative for DVT  -patient was treated at OSH with Bumex 2mg IV x1 day and albumin 1 hour prior  -cardiology was consulted; they reviewed CT and CXR images and due to these findings and bump in Cr after diuresis, they felt that the resp failure was more like PNA in etiology.    -patient was subsequently transferred here for higher level of care/pulmonary lavage    -S/p significant IV diuresis, elevated creatinine but improving. Diuresis stopped as feel patient's lung exam is secondary to chronic disease and not fluid overload.   Cr improving

## 2017-10-21 NOTE — PROGRESS NOTES
[facility] Medicine  Progress Note    Patient Name: Edie Hays  MRN: [id number]  Patient Class: IP- Inpatient   Admission Date: 10/13/2017  Length of Stay: 8 days  Attending Physician: Daisha Pantoja MD  Primary Care Provider: Primary Doctor [facility] Medicine Team: [facility] 3 Micheal Nevarez MD    Subjective:     Principal Problem:Anginal chest pain at rest    HPI:  71 y/o female with past medical history of cryptogenic pneumonia (home oxygen; respondent to steroids), HTN, HLP, Type 2 diabetes (on insulin) with polyneuropathy and CKD stage 3 and CAD was admitted to [facility] with acute hypoxic respiratory failure requiring initial hospitalization in ICU and intubated.  Patient has been [place] [facility] [place] of acute hypoxic respiratory failure and pulmonary consult.     Patients cytogenic PNA was first diagnosed in 5/2017 at [facility]. Patient was placed on a steroid taper from 60mg to 30mg daily, continue taper 10mg every 3 days, 20mg daily.    Patient was originally admitted to [facility] on 10/7/17 with septic shock. Patient required intubation with ICU level care. She was treated with zyvox, levoquin and maxipime.  She was extubated and required intermittent BiPAP and oxygen for her respiratory failure. Cardiology had been following the patient at [facility].  Sputum culture with normal growth. Urine culture negative.  BCx X2 were NGTD. Attempts have been made to diuresis patient with IV lasix but resulted in worsening of renal function so have backed off diuresis and trying to use IV Diuril without much success. Due to this, patient was transferred to [facility] [place] higher level of care and Pulmonary.    Of note, patient has 3 vessel CAD with negative PET stress for perfusion defects done here at [facility] so CABG was not indicated     Interval History:  Transitioned to subq insulin yesterday. Was persistently elevated but this  morning came down.     Workup for epigastric pain with mildly elevated troponin. Cardiology recommended medical management, meds started. Epigastric pain resolved and has not returned.       Review of Systems   Constitutional: Negative for chills and fever.   HENT: Negative for congestion and sore throat.    Eyes: Negative for photophobia and visual disturbance.   Respiratory: Negative for cough and shortness of breath (Improved).    Cardiovascular: Negative for chest pain, palpitations and leg swelling.   Gastrointestinal: Negative for abdominal pain, constipation, diarrhea, nausea and vomiting.   Genitourinary: Negative for decreased urine volume, dysuria and hematuria.        Peoples removed   Musculoskeletal: Negative for arthralgias and myalgias.   Skin: Negative for rash and wound.   Neurological: Negative for syncope and headaches.   Psychiatric/Behavioral: Negative for agitation and confusion.     Objective:     Vital Signs (Most Recent):  Temp: 98.1 °F (36.7 °C) (10/21/17 0849)  Pulse: 71 (10/21/17 0815)  Resp: 15 (10/21/17 0815)  BP: 127/65 (10/21/17 0815)  SpO2: 96 % (10/21/17 0815) Vital Signs (24h Range):  Temp:  [97.9 °F (36.6 °C)-98.3 °F (36.8 °C)] 98.1 °F (36.7 °C)  Pulse:  [65-87] 71  Resp:  [15-21] 15  SpO2:  [94 %-99 %] 96 %  BP: (111-135)/(61-74) 127/65     Weight: 85.5 kg (188 lb 7.9 oz)  Body mass index is 31.37 kg/m².    Physical Exam   Constitutional: She is oriented to person, place, and time. No distress.   Thin, elderly female. Somewhat somnolent.    HENT:   Head: Normocephalic and atraumatic.   Eyes: Conjunctivae and EOM are normal.   Neck: Normal range of motion. Neck supple. No JVD present.   Cardiovascular: Normal rate and regular rhythm.    Murmur (3/6 systolic murmur best heard over parasternal area) heard.  Pulmonary/Chest: Effort normal.   Diffuse fine inspiratory crackles  No wheezes   Abdominal: Soft. Bowel sounds are normal. She exhibits no distension. There is no tenderness.  There is no guarding.   Genitourinary:   Genitourinary Comments: Peoples removed   Musculoskeletal: She exhibits no edema, tenderness or deformity.   S/p L mastectomy (with lymphectomy)   Neurological: She is alert and oriented to person, place, and time. No cranial nerve deficit.   Skin: Skin is warm and dry. She is not diaphoretic.   Trophic changes to BLE suggesting recent edema but none present today.    Psychiatric: She has a normal mood and affect.   Somnolent        Significant Labs:   ABGs: No results for input(s): PH, PCO2, HCO3, POCSATURATED, BE, TOTALHB, COHB, METHB, O2HB, POCFIO2 in the last 48 hours.  Blood Culture: No results for input(s): LABBLOO in the last 48 hours.  CBC:     Recent Labs  Lab 10/20/17  0540 10/20/17  1513 10/21/17  0524   WBC 25.74* 20.84* 24.14*   HGB 7.8* 7.4* 7.6*   HCT 23.3* 22.6* 22.8*    300 337     CMP:     Recent Labs  Lab 10/20/17  0114 10/20/17  0540 10/21/17  0524    138  138 139   K 4.5 4.4  4.4 4.7    102  102 103   CO2 23 29  29 25   * 201*  201* 111*   BUN 92* 91*  91* 81*   CREATININE 2.2* 2.3*  2.3* 2.1*   CALCIUM 7.7* 7.7*  7.7* 7.9*   PROT  --  6.0  --    ALBUMIN  --  2.8*  --    BILITOT  --  0.5  --    ALKPHOS  --  303*  --    AST  --  57*  --    ALT  --  62*  --    ANIONGAP 14 7*  7* 11   EGFRNONAA 22.1* 20.9*  20.9* 23.3*     Respiratory Culture: No results for input(s): GSRESP, RESPIRATORYC in the last 48 hours.  Urine Culture: No results for input(s): LABURIN in the last 48 hours.  Urine Studies:   No results for input(s): COLORU, APPEARANCEUA, PHUR, SPECGRAV, PROTEINUA, GLUCUA, KETONESU, BILIRUBINUA, OCCULTUA, NITRITE, UROBILINOGEN, LEUKOCYTESUR, RBCUA, WBCUA, BACTERIA, SQUAMEPITHEL, HYALINECASTS in the last 48 hours.    Invalid input(s): ELIZABETH      Assessment/Plan:      Urinary retention    Peoples removed, able to void  Will monitor          Type 2 diabetes mellitus with hyperglycemia, with long-term current use of  insulin    55 determir units last night + 25 aspart with meals + moderate SSI  -Still uncontrolled last night, 111 on BMP this AM. Around 130 at breakfast, holding 15/25 units with breakfast and rechecking in 1 hour  -Will monitor closely and titrate to 140-180          Hypernatremia    Stable today. Daily BMPs        Acute on chronic respiratory failure with hypoxia    Improved, now comfortable on NC oxygen, will monitor  -Patient has been diuresed extensively, holding fluids to keep lungs dry          Type 2 diabetes mellitus with stage 3 chronic kidney disease, with long-term current use of insulin    Cr stable in low 2s.   -Suspect 2/2 aggressive diuresis.   -will closely monitor with daily BMPs          Acute on chronic diastolic heart failure    -patient had a repeat echo on 10/7/17 which showed LVEF 60-65%, PASP 70-75, moderate TR  -Venous US R LE was negative for DVT  -patient was treated at OSH with Bumex 2mg IV x1 day and albumin 1 hour prior  -cardiology was consulted; they reviewed CT and CXR images and due to these findings and bump in Cr after diuresis, they felt that the resp failure was more like PNA in etiology.    -patient was subsequently transferred here for higher level of care/pulmonary lavage    -S/p significant IV diuresis, elevated creatinine but improving. Diuresis stopped as feel patient's lung exam is secondary to chronic disease and not fluid overload.   Cr improving          Microcytic anemia    -labs consistent with iron deficiency. Small drop today, will repeat this afternoon to rule out bleed  -continue ferrous sulfate 325  -patient required 2U transfusion at OSH  -will get CBC q daily and trend           HLD (hyperlipidemia)    -continue with crestor 20mg        Essential hypertension    on amlodipine 10mg + imdur, metoprolol  Will monitor and adjust as needed.           Coronary artery disease involving native coronary artery of native heart without angina pectoris    Acute  epigastric pain overnight yesterday  Worked up for cardiac cause with EKG and serial troponins      Appreciate cardiology recs  Starting BBlocker for goal HR 50-60  Continuing ASA, statin, Plavix  Starting imdur  Will monitor BP and titrate          VTE Risk Mitigation         Ordered     heparin (porcine) injection 5,000 Units  Every 8 hours     Route:  Subcutaneous        10/16/17 1320     Place sequential compression device  Until discontinued      10/13/17 2324     Medium Risk of VTE  Once      10/13/17 2316        Dispo: planning for SNF Monday    Micheal Nevarez MD   Internal Medicine PGY-1  [phone]

## 2017-10-21 NOTE — PLAN OF CARE
Problem: Patient Care Overview  Goal: Plan of Care Review  Outcome: Ongoing (interventions implemented as appropriate)  Pt verbalizes no complaints. Denies CP, SOB, or other discomforts. Pt blood sugars being monitored ACHS with meal time insulin given. Pt turns independently in bed. Pt remains free of fall or injury. Pt verbalizes understanding of plan of care. Will continue to monitor.

## 2017-10-21 NOTE — PT/OT/SLP PROGRESS
Physical Therapy  Treatment    Edie Hays   MRN: [id number]   Admitting Diagnosis: Anginal chest pain at rest    PT Received On: 10/21/17  PT Start Time: 1237     PT Stop Time: 1300    PT Total Time (min): 23 min       Billable Minutes:  Therapeutic Activity 15 and Therapeutic Exercise 8    Treatment Type: Treatment  PT/PTA: PT     PTA Visit Number: 0       General Precautions: Standard, fall  Orthopedic Precautions: N/A   Braces: N/A    Do you have any cultural, spiritual, Zoroastrian conflicts, given your current situation?: none reported     Subjective:  Communicated with RN prior to session and son present in room. Pt agreeable to session. Per son, pt fatigued today 2nd to transferring to the [facility] x2 during the day.       Pain/Comfort  Pain Rating 1: 0/10  Pain Rating Post-Intervention 1: 4/10 (headache and nausea)    Objective:   Patient found with: telemetry, oxygen    Functional Mobility:  Bed Mobility:   Scooting/Bridging: Minimum Assistance (to EOB)  Supine to Sit: Standby Assist (required increased time and use of HR)    Transfers:  Sit <> Stand Assistance: Minimum Assistance (x 2 trials; assist at hips for upright posture)  Sit <> Stand Assistive Device: Rolling Walker  Trial 1: from bed  Trial 2: from chair     Gait:   Gait Distance: 6 ft with RW bed>chair  Assistance 1: Moderate assistance  Gait Assistive Device: Rolling walker  Gait Pattern: reciprocal  Gait Deviation(s): decreased nayeli, increased time in double stance, decreased velocity of limb motion, decreased toe-to-floor clearance, decreased weight-shifting ability, decreased step length, decreased stride length    Pt fatigued and SOB, requiring a seated rest break in chair. SOB returned to baseline after ~5 minutes. After ambulation, pt reported having a slight headache and nausea.     Balance:   Static Sit: FAIR+: Able to take MINIMAL challenges from all directions  Dynamic Sit: FAIR: Cannot move trunk without losing balance  Static Stand:  POOR+: Needs MINIMAL assist to maintain  Dynamic stand: POOR: N/A     Therapeutic Activities and Exercises:  PT educated pt on incr OOB activity including sitting in bedside chair majority of day. Pt verbalized understanding.  Ambulation within room with RW with moderate assist to increase endurance and improve tolerance to OOB activity.    Sitting in chair B LE AROM  Ankle pumps x 15 reps  LAQ's x 15 reps  Marching x 15 reps       AM-PAC 6 CLICK MOBILITY  How much help from another person does this patient currently need?   1 = Unable, Total/Dependent Assistance  2 = A lot, Maximum/Moderate Assistance  3 = A little, Minimum/Contact Guard/Supervision  4 = None, Modified [place]/Independent    Turning over in bed (including adjusting bedclothes, sheets and blankets)?: 3  Sitting down on and standing up from a chair with arms (e.g., wheelchair, bedside commode, etc.): 3  Moving from lying on back to sitting on the side of the bed?: 3  Moving to and from a bed to a chair (including a wheelchair)?: 2  Need to walk in hospital room?: 2  Climbing 3-5 steps with a railing?: 2  Total Score: 15    AM-PAC Raw Score CMS G-Code Modifier Level of Impairment Assistance   6 % Total / Unable   7 - 9 CM 80 - 100% Maximal Assist   10 - 14 CL 60 - 80% Moderate Assist   15 - 19 CK 40 - 60% Moderate Assist   20 - 22 CJ 20 - 40% Minimal Assist   23 CI 1-20% SBA / CGA   24 CH 0% Independent/ Mod I     Patient left up in chair with all lines intact, call button in reach and son present.    Assessment:  Edie Hays is a 72 y.o. female with a medical diagnosis of Anginal chest pain at rest and presents with decreased strength, endurance, balance, transfers, and gait distance. Pt with moderate fatigue today (per son, 2nd to transferring to [facility] x 2 today) and could not ambulate as far as last session. Pt with SOB that returned to baseline after ~5 minutes. Pt could perform B LE therex with independence. Pt would benefit from  continued skilled PT to address impairments. Pt would also benefit from a SNF upon d/c to maximize strength, endurance, and safety before re-entering the home.     Rehab identified problem list/impairments: Rehab identified problem list/impairments: weakness, impaired endurance, impaired self care skills, impaired functional mobilty, gait instability, impaired balance, decreased lower extremity function, pain, impaired cardiopulmonary response to activity    Rehab potential is good.    Activity tolerance: Fair    Discharge recommendations: Discharge Facility/Level Of Care Needs: nursing facility, skilled     Barriers to discharge: Barriers to Discharge: Decreased caregiver support (lives with mother)    Equipment recommendations: Equipment Needed After Discharge: walker, rolling     GOALS:    Physical Therapy Goals        Problem: Physical Therapy Goal    Goal Priority Disciplines Outcome Goal Variances Interventions   Physical Therapy Goal     PT/OT, PT Ongoing (interventions implemented as appropriate)     Description:  Goals to be met by: 10/27/2017    Patient will increase functional independence with mobility by performin. Supine to sit with MInimal Assistance - met 10/21  2. Sit to stand transfer with Moderate Assistance - met 10/21  3. Gait  x 50 feet with Moderate Assistance using Rolling Walker. - not met  4. Sitting at edge of bed x8 minutes with Minimal Assistance - met 10/19  5. Stand for 5 minutes with Minimal Assistance using Rolling Walker. - met 10/19  6. Lower extremity exercise program x15 reps per handout, with independence - met 10/21                                PLAN:    Patient to be seen 5 x/week  to address the above listed problems via gait training, therapeutic activities, therapeutic exercises, neuromuscular re-education  Plan of Care expires: 17  Plan of Care reviewed with: patient, jose         Michelle Shaffer, PT  10/21/2017

## 2017-10-21 NOTE — SUBJECTIVE & OBJECTIVE
Interval History:  Transitioned to subq insulin yesterday. Was persistently elevated but this morning came down.     Workup for epigastric pain with mildly elevated troponin. Cardiology recommended medical management, meds started. Epigastric pain resolved and has not returned.       Review of Systems   Constitutional: Negative for chills and fever.   HENT: Negative for congestion and sore throat.    Eyes: Negative for photophobia and visual disturbance.   Respiratory: Negative for cough and shortness of breath (Improved).    Cardiovascular: Negative for chest pain, palpitations and leg swelling.   Gastrointestinal: Negative for abdominal pain, constipation, diarrhea, nausea and vomiting.   Genitourinary: Negative for decreased urine volume, dysuria and hematuria.        Peoples removed   Musculoskeletal: Negative for arthralgias and myalgias.   Skin: Negative for rash and wound.   Neurological: Negative for syncope and headaches.   Psychiatric/Behavioral: Negative for agitation and confusion.     Objective:     Vital Signs (Most Recent):  Temp: 98.1 °F (36.7 °C) (10/21/17 0849)  Pulse: 71 (10/21/17 0815)  Resp: 15 (10/21/17 0815)  BP: 127/65 (10/21/17 0815)  SpO2: 96 % (10/21/17 0815) Vital Signs (24h Range):  Temp:  [97.9 °F (36.6 °C)-98.3 °F (36.8 °C)] 98.1 °F (36.7 °C)  Pulse:  [65-87] 71  Resp:  [15-21] 15  SpO2:  [94 %-99 %] 96 %  BP: (111-135)/(61-74) 127/65     Weight: 85.5 kg (188 lb 7.9 oz)  Body mass index is 31.37 kg/m².    Physical Exam   Constitutional: She is oriented to person, place, and time. No distress.   Thin, elderly female. Somewhat somnolent.    HENT:   Head: Normocephalic and atraumatic.   Eyes: Conjunctivae and EOM are normal.   Neck: Normal range of motion. Neck supple. No JVD present.   Cardiovascular: Normal rate and regular rhythm.    Murmur (3/6 systolic murmur best heard over parasternal area) heard.  Pulmonary/Chest: Effort normal.   Diffuse fine inspiratory crackles  No wheezes    Abdominal: Soft. Bowel sounds are normal. She exhibits no distension. There is no tenderness. There is no guarding.   Genitourinary:   Genitourinary Comments: Peoples removed   Musculoskeletal: She exhibits no edema, tenderness or deformity.   S/p L mastectomy (with lymphectomy)   Neurological: She is alert and oriented to person, place, and time. No cranial nerve deficit.   Skin: Skin is warm and dry. She is not diaphoretic.   Trophic changes to BLE suggesting recent edema but none present today.    Psychiatric: She has a normal mood and affect.   Somnolent        Significant Labs:   ABGs: No results for input(s): PH, PCO2, HCO3, POCSATURATED, BE, TOTALHB, COHB, METHB, O2HB, POCFIO2 in the last 48 hours.  Blood Culture: No results for input(s): LABBLOO in the last 48 hours.  CBC:     Recent Labs  Lab 10/20/17  0540 10/20/17  1513 10/21/17  0524   WBC 25.74* 20.84* 24.14*   HGB 7.8* 7.4* 7.6*   HCT 23.3* 22.6* 22.8*    300 337     CMP:     Recent Labs  Lab 10/20/17  0114 10/20/17  0540 10/21/17  0524    138  138 139   K 4.5 4.4  4.4 4.7    102  102 103   CO2 23 29  29 25   * 201*  201* 111*   BUN 92* 91*  91* 81*   CREATININE 2.2* 2.3*  2.3* 2.1*   CALCIUM 7.7* 7.7*  7.7* 7.9*   PROT  --  6.0  --    ALBUMIN  --  2.8*  --    BILITOT  --  0.5  --    ALKPHOS  --  303*  --    AST  --  57*  --    ALT  --  62*  --    ANIONGAP 14 7*  7* 11   EGFRNONAA 22.1* 20.9*  20.9* 23.3*     Respiratory Culture: No results for input(s): GSRESP, RESPIRATORYC in the last 48 hours.  Urine Culture: No results for input(s): LABURIN in the last 48 hours.  Urine Studies:   No results for input(s): COLORU, APPEARANCEUA, PHUR, SPECGRAV, PROTEINUA, GLUCUA, KETONESU, BILIRUBINUA, OCCULTUA, NITRITE, UROBILINOGEN, LEUKOCYTESUR, RBCUA, WBCUA, BACTERIA, SQUAMEPITHEL, HYALINECASTS in the last 48 hours.    Invalid input(s): ELIZABETH

## 2017-10-21 NOTE — ASSESSMENT & PLAN NOTE
Acute epigastric pain overnight yesterday  Worked up for cardiac cause with EKG and serial troponins      Appreciate cardiology recs  Starting BBlocker for goal HR 50-60  Continuing ASA, statin, Plavix  Starting imdur  Will monitor BP and titrate

## 2017-10-21 NOTE — PLAN OF CARE
Problem: Patient Care Overview  Goal: Plan of Care Review  Outcome: Ongoing (interventions implemented as appropriate)  Pt remained stable throughout the night. No acute distress noted. Pt remained free from injury. VSS. Pt denies any chest pain or SOB. Blood sugar checks q4h. SHAN PICC. 2L NC. Pt understands plan of care. Will continue to monitor.

## 2017-10-22 NOTE — ASSESSMENT & PLAN NOTE
Acute epigastric pain overnight 10/19/17  Worked up for cardiac cause with EKG and serial troponins  Appreciate cardiology recs  Starting BBlocker for goal HR 50-60  Continuing ASA, statin, Plavix  Starting imdur  Will monitor BP and titrate

## 2017-10-22 NOTE — SUBJECTIVE & OBJECTIVE
Interval History: No acute events overnight.     Review of Systems   Constitutional: Negative for chills and fever.   HENT: Negative for congestion and sore throat.    Eyes: Negative for photophobia and visual disturbance.   Respiratory: Negative for cough and shortness of breath.    Cardiovascular: Negative for chest pain, palpitations and leg swelling.   Gastrointestinal: Negative for abdominal pain, constipation, diarrhea, nausea and vomiting.   Genitourinary: Negative for decreased urine volume, dysuria and hematuria.   Musculoskeletal: Negative for arthralgias and myalgias.   Skin: Negative for rash and wound.   Neurological: Negative for syncope and headaches.   Psychiatric/Behavioral: Negative for agitation and confusion.     Objective:     Vital Signs (Most Recent):  Temp: 97.8 °F (36.6 °C) (10/22/17 0845)  Pulse: 70 (10/22/17 1500)  Resp: (!) 21 (10/22/17 1311)  BP: 120/65 (10/22/17 1311)  SpO2: 97 % (10/22/17 1311) Vital Signs (24h Range):  Temp:  [97.8 °F (36.6 °C)-98.3 °F (36.8 °C)] 97.8 °F (36.6 °C)  Pulse:  [66-76] 70  Resp:  [16-21] 21  SpO2:  [92 %-100 %] 97 %  BP: (111-135)/(60-70) 120/65     Weight: 85.5 kg (188 lb 7.9 oz)  Body mass index is 31.37 kg/m².    Intake/Output Summary (Last 24 hours) at 10/22/17 1621  Last data filed at 10/22/17 1400   Gross per 24 hour   Intake             1170 ml   Output              650 ml   Net              520 ml      Physical Exam   Constitutional: She is oriented to person, place, and time. No distress.   Thin, elderly female. Somewhat somnolent.    HENT:   Head: Normocephalic and atraumatic.   Eyes: Conjunctivae and EOM are normal.   Neck: Normal range of motion. Neck supple. No JVD present.   Cardiovascular: Normal rate and regular rhythm.    Murmur (3/6 systolic murmur best heard over parasternal area) heard.  Pulmonary/Chest: Effort normal and breath sounds normal. No respiratory distress. She has no wheezes.   Abdominal: Soft. Bowel sounds are normal. She  exhibits no distension. There is no tenderness. There is no guarding.   Musculoskeletal: She exhibits no edema, tenderness or deformity.   S/p L mastectomy (with lymphectomy)   Neurological: She is alert and oriented to person, place, and time. No cranial nerve deficit.   Skin: Skin is warm and dry. She is not diaphoretic.   Psychiatric: She has a normal mood and affect.       Significant Labs:   BMP:   Recent Labs  Lab 10/22/17  0439   *      K 4.4      CO2 27   BUN 82*   CREATININE 2.1*   CALCIUM 7.9*   MG 2.3     CBC:   Recent Labs  Lab 10/21/17  0524 10/22/17  0439   WBC 24.14* 22.82*   HGB 7.6* 7.2*   HCT 22.8* 21.8*    350     Magnesium:   Recent Labs  Lab 10/22/17  0439   MG 2.3     POCT Glucose:   Recent Labs  Lab 10/22/17  0844 10/22/17  0914 10/22/17  1200   POCTGLUCOSE 193* 149* 217*       Significant Imaging: I have reviewed all pertinent imaging results/findings within the past 24 hours.

## 2017-10-22 NOTE — PROGRESS NOTES
[facility] Hospital Medicine  Progress Note    Patient Name: Edie Hays  MRN: [id number]  Patient Class: IP- Inpatient   Admission Date: 10/13/2017  Length of Stay: 9 days  Attending Physician: Daisha Pantoja MD  Primary Care Provider: Primary Doctor [facility] Medicine Team: [facility] HOSP MED 3 Christopher Aguilar MD    Subjective:     Principal Problem:Acute on chronic respiratory failure with hypoxia    HPI:  71 y/o female with past medical history of cryptogenic pneumonia (home oxygen; respondent to steroids), HTN, HLP, Type 2 diabetes (on insulin) with polyneuropathy and CKD stage 3 and CAD was admitted to [facility] with acute hypoxic respiratory failure requiring initial hospitalization in ICU and intubated.  Patient has been transferred to [facility] for evaluation of acute hypoxic respiratory failure and pulmonary consult.     Patients cytogenic PNA was first diagnosed in 5/2017 at [facility]. Patient was placed on a steroid taper from 60mg to 30mg daily, continue taper 10mg every 3 days, 20mg daily.    Patient was originally admitted to [facility] on 10/7/17 with septic shock. Patient required intubation with ICU level care. She was treated with zyvox, levoquin and maxipime.  She was extubated and required intermittent BiPAP and oxygen for her respiratory failure. Cardiology had been following the patient at [facility].  Sputum culture with normal growth. Urine culture negative.  BCx X2 were NGTD. Attempts have been made to diuresis patient with IV lasix but resulted in worsening of renal function so have backed off diuresis and trying to use IV Diuril without much success. Due to this, patient was transferred to [facility] for higher level of care and Pulmonary.    Of note, patient has 3 vessel CAD with negative PET stress for perfusion defects done here at [facility] so CABG was not indicated     Hospital Course:  Pt stepped down to hospital medicine and required IV  insulin infusion as blood glucose levels incredibly difficult to control. Pt now transitioned to SQ with goal glucose 140-180 (detemir 60U QHS now and aspart 27U with meals). Evaluated by cardiology for episode of chest pain and lopressor and imdur added for HR control and anti-anginal effect. Patient tolerating a diet without any further abdominal or chest pain and working with PT/OT, stable for SNF placement and to be transferred on Monday.     Interval History: No acute events overnight.     Review of Systems   Constitutional: Negative for chills and fever.   HENT: Negative for congestion and sore throat.    Eyes: Negative for photophobia and visual disturbance.   Respiratory: Negative for cough and shortness of breath.    Cardiovascular: Negative for chest pain, palpitations and leg swelling.   Gastrointestinal: Negative for abdominal pain, constipation, diarrhea, nausea and vomiting.   Genitourinary: Negative for decreased urine volume, dysuria and hematuria.   Musculoskeletal: Negative for arthralgias and myalgias.   Skin: Negative for rash and wound.   Neurological: Negative for syncope and headaches.   Psychiatric/Behavioral: Negative for agitation and confusion.     Objective:     Vital Signs (Most Recent):  Temp: 97.8 °F (36.6 °C) (10/22/17 0845)  Pulse: 70 (10/22/17 1500)  Resp: (!) 21 (10/22/17 1311)  BP: 120/65 (10/22/17 1311)  SpO2: 97 % (10/22/17 1311) Vital Signs (24h Range):  Temp:  [97.8 °F (36.6 °C)-98.3 °F (36.8 °C)] 97.8 °F (36.6 °C)  Pulse:  [66-76] 70  Resp:  [16-21] 21  SpO2:  [92 %-100 %] 97 %  BP: (111-135)/(60-70) 120/65     Weight: 85.5 kg (188 lb 7.9 oz)  Body mass index is 31.37 kg/m².    Intake/Output Summary (Last 24 hours) at 10/22/17 1621  Last data filed at 10/22/17 1400   Gross per 24 hour   Intake             1170 ml   Output              650 ml   Net              520 ml      Physical Exam   Constitutional: She is oriented to person, place, and time. No distress.   Thin, elderly  female. Somewhat somnolent.    HENT:   Head: Normocephalic and atraumatic.   Eyes: Conjunctivae and EOM are normal.   Neck: Normal range of motion. Neck supple. No JVD present.   Cardiovascular: Normal rate and regular rhythm.    Murmur (3/6 systolic murmur best heard over parasternal area) heard.  Pulmonary/Chest: Effort normal and breath sounds normal. No respiratory distress. She has no wheezes.   Abdominal: Soft. Bowel sounds are normal. She exhibits no distension. There is no tenderness. There is no guarding.   Musculoskeletal: She exhibits no edema, tenderness or deformity.   S/p L mastectomy (with lymphectomy)   Neurological: She is alert and oriented to person, place, and time. No cranial nerve deficit.   Skin: Skin is warm and dry. She is not diaphoretic.   Psychiatric: She has a normal mood and affect.       Significant Labs:   BMP:   Recent Labs  Lab 10/22/17  0439   *      K 4.4      CO2 27   BUN 82*   CREATININE 2.1*   CALCIUM 7.9*   MG 2.3     CBC:   Recent Labs  Lab 10/21/17  0524 10/22/17  0439   WBC 24.14* 22.82*   HGB 7.6* 7.2*   HCT 22.8* 21.8*    350     Magnesium:   Recent Labs  Lab 10/22/17  0439   MG 2.3     POCT Glucose:   Recent Labs  Lab 10/22/17  0844 10/22/17  0914 10/22/17  1200   POCTGLUCOSE 193* 149* 217*       Significant Imaging: I have reviewed all pertinent imaging results/findings within the past 24 hours.    Assessment/Plan:      * Acute on chronic respiratory failure with hypoxia    -Improved, now comfortable on NC oxygen, will monitor  -Patient has been diuresed extensively, holding fluids to keep lungs dry          Anginal chest pain at rest              Urinary retention    Peoples removed, able to void          Type 2 diabetes mellitus with hyperglycemia, with long-term current use of insulin    - increased to 60 determir units + 27 aspart with meals + moderate SSI  -Will monitor closely and titrate to 140-180          Hypernatremia    Stable today.  Daily BMPs        Type 2 diabetes mellitus with stage 3 chronic kidney disease, with long-term current use of insulin    -Cr stable in low 2s.   -Suspect 2/2 aggressive diuresis.   -will closely monitor with daily BMPs          Acute on chronic diastolic heart failure    -patient had a repeat echo on 10/7/17 which showed LVEF 60-65%, PASP 70-75, moderate TR  -Venous US R LE was negative for DVT  -patient was treated at OSH with Bumex 2mg IV x1 day and albumin 1 hour prior  -cardiology was consulted; they reviewed CT and CXR images and due to these findings and bump in Cr after diuresis, they felt that the resp failure was more like PNA in etiology.    -patient was subsequently transferred here for higher level of care/pulmonary lavage  -S/p significant IV diuresis, elevated creatinine but improving. Diuresis stopped as feel patient's lung exam is secondary to chronic disease and not fluid overload.   Cr improving          Microcytic anemia    -labs consistent with iron deficiency  -continue ferrous sulfate 325  -patient required 2U transfusion at OSH  -will get CBC q daily and trend           HLD (hyperlipidemia)    -continue with crestor 20mg        Essential hypertension    on amlodipine 10mg + imdur, metoprolol  Will monitor and adjust as needed.           Coronary artery disease involving native coronary artery of native heart without angina pectoris    Acute epigastric pain overnight 10/19/17  Worked up for cardiac cause with EKG and serial troponins  Appreciate cardiology recs  Starting BBlocker for goal HR 50-60  Continuing ASA, statin, Plavix  Starting imdur  Will monitor BP and titrate          VTE Risk Mitigation         Ordered     heparin (porcine) injection 5,000 Units  Every 8 hours     Route:  Subcutaneous        10/16/17 1320     Place sequential compression device  Until discontinued      10/13/17 2324     Medium Risk of VTE  Once      10/13/17 2316              Christopher Aguilar MD  Department of  Hospital Medicine [facility]

## 2017-10-22 NOTE — ASSESSMENT & PLAN NOTE
- increased to 60 determir units + 27 aspart with meals + moderate SSI  -Will monitor closely and titrate to 140-180

## 2017-10-22 NOTE — ASSESSMENT & PLAN NOTE
-Improved, now comfortable on NC oxygen, will monitor  -Patient has been diuresed extensively, holding fluids to keep lungs dry

## 2017-10-22 NOTE — ASSESSMENT & PLAN NOTE
-Cr stable in low 2s.   -Suspect 2/2 aggressive diuresis.   -will closely monitor with daily BMPs

## 2017-10-22 NOTE — NURSING
Patient c/o epigastric pain, said to be associated with meal ( prior occurrence). Patient declined PRN pain for the pain. On call MD for IM 3 notified, no orders at this time.

## 2017-10-23 NOTE — PLAN OF CARE
CM rec'd call from Katie with [facility] who stated that [place] is not in network with the insurance company. Katie faxed this CM a list consisting of 3 facilities within a 50 mile radius of patient's home. CM met with patient and son at bedside. Explained that previous choice is not in Network with their [employer] insurance. List given of the 3 in Network choices. Patient and son verbalized understanding, will review list and notify this CM of preference. Son in agreement with sending referral to all 3 to expedite discharge. CM contact info given.

## 2017-10-23 NOTE — PROGRESS NOTES
[facility]  Adult Nutrition  Progress Note    SUMMARY     Recommendations    Recommendation/Intervention:   1. If intake consistently <50%, order Boost Glucose Control TID. Pt intake currently ~50%.  2. Continue current diet order with texture per SLP.   3. RD following.    Goals: Meet % EEN, EPN  Nutrition Goal Status: progressing towards goal  Communication of RD Recs: reviewed with RN    Reason for Assessment    Reason for Assessment: RD follow-up  Diagnosis: other (see comments) (HF)  Relevent Medical History: DM, Breast ca   Interdisciplinary Rounds: did not attend     General Information Comments: Pt reports fair appetite with meal intake ~50%. Fe deficiency noted. Pt to discharge to SNF today per chart.    Nutrition Discharge Planning: Adequate PO intake    Nutrition Prescription Ordered    Current Diet Order: Diabetic 1800/dental soft  Nutrition Order Comments: Nectar thick liquids  Current Nutrition Support Formula Ordered: Discontinued               Evaluation of Received Nutrients/Fluid Intake        Comments: LBM 10/21     % Intake of Estimated Energy Needs: 50 - 75 %  % Meal Intake: 50%     Nutrition Risk Screen     Nutrition Risk Screen: tube feeding or parenteral nutrition    Nutrition/Diet History    Patient Reported Diet/Restrictions/Preferences: other (see comments) (TERRELL)              Factors Affecting Nutritional Intake: other (see comments) (Diet just advanced; unsure of PO intake.)                Labs/Tests/Procedures/Meds       Pertinent Labs Reviewed: reviewed, pertinent  Pertinent Labs Comments: BUN 78, Crt 2.2, GFR 22.1, Glu 292  Pertinent Medications Reviewed: reviewed, pertinent  Pertinent Medications Comments: Fe, heparin, insulin, pantoprazole, polyethylene glycol, prednisone, statin    Physical Findings    Overall Physical Appearance: overweight     Oral/Mouth Cavity: WDL  Skin: other (see comments) (Wounds)    Anthropometrics    Temp: 97.6 °F (36.4 °C)    "  Height: 5' 5" (165.1 cm)  Weight Method: Bed Scale  Weight: 85.5 kg (188 lb 7.9 oz)     Ideal Body Weight (IBW), Female: 125 lb     % Ideal Body Weight, Female (lb): 150.8 lb  BMI (Calculated): 31.4  BMI Grade: 30 - 34.9- obesity - grade I                            Estimated/Assessed Needs    Weight Used For Calorie Calculations: 85.5 kg (188 lb 7.9 oz)      Energy Calorie Requirements (kcal): 1718 kcal/d  Energy Need Method: Maren[place]-St Jeor (1.25 PAL)     RMR (Marengo-St. Jeor Equation): 1375.88        Weight Used For Protein Calculations: 85.5 kg (188 lb 7.9 oz)  Protein Requirements: 69-86 g/d (.8-1 g/kg)     Fluid Need Method: RDA Method, other (see comments) (Per MD or 1 mL/kcal)        RDA Method (mL): 1718         CHO Requirement: 50% total kcals     Assessment and Plan    Inadequate energy intake r/t inability to consume sufficient energy AEB NPO with no alternate means of nutrition.  Status: Resolved    Monitor and Evaluation    Food and Nutrient Intake: energy intake, food and beverage intake  Food and Nutrient Adminstration: diet order     Physical Activity and Function: nutrition-related ADLs and IADLs  Anthropometric Measurements: weight change, weight, body mass index  Biochemical Data, Medical Tests and Procedures: lipid profile, inflammatory profile, glucose/endocrine profile, gastrointestinal profile, electrolyte and renal panel  Nutrition-Focused Physical Findings: overall appearance    Nutrition Risk    Level of Risk: F/u 1x weekly    Nutrition Follow-Up    RD Follow-up?: Yes  "

## 2017-10-23 NOTE — PLAN OF CARE
CM informed SW that pt's insurance is in network with 3 facilities in 50 miles from their home, and [place] is not one of them.  CM updated pt.      [facility] ([phone])    [facility] ([phone])    [facility] [phone])      SW informed [facility] of referrals and will f/u.    Bernarda Martell LCSW     [phone]  Critical Care (MICU)

## 2017-10-23 NOTE — ASSESSMENT & PLAN NOTE
-Improved, now comfortable on NC oxygen, will monitor  -Patient has been diuresed extensively, holding fluids to keep lungs dry  Appears going + but unclear if Ins and Outs positive

## 2017-10-23 NOTE — PT/OT/SLP PROGRESS
Physical Therapy  Treatment    Edie Hays   MRN: [id number]   Admitting Diagnosis: Acute on chronic respiratory failure with hypoxia    PT Received On: 10/23/17  PT Start Time: 1321     PT Stop Time: 1333    PT Total Time (min): 12 min       Billable Minutes:  Therapeutic Exercise 12    Treatment Type: Treatment  PT/PTA: PT     PTA Visit Number: 0       General Precautions: Standard, fall  Orthopedic Precautions: N/A   Braces: N/A    Do you have any cultural, spiritual, Sikh conflicts, given your current situation?: none reported     Subjective:  Communicated with RN prior to session and son present in room. Pt agreeable to session. RN notified PT about pt's current blood transfusion. Upon standing, pt reported feeling dizzy, requiring sitting to return to baseline.       Pain/Comfort  Pain Rating 1: 0/10  Pain Rating Post-Intervention 1: 0/10    Objective:   Patient found with: telemetry, oxygen    Functional Mobility:  Bed Mobility:   Rolling/Turning to Left:  (N/T 2nd to pt found in chair)    Transfers:  Sit <> Stand Assistance: Minimum Assistance (from chair to facilitate upright posture)  Sit <> Stand Assistive Device: Rolling Walker      Therapeutic Activities and Exercises:  Sitting in chair B LE and UE AROM  LAQ's x 15 reps  Ankle pumps x 15 reps  Marching x 15 reps  Elbow flexion/extension x 15 reps  Shoulder flexion/extension x 15 reps    AM-PAC 6 CLICK MOBILITY  How much help from another person does this patient currently need?   1 = Unable, Total/Dependent Assistance  2 = A lot, Maximum/Moderate Assistance  3 = A little, Minimum/Contact Guard/Supervision  4 = None, Modified [place]/Independent    Turning over in bed (including adjusting bedclothes, sheets and blankets)?: 3  Sitting down on and standing up from a chair with arms (e.g., wheelchair, bedside commode, etc.): 3  Moving from lying on back to sitting on the side of the bed?: 3  Moving to and from a bed to a chair (including a  wheelchair)?: 3  Need to walk in hospital room?: 2  Climbing 3-5 steps with a railing?: 2  Total Score: 16    AM-PAC Raw Score CMS G-Code Modifier Level of Impairment Assistance   6 % Total / Unable   7 - 9 CM 80 - 100% Maximal Assist   10 - 14 CL 60 - 80% Moderate Assist   15 - 19 CK 40 - 60% Moderate Assist   20 - 22 CJ 20 - 40% Minimal Assist   23 CI 1-20% SBA / CGA   24 CH 0% Independent/ Mod I     Patient left up in chair with all lines intact, call button in reach and RN notified.    Assessment:  Edie Hays is a 72 y.o. female with a medical diagnosis of Acute on chronic respiratory failure with hypoxia and presents with impairments listed below. Pt progressing towards goals, but not at PLOF. Pt with fair tolerance to session. Pt limited to seated B UE and LE therex today 2nd to dizziness/nausea upon standing . Pt would benefit from continued PT services to improve overall functional mobility and prevent deconditioning. Recommend d/c to Skilled nursing facility to maximize functional independence.      Rehab identified problem list/impairments: Rehab identified problem list/impairments: weakness, impaired endurance, impaired self care skills, impaired functional mobilty, gait instability, impaired balance, impaired cardiopulmonary response to activity    Rehab potential is good.    Activity tolerance: Fair    Discharge recommendations: Discharge Facility/Level Of Care Needs: nursing facility, skilled     Barriers to discharge: Barriers to Discharge: Decreased caregiver support    Equipment recommendations: Equipment Needed After Discharge: walker, rolling     GOALS:    Physical Therapy Goals        Problem: Physical Therapy Goal    Goal Priority Disciplines Outcome Goal Variances Interventions   Physical Therapy Goal     PT/OT, PT Ongoing (interventions implemented as appropriate)     Description:  Goals to be met by: 10/27/2017    Patient will increase functional independence with mobility by  performin. Supine to sit with MInimal Assistance - met 10/21  2. Sit to stand transfer with Moderate Assistance - met 10/21  3. Gait  x 50 feet with Moderate Assistance using Rolling Walker. - not met  4. Sitting at edge of bed x8 minutes with Minimal Assistance - met 10/19  5. Stand for 5 minutes with Minimal Assistance using Rolling Walker. - met 10/19  6. Lower extremity exercise program x15 reps per handout, with independence - met 10/21                                PLAN:    Patient to be seen 5 x/week  to address the above listed problems via gait training, therapeutic activities, therapeutic exercises, neuromuscular re-education  Plan of Care expires: 17  Plan of Care reviewed with: patient, son         Michelle Shaffer, PT  10/23/2017

## 2017-10-23 NOTE — ASSESSMENT & PLAN NOTE
-labs consistent with iron deficiency  -continue ferrous sulfate 325  -patient required 2U transfusion at OSH  -will get CBC q daily and trend     Has steadily declined, unclear source. Concern for UGI ulcer given prednisone usage.   Will transfuse 1 unit

## 2017-10-23 NOTE — PLAN OF CARE
Problem: Patient Care Overview  Goal: Plan of Care Review  Outcome: Ongoing (interventions implemented as appropriate)  Patient is free of fall/trauma/injury. Denies CP, SOB, or pain/discomfort. Blood glucose monitoring ongoing. MD notified of increased sugars this morning. Both long and short acting insulin administered per MD orders and PRN sliding scale. Pt tolerating well. 1 unit PRBC infused today. Pt tolerated well. Plan of care discussed with pt. Pt verbalizes understanding. All questions addressed. Will continue to monitor

## 2017-10-23 NOTE — PLAN OF CARE
Problem: Physical Therapy Goal  Goal: Physical Therapy Goal  Goals to be met by: 10/27/2017    Patient will increase functional independence with mobility by performin. Supine to sit with MInimal Assistance - met 10/21  2. Sit to stand transfer with Moderate Assistance - met 10/21  3. Gait  x 50 feet with Moderate Assistance using Rolling Walker. - not met  4. Sitting at edge of bed x8 minutes with Minimal Assistance - met 10/19  5. Stand for 5 minutes with Minimal Assistance using Rolling Walker. - met 10/19  6. Lower extremity exercise program x15 reps per handout, with independence - met 10/21               Outcome: Ongoing (interventions implemented as appropriate)  Treatment completed. No goals met. Goals appropriate.

## 2017-10-23 NOTE — ASSESSMENT & PLAN NOTE
- Did not get dose last night.   - Given 55 units detemir this AM and 20 units later in day. Continuing with 27 units AC and SSI moderate correction.   - Rechecking BG now and will plan for night regimen. Will likely give more detemir overnight.

## 2017-10-23 NOTE — PLAN OF CARE
Problem: Patient Care Overview  Goal: Plan of Care Review  Outcome: Ongoing (interventions implemented as appropriate)  Pt remained stable throughout the night. No acute distress noted. Pt remained free from injury. VSS. Pt denies any chest pain or SOB. Blood sugar checks q4h. SHAN PICC. 2L NC. SNF placement. Pt understands plan of care. Will continue to monitor.

## 2017-10-23 NOTE — PLAN OF CARE
Problem: Occupational Therapy Goal  Goal: Occupational Therapy Goal  Goals to be met by: 11/08/2017    Patient will increase functional independence with ADLs by performing:    UE Dressing with Supervision.  LE Dressing with Minimal Assistance.  Grooming while seated with Supervision.  Toileting from bedside commode with Minimal Assistance for hygiene and clothing management.                         -goal revised: from toilet with Moderate Assistance   Sitting at edge of bed or bedside chair  x30 minutes with Supervision.  Rolling to Bilateral with Modified [place].                      - Rolling to L side: MOD I w/ side rail MET   Supine to sit with Supervision.  Stand pivot transfers with Supervision.  Toilet transfer to bedside commode with Supervision.                            - toilet       Outcome: Ongoing (interventions implemented as appropriate)  Goals have been revised and remain appropriate    Comments: Cont OT POC

## 2017-10-23 NOTE — HPI
This is a 73 yo F with cryptogenic organizing pneumonia (home oxygen; respondent to steroids), HTN, HLD, Type 2 diabetes (on insulin) with polyneuropathy and CKD stage 3 and CAD on DAPT was admitted to [facility] with acute hypoxic respiratory failure requiring initial hospitalization in ICU and intubated. Patient was transferred to [facility] for evaluation of acute hypoxic respiratory failure and pulmonology evaluation.  She was diagnosed with  in May 2017 at OSH and was started on steroids. She is currently on steroid taper, receiving 20mg po daily. Over the past few days, patient's hg has been downtrending from 9.1 on 10/18 down to 6.9 today. She was receiving 1UPRBC this afternoon. Patient does note that her stools have been black for the past few days. No previous history of GI bleed. No history of EGD or colonoscopy.

## 2017-10-23 NOTE — ASSESSMENT & PLAN NOTE
-patient had a repeat echo on 10/7/17 which showed LVEF 60-65%, PASP 70-75, moderate TR  -Venous US R LE was negative for DVT  -patient was treated at OSH with Bumex 2mg IV x1 day and albumin 1 hour prior  -cardiology was consulted; they reviewed CT and CXR images and due to these findings and bump in Cr after diuresis, they felt that the resp failure was more like PNA in etiology.    -patient was subsequently transferred here for higher level of care/pulmonary lavage  -S/p significant IV diuresis, elevated creatinine but improving. Diuresis stopped as feel patient's lung exam is secondary to chronic disease and not fluid overload.   Cr stable

## 2017-10-23 NOTE — PROGRESS NOTES
Dr. Nevarez notified of pt's accucheck reading of 383. Ordered to give a one time dose of insulin detemir 20units. No further orders given. Will continue to monitor.

## 2017-10-23 NOTE — PT/OT/SLP PROGRESS
Occupational Therapy  Treatment    Edie Hays   MRN: [id number]   Admitting Diagnosis: Acute on chronic respiratory failure with hypoxia    OT Date of Treatment: 10/23/17   OT Start Time: 0935  OT Stop Time: 1004  OT Total Time (min): 29 min    Billable Minutes:  Self Care/Home Management 10 and Therapeutic Activity 19    General Precautions: Standard, fall  Orthopedic Precautions: N/A  Braces: N/A         Subjective:  Communicated with RN prior to session.  Pt agreeable to OT today.   Pain/Comfort  Pain Rating 1: 0/10    Objective:  Patient found supine with son present at bedside and: telemetry, oxygen     Functional Mobility:  Bed Mobility:  Rolling/Turning to Left: Modified independent  Rolling/Turning Right: With side rail  Scooting/Bridging: Modified Independent  Supine to Sit: Contact Guard Assistance    Transfers:   Sit <> Stand Assistance: Moderate Assistance  Sit <> Stand Assistive Device: No Assistive Device  Bed <> Chair Technique: Stand Pivot  Bed <> Chair Transfer Assistance: Minimum Assistance  Bed <> Chair Assistive Device: No Assistive Device  Toilet Transfer Technique: Stand Pivot  Toilet Transfer Assistance: Minimum Assistance  Toilet Transfer Assistive Device: No Assistive Device    Functional Ambulation: Pt completed 2 steps & stand pivot to commode and bedside chair w/ MIN A HHA     Activities of Daily Living:  LE Dressing Level of Assistance: Total assistance      Balance:   Static Sit: FAIR: Maintains without assist, but unable to take any challenges   Dynamic Sit: FAIR: Cannot move trunk without losing balance  Static Stand: POOR: Needs MODERATE assist to maintain  Dynamic stand: POOR: N/A    Therapeutic Activities and Exercises:  Pt educated on OT POC and importance of OOB activity. Pt completed bed mobility MOD I using side rails; required extra time for rest break sitting EOB 2/2 increased fatigue; pt unable to don socks at this time requiring TOTAL A. Pt transferred sit<>stand 1st trial  "and performed stand pivot to commode MOD A -MAX A; pt completed 2nd sit<> stand and stand pivot to bedside chair MIN A - MOD A. Pt required another extended rest break while sitting on commode prior to transfer to chair. OT recommends skilled nursing facility for pt in order to further her therapy and maximize function.     AM-PAC 6 CLICK ADL   How much help from another person does this patient currently need?   1 = Unable, Total/Dependent Assistance  2 = A lot, Maximum/Moderate Assistance  3 = A little, Minimum/Contact Guard/Supervision  4 = None, Modified [place]/Independent    Putting on and taking off regular lower body clothing? : 1  Bathing (including washing, rinsing, drying)?: 1  Toileting, which includes using toilet, bedpan, or urinal? : 1  Putting on and taking off regular upper body clothing?: 2  Taking care of personal grooming such as brushing teeth?: 2  Eating meals?: 2  Total Score: 9     AM-PAC Raw Score CMS "G-Code Modifier Level of Impairment Assistance   6 % Total / Unable   7 - 8 CM 80 - 100% Maximal Assist   9-13 CL 60 - 80% Moderate Assist   14 - 19 CK 40 - 60% Moderate Assist   20 - 22 CJ 20 - 40% Minimal Assist   23 CI 1-20% SBA / CGA   24 CH 0% Independent/ Mod I       Patient left up in chair with all lines intact, call button in reach and son present    ASSESSMENT:  Edie Hays is a 72 y.o. female with a medical diagnosis of Acute on chronic respiratory failure with hypoxia and presents with increased fatigue but noted improvement w/ functional mobility and transfers. She demonstrated ability to transfer to commode and chair w/out AD 2/2 to their close proximity to bed. Pt requires a RW to ambulate and transfer w/ nsg to promote safety. She continues to benefit from OT services to further improve her activity tolerance and ADL participation.     Rehab identified problem list/impairments: Rehab identified problem list/impairments: weakness, impaired endurance, gait " instability, impaired functional mobilty, impaired self care skills, impaired balance, decreased lower extremity function, impaired cardiopulmonary response to activity    Rehab potential is good.    Activity tolerance: Fair    Discharge recommendations: Discharge Facility/Level Of Care Needs: nursing facility, skilled     Barriers to discharge: Barriers to Discharge: Decreased caregiver support    Equipment recommendations: walker, rolling     GOALS:    Occupational Therapy Goals        Problem: Occupational Therapy Goal    Goal Priority Disciplines Outcome Interventions   Occupational Therapy Goal     OT, PT/OT Ongoing (interventions implemented as appropriate)    Description:  Goals to be met by: 11/08/2017    Patient will increase functional independence with ADLs by performing:    UE Dressing with Supervision.  LE Dressing with Minimal Assistance.  Grooming while seated with Supervision.  Toileting from bedside commode with Minimal Assistance for hygiene and clothing management.                         -goal revised: from toilet with Moderate Assistance   Sitting at edge of bed or bedside chair  x30 minutes with Supervision.  Rolling to Bilateral with Modified [place].                      - Rolling to L side: MOD I w/ side rail MET   Supine to sit with Supervision.  Stand pivot transfers with Supervision.  Toilet transfer to bedside commode with Supervision.                            - toilet                         Plan:  Patient to be seen 4 x/week to address the above listed problems via self-care/home management, therapeutic activities, therapeutic exercises  Plan of Care expires: 11/07/17  Plan of Care reviewed with: patient         Jennifer Crowder, OT  10/23/2017

## 2017-10-23 NOTE — ASSESSMENT & PLAN NOTE
71 yo F with cryptogenic organizing pneumonia (home oxygen; on steroids) CAD on DAPT with downtrending Hg and reports of black stool, concerning for PUD.    Recommendations:  - Consider stopping DAPT if worried about GI bleed  - Keep NPO after MN  - Will plan for EGD tomorrow  - Transfuse as necessary for goal Hg >7-8

## 2017-10-23 NOTE — CONSULTS
[facility]  Gastroenterology  Consult Note    Patient Name: Edie Hays  MRN: [id number]  Admission Date: 10/13/2017  Hospital Length of Stay: 10 days  Code Status: Full Code   Attending Provider: Daisha Pantoja MD   Consulting Provider: Godfrey Dow MD  Primary Care Physician: Primary Doctor No  Principal Problem:Acute on chronic respiratory failure with hypoxia    Inpatient consult to Gastroenterology  Consult performed by: GODFREY DOW  Consult ordered by: ABHISHEK HUERTA        Subjective:     HPI:  This is a 71 yo F with cryptogenic organizing pneumonia (home oxygen; respondent to steroids), HTN, HLD, Type 2 diabetes (on insulin) with polyneuropathy and CKD stage 3 and CAD on DAPT was admitted to [facility] with acute hypoxic respiratory failure requiring initial hospitalization in ICU and intubated. Patient was transferred to [facility] for evaluation of acute hypoxic respiratory failure and pulmonology evaluation.  She was diagnosed with  in May 2017 at OSH and was started on steroids. She is currently on steroid taper, receiving 20mg po daily. Over the past few days, patient's hg has been downtrending from 9.1 on 10/18 down to 6.9 today. She was receiving 1UPRBC this afternoon. Patient does note that her stools have been black for the past few days. No previous history of GI bleed. No history of EGD or colonoscopy.    Past Medical History:   Diagnosis Date    BOOP (bronchiolitis obliterans with organizing pneumonia) 5/2/2017    Overview:  Diagnosed clinically via response to steroids    Breast cancer     Chronic diastolic heart failure     Coronary artery disease involving native coronary artery of native heart without angina pectoris 5/30/2017    Encounter for blood transfusion     Essential hypertension 5/31/2017    HLD (hyperlipidemia) 5/31/2017    NSTEMI (non-ST elevated myocardial infarction) 5/31/2017    Type 2 diabetes mellitus with diabetic  polyneuropathy, with long-term current use of insulin 5/31/2017    Last Assessment & Plan:  History of type 2 diabetes managed on basal bolus insulin at home.  She is currently receiving Lantus 35 units HS and Humalog 5 units with meals.  Blood glucose is stable ranging between 167-200.  We will continue to monitor as she is still receiving steroids making her glucose control difficult.This morning, patient had an accucheck documented at 60 which improved after o    Type 2 diabetes mellitus with stage 3 chronic kidney disease, with long-term current use of insulin 10/13/2017       Past Surgical History:   Procedure Laterality Date    MASTECTOMY         Review of patient's allergies indicates:   Allergen Reactions    Codeine     Lortab [hydrocodone-acetaminophen] Nausea Only    Penicillins      Family History     None        Social History Main Topics    Smoking status: Never Smoker    Smokeless tobacco: Not on file    Alcohol use Not on file    Drug use: Unknown    Sexual activity: Not on file     Review of Systems   Constitutional: Positive for appetite change and fatigue. Negative for activity change, chills and fever.   HENT: Negative for sore throat and trouble swallowing.    Respiratory: Negative for cough and shortness of breath.    Cardiovascular: Negative for chest pain and leg swelling.   Gastrointestinal: Negative for abdominal distention, abdominal pain, constipation, diarrhea, nausea and vomiting.   Genitourinary: Negative for decreased urine volume and difficulty urinating.   Musculoskeletal: Negative for arthralgias and back pain.   Neurological: Negative for dizziness and light-headedness.   Psychiatric/Behavioral: Negative for agitation and confusion.     Objective:     Vital Signs (Most Recent):  Temp: 98.1 °F (36.7 °C) (10/23/17 1645)  Pulse: 68 (10/23/17 1700)  Resp: 15 (10/23/17 1700)  BP: 120/65 (10/23/17 1700)  SpO2: 98 % (10/23/17 1700) Vital Signs (24h Range):  Temp:  [97.6 °F (36.4  °C)-98.1 °F (36.7 °C)] 98.1 °F (36.7 °C)  Pulse:  [63-78] 68  Resp:  [15-31] 15  SpO2:  [92 %-99 %] 98 %  BP: (109-125)/(57-68) 120/65     Weight: 85.5 kg (188 lb 7.9 oz) (10/18/17 1200)  Body mass index is 31.37 kg/m².      Intake/Output Summary (Last 24 hours) at 10/23/17 1748  Last data filed at 10/23/17 1645   Gross per 24 hour   Intake             1330 ml   Output              500 ml   Net              830 ml       Lines/Drains/Airways     Peripherally Inserted Central Catheter Line                 PICC Triple Lumen 10/14/17 left brachial 9 days                Physical Exam   Constitutional: She is oriented to person, place, and time.   Somnolent but arousable. In no apparent distress.   HENT:   Mouth/Throat: Oropharynx is clear and moist.   Eyes: No scleral icterus.   Cardiovascular: Normal rate, regular rhythm and normal heart sounds.    No murmur heard.  Pulmonary/Chest: Effort normal and breath sounds normal. She has no wheezes.   Abdominal: Soft. Bowel sounds are normal. She exhibits no distension and no mass. There is no tenderness. There is no guarding.   Musculoskeletal: She exhibits no edema or deformity.   Lymphadenopathy:     She has no cervical adenopathy.   Neurological: She is alert and oriented to person, place, and time.   Skin: Skin is warm and dry.   Psychiatric: She has a normal mood and affect.   Vitals reviewed.      Significant Labs:  CBC:   Recent Labs  Lab 10/22/17  0439 10/23/17  0509   WBC 22.82* 25.27*   HGB 7.2* 6.9*   HCT 21.8* 20.5*    355*     BMP:   Recent Labs  Lab 10/22/17  0439 10/23/17  0509   * 292*    137   K 4.4 4.7    103   CO2 27 26   BUN 82* 78*   CREATININE 2.1* 2.2*   CALCIUM 7.9* 7.9*   MG 2.3  --      Coagulation: No results for input(s): INR, APTT in the last 48 hours.    Invalid input(s): PT    Significant Imaging:  Imaging results within the past 24 hours have been reviewed.    Assessment/Plan:     Microcytic anemia    71 yo F with  cryptogenic organizing pneumonia (home oxygen; on steroids) CAD on DAPT with downtrending Hg and reports of black stool, concerning for PUD.    Recommendations:  - Consider stopping DAPT if worried about GI bleed  - Keep NPO after MN  - Will plan for EGD tomorrow  - Transfuse as necessary for goal Hg >7-8            Thank you for your consult.     Godfrey Martin MD PGY-IV  Gastroenterology Fellow  [facility]  P [phone]

## 2017-10-23 NOTE — PROGRESS NOTES
[facility] Hospital Medicine  Progress Note    Patient Name: Edie Hays  MRN: [id number]  Patient Class: IP- Inpatient   Admission Date: 10/13/2017  Length of Stay: 10 days  Attending Physician: Daisha Pantoja MD  Primary Care Provider: Primary Doctor [facility] Medicine Team: [facility] 3 Micheal Nevarez MD    Subjective:     Principal Problem:Acute on chronic respiratory failure with hypoxia    HPI:  73 y/o female with past medical history of cryptogenic pneumonia (home oxygen; respondent to steroids), HTN, HLP, Type 2 diabetes (on insulin) with polyneuropathy and CKD stage 3 and CAD was admitted to [facility] with acute hypoxic respiratory failure requiring initial hospitalization in ICU and intubated.  Patient has been [place] [facility] [place] of acute hypoxic respiratory failure and pulmonary consult.     Patients cytogenic PNA was first diagnosed in 5/2017 at [facility]. Patient was placed on a steroid taper from 60mg to 30mg daily, continue taper 10mg every 3 days, 20mg daily.    Patient was originally admitted to [facility] on 10/7/17 with septic shock. Patient required intubation with ICU level care. She was treated with zyvox, levoquin and maxipime.  She was extubated and required intermittent BiPAP and oxygen for her respiratory failure. Cardiology had been following the patient at [facility].  Sputum culture with normal growth. Urine culture negative.  BCx X2 were NGTD. Attempts have been made to diuresis patient with IV lasix but resulted in worsening of renal function so have backed off diuresis and trying to use IV Diuril without much success. Due to this, patient was transferred [place] [facility] [place] higher level of care and Pulmonary.    Of note, patient has 3 vessel CAD with negative PET stress for perfusion defects done [place] at [facility] so CABG was not indicated     Hospital Course:  Pt stepped down to hospital medicine and required IV  insulin infusion as blood glucose levels incredibly difficult to control. Pt now transitioned to SQ with goal glucose 140-180 (detemir 60U QHS now and aspart 27U with meals). Evaluated by cardiology for episode of chest pain and lopressor and imdur added for HR control and anti-anginal effect. Patient tolerating a diet without any further abdominal or chest pain and working with PT/OT, stable for SNF placement and to be transferred on Monday.     Interval History: Detemir held last night due to glucose in 70s. This AM hyperglycemic.   No subsequent epigastric pain. Still on high dose prednisone. Black stool, no change in character. No gross blood. No syncope, light headedness. Unclear if ever scoped in past.     Review of Systems   Constitutional: Negative for chills and fever.   HENT: Negative for congestion and sore throat.    Eyes: Negative for photophobia and visual disturbance.   Respiratory: Negative for cough and shortness of breath.    Cardiovascular: Negative for chest pain, palpitations and leg swelling.   Gastrointestinal: Negative for abdominal pain, constipation, diarrhea, nausea and vomiting.   Genitourinary: Negative for decreased urine volume, dysuria and hematuria.   Musculoskeletal: Negative for arthralgias and myalgias.   Skin: Negative for rash and wound.   Neurological: Negative for syncope and headaches.   Psychiatric/Behavioral: Negative for agitation and confusion.     Objective:     Vital Signs (Most Recent):  Temp: 98.1 °F (36.7 °C) (10/23/17 1330)  Pulse: 65 (10/23/17 1500)  Resp: 18 (10/23/17 1400)  BP: 125/68 (10/23/17 1400)  SpO2: 99 % (10/23/17 1400) Vital Signs (24h Range):  Temp:  [97.6 °F (36.4 °C)-98.1 °F (36.7 °C)] 98.1 °F (36.7 °C)  Pulse:  [63-78] 65  Resp:  [16-31] 18  SpO2:  [92 %-99 %] 99 %  BP: (109-125)/(57-68) 125/68     Weight: 85.5 kg (188 lb 7.9 oz)  Body mass index is 31.37 kg/m².    Intake/Output Summary (Last 24 hours) at 10/23/17 1531  Last data filed at 10/23/17  0500   Gross per 24 hour   Intake              540 ml   Output                0 ml   Net              540 ml      Physical Exam   Constitutional: She is oriented to person, place, and time. No distress.   Thin, elderly female. Somewhat somnolent.    HENT:   Head: Normocephalic and atraumatic.   Eyes: Conjunctivae and EOM are normal.   Neck: Normal range of motion. Neck supple. No JVD present.   Cardiovascular: Normal rate and regular rhythm.    Murmur (3/6 systolic murmur best heard over parasternal area) heard.  Pulmonary/Chest: Effort normal and breath sounds normal. No respiratory distress. She has no wheezes.   Abdominal: Soft. Bowel sounds are normal. She exhibits no distension. There is no tenderness. There is no guarding.   Musculoskeletal: She exhibits no edema, tenderness or deformity.   S/p L mastectomy (with lymphectomy)   Neurological: She is alert and oriented to person, place, and time. No cranial nerve deficit.   Skin: Skin is warm and dry. She is not diaphoretic.   Psychiatric: She has a normal mood and affect.       Significant Labs:   BMP:     Recent Labs  Lab 10/22/17  0439 10/23/17  0509   * 292*    137   K 4.4 4.7    103   CO2 27 26   BUN 82* 78*   CREATININE 2.1* 2.2*   CALCIUM 7.9* 7.9*   MG 2.3  --      CBC:     Recent Labs  Lab 10/22/17  0439 10/23/17  0509   WBC 22.82* 25.27*   HGB 7.2* 6.9*   HCT 21.8* 20.5*    355*     Magnesium:     Recent Labs  Lab 10/22/17  0439   MG 2.3     POCT Glucose:     Recent Labs  Lab 10/23/17  0533 10/23/17  0836 10/23/17  1231   POCTGLUCOSE 316* 393* 389*     Assessment/Plan:      * Acute on chronic respiratory failure with hypoxia    -Improved, now comfortable on [place] oxygen, will monitor  -Patient has been diuresed extensively, holding fluids to keep lungs dry  Appears going + but unclear if Ins and Outs positive          Anginal chest pain at rest    Medical management with imdur, B blocker          Urinary retention    Peoples  removed, able to void  -Monitoring          Type 2 diabetes mellitus with hyperglycemia, with long-term current use of insulin    - Did not get dose last night.   - Given 55 units detemir this AM and 20 units later in day. Continuing with 27 units AC and SSI moderate correction.   - Rechecking BG now and will plan for night regimen. Will likely give more detemir overnight.           Hypernatremia    Stable today. Daily BMPs        Type 2 diabetes mellitus with stage 3 chronic kidney disease, with long-term current use of insulin    -Cr stable in low 2s.   -Suspect 2/2 aggressive diuresis.   -will closely monitor with daily BMPs          Acute on chronic diastolic heart failure    -patient had a repeat echo on 10/7/17 which showed LVEF 60-65%, PASP 70-75, moderate TR  -Venous US R LE was negative for DVT  -patient was treated at OSH with Bumex 2mg IV x1 day and albumin 1 hour prior  -cardiology was consulted; they reviewed CT and CXR images and due to these findings and bump in Cr after diuresis, they felt that the resp failure was more like PNA in etiology.    -patient was subsequently transferred here for higher level of care/pulmonary lavage  -S/p significant IV diuresis, elevated creatinine but improving. Diuresis stopped as feel patient's lung exam is secondary to chronic disease and not fluid overload.   Cr stable          Microcytic anemia    -labs consistent with iron deficiency  -continue ferrous sulfate 325  -patient required 2U transfusion at OSH  -will get CBC q daily and trend     Has steadily declined, unclear source. Concern for UGI ulcer given prednisone usage.   Will transfuse 1 unit          HLD (hyperlipidemia)    -continue with crestor 20mg        Essential hypertension    on amlodipine 10mg + imdur, metoprolol  Will monitor and adjust as needed.           Coronary artery disease involving native coronary artery of native heart without angina pectoris    Acute epigastric pain overnight  10/19/17  Worked up for cardiac cause with EKG and serial troponins  Appreciate cardiology recs  Starting BBlocker for goal HR 50-60  Continuing ASA, statin, Plavix  Starting imdur  Will monitor BP and titrate          VTE Risk Mitigation         Ordered     heparin (porcine) injection 5,000 Units  Every 8 hours     Route:  Subcutaneous        10/16/17 1320     Place sequential compression device  Until discontinued      10/13/17 2324     Medium Risk of VTE  Once      10/13/17 2316        Dispo: SNF possibly tomorrow    Micheal Nevarez MD   Internal Medicine PGY-1  [phone]

## 2017-10-23 NOTE — SUBJECTIVE & OBJECTIVE
Past Medical History:   Diagnosis Date    BOOP (bronchiolitis obliterans with organizing pneumonia) 5/2/2017    Overview:  Diagnosed clinically via response to steroids    Breast cancer     Chronic diastolic heart failure     Coronary artery disease involving native coronary artery of native heart without angina pectoris 5/30/2017    Encounter for blood transfusion     Essential hypertension 5/31/2017    HLD (hyperlipidemia) 5/31/2017    NSTEMI (non-ST elevated myocardial infarction) 5/31/2017    Type 2 diabetes mellitus with diabetic polyneuropathy, with long-term current use of insulin 5/31/2017    Last Assessment & Plan:  History of type 2 diabetes managed on basal bolus insulin at home.  She is currently receiving Lantus 35 units HS and Humalog 5 units with meals.  Blood glucose is stable ranging between 167-200.  We will continue to monitor as she is still receiving steroids making her glucose control difficult.This morning, patient had an accucheck documented at 60 which improved after o    Type 2 diabetes mellitus with stage 3 chronic kidney disease, with long-term current use of insulin 10/13/2017       Past Surgical History:   Procedure Laterality Date    MASTECTOMY         Review of patient's allergies indicates:   Allergen Reactions    Codeine     Lortab [hydrocodone-acetaminophen] Nausea Only    Penicillins      Family History     None        Social History Main Topics    Smoking status: Never Smoker    Smokeless tobacco: Not on file    Alcohol use Not on file    Drug use: Unknown    Sexual activity: Not on file     Review of Systems   Constitutional: Positive for appetite change and fatigue. Negative for activity change, chills and fever.   HENT: Negative for sore throat and trouble swallowing.    Respiratory: Negative for cough and shortness of breath.    Cardiovascular: Negative for chest pain and leg swelling.   Gastrointestinal: Negative for abdominal distention, abdominal pain,  constipation, diarrhea, nausea and vomiting.   Genitourinary: Negative for decreased urine volume and difficulty urinating.   Musculoskeletal: Negative for arthralgias and back pain.   Neurological: Negative for dizziness and light-headedness.   Psychiatric/Behavioral: Negative for agitation and confusion.     Objective:     Vital Signs (Most Recent):  Temp: 98.1 °F (36.7 °C) (10/23/17 1645)  Pulse: 68 (10/23/17 1700)  Resp: 15 (10/23/17 1700)  BP: 120/65 (10/23/17 1700)  SpO2: 98 % (10/23/17 1700) Vital Signs (24h Range):  Temp:  [97.6 °F (36.4 °C)-98.1 °F (36.7 °C)] 98.1 °F (36.7 °C)  Pulse:  [63-78] 68  Resp:  [15-31] 15  SpO2:  [92 %-99 %] 98 %  BP: (109-125)/(57-68) 120/65     Weight: 85.5 kg (188 lb 7.9 oz) (10/18/17 1200)  Body mass index is 31.37 kg/m².      Intake/Output Summary (Last 24 hours) at 10/23/17 1748  Last data filed at 10/23/17 1645   Gross per 24 hour   Intake             1330 ml   Output              500 ml   Net              830 ml       Lines/Drains/Airways     Peripherally Inserted Central Catheter Line                 PICC Triple Lumen 10/14/17 left brachial 9 days                Physical Exam   Constitutional: She is oriented to person, place, and time.   Somnolent but arousable. In no apparent distress.   HENT:   Mouth/Throat: Oropharynx is clear and moist.   Eyes: No scleral icterus.   Cardiovascular: Normal rate, regular rhythm and normal heart sounds.    No murmur heard.  Pulmonary/Chest: Effort normal and breath sounds normal. She has no wheezes.   Abdominal: Soft. Bowel sounds are normal. She exhibits no distension and no mass. There is no tenderness. There is no guarding.   Musculoskeletal: She exhibits no edema or deformity.   Lymphadenopathy:     She has no cervical adenopathy.   Neurological: She is alert and oriented to person, place, and time.   Skin: Skin is warm and dry.   Psychiatric: She has a normal mood and affect.   Vitals reviewed.      Significant Labs:  CBC:   Recent  Labs  Lab 10/22/17  0439 10/23/17  0509   WBC 22.82* 25.27*   HGB 7.2* 6.9*   HCT 21.8* 20.5*    355*     BMP:   Recent Labs  Lab 10/22/17  0439 10/23/17  0509   * 292*    137   K 4.4 4.7    103   CO2 27 26   BUN 82* 78*   CREATININE 2.1* 2.2*   CALCIUM 7.9* 7.9*   MG 2.3  --      Coagulation: No results for input(s): INR, APTT in the last 48 hours.    Invalid input(s): PT    Significant Imaging:  Imaging results within the past 24 hours have been reviewed.

## 2017-10-23 NOTE — SUBJECTIVE & OBJECTIVE
Interval History: Detemir held last night due to glucose in 70s. This AM hyperglycemic.   No subsequent epigastric pain. Still on high dose prednisone. Black stool, no change in character. No gross blood. No syncope, light headedness. Unclear if ever scoped in past.     Review of Systems   Constitutional: Negative for chills and fever.   HENT: Negative for congestion and sore throat.    Eyes: Negative for photophobia and visual disturbance.   Respiratory: Negative for cough and shortness of breath.    Cardiovascular: Negative for chest pain, palpitations and leg swelling.   Gastrointestinal: Negative for abdominal pain, constipation, diarrhea, nausea and vomiting.   Genitourinary: Negative for decreased urine volume, dysuria and hematuria.   Musculoskeletal: Negative for arthralgias and myalgias.   Skin: Negative for rash and wound.   Neurological: Negative for syncope and headaches.   Psychiatric/Behavioral: Negative for agitation and confusion.     Objective:     Vital Signs (Most Recent):  Temp: 98.1 °F (36.7 °C) (10/23/17 1330)  Pulse: 65 (10/23/17 1500)  Resp: 18 (10/23/17 1400)  BP: 125/68 (10/23/17 1400)  SpO2: 99 % (10/23/17 1400) Vital Signs (24h Range):  Temp:  [97.6 °F (36.4 °C)-98.1 °F (36.7 °C)] 98.1 °F (36.7 °C)  Pulse:  [63-78] 65  Resp:  [16-31] 18  SpO2:  [92 %-99 %] 99 %  BP: (109-125)/(57-68) 125/68     Weight: 85.5 kg (188 lb 7.9 oz)  Body mass index is 31.37 kg/m².    Intake/Output Summary (Last 24 hours) at 10/23/17 1531  Last data filed at 10/23/17 0500   Gross per 24 hour   Intake              540 ml   Output                0 ml   Net              540 ml      Physical Exam   Constitutional: She is oriented to person, place, and time. No distress.   Thin, elderly female. Somewhat somnolent.    HENT:   Head: Normocephalic and atraumatic.   Eyes: Conjunctivae and EOM are normal.   Neck: Normal range of motion. Neck supple. No JVD present.   Cardiovascular: Normal rate and regular rhythm.     Murmur (3/6 systolic murmur best heard over parasternal area) heard.  Pulmonary/Chest: Effort normal and breath sounds normal. No respiratory distress. She has no wheezes.   Abdominal: Soft. Bowel sounds are normal. She exhibits no distension. There is no tenderness. There is no guarding.   Musculoskeletal: She exhibits no edema, tenderness or deformity.   S/p L mastectomy (with lymphectomy)   Neurological: She is alert and oriented to person, place, and time. No cranial nerve deficit.   Skin: Skin is warm and dry. She is not diaphoretic.   Psychiatric: She has a normal mood and affect.       Significant Labs:   BMP:     Recent Labs  Lab 10/22/17  0439 10/23/17  0509   * 292*    137   K 4.4 4.7    103   CO2 27 26   BUN 82* 78*   CREATININE 2.1* 2.2*   CALCIUM 7.9* 7.9*   MG 2.3  --      CBC:     Recent Labs  Lab 10/22/17  0439 10/23/17  0509   WBC 22.82* 25.27*   HGB 7.2* 6.9*   HCT 21.8* 20.5*    355*     Magnesium:     Recent Labs  Lab 10/22/17  0439   MG 2.3     POCT Glucose:     Recent Labs  Lab 10/23/17  0533 10/23/17  0836 10/23/17  1231   POCTGLUCOSE 316* 393* 389*

## 2017-10-24 PROBLEM — E87.0 HYPERNATREMIA: Status: RESOLVED | Noted: 2017-01-01 | Resolved: 2017-01-01

## 2017-10-24 PROBLEM — K92.1 MELENA: Status: ACTIVE | Noted: 2017-01-01

## 2017-10-24 PROBLEM — I20.89 ANGINAL CHEST PAIN AT REST: Status: RESOLVED | Noted: 2017-01-01 | Resolved: 2017-01-01

## 2017-10-24 PROBLEM — R33.9 URINARY RETENTION: Status: RESOLVED | Noted: 2017-01-01 | Resolved: 2017-01-01

## 2017-10-24 NOTE — SUBJECTIVE & OBJECTIVE
Interval History: BG this AM was 25. Given amp of D50 and came up to 250. Remained low throughout the day so all insulin held. Over 200 by mid afternoon so mealtime restarted. Was NPO for procedure for much of the day.     S/p EGD today. Esophageal candidiasis and erosive gastropathy found. Recommend fluconazole and PPI.     Review of Systems   Constitutional: Negative for chills and fever.   HENT: Negative for congestion and sore throat.    Eyes: Negative for photophobia and visual disturbance.   Respiratory: Negative for cough and shortness of breath.    Cardiovascular: Negative for chest pain, palpitations and leg swelling.   Gastrointestinal: Negative for abdominal pain, constipation, diarrhea, nausea and vomiting.   Genitourinary: Negative for decreased urine volume, dysuria and hematuria.   Musculoskeletal: Negative for arthralgias and myalgias.   Skin: Negative for rash and wound.   Neurological: Negative for syncope and headaches.   Psychiatric/Behavioral: Negative for agitation and confusion.     Objective:     Vital Signs (Most Recent):  Temp: 97.9 °F (36.6 °C) (10/24/17 1220)  Pulse: 67 (10/24/17 1550)  Resp: 16 (10/24/17 1550)  BP: (!) 126/42 (10/24/17 1220)  SpO2: 97 % (10/24/17 1550) Vital Signs (24h Range):  Temp:  [96.8 °F (36 °C)-98.5 °F (36.9 °C)] 97.9 °F (36.6 °C)  Pulse:  [55-69] 67  Resp:  [14-20] 16  SpO2:  [96 %-100 %] 97 %  BP: (110-148)/(42-75) 126/42     Weight: 85.5 kg (188 lb 7.9 oz)  Body mass index is 31.37 kg/m².    Intake/Output Summary (Last 24 hours) at 10/24/17 1634  Last data filed at 10/24/17 1400   Gross per 24 hour   Intake             1505 ml   Output              300 ml   Net             1205 ml      Physical Exam   Constitutional: She is oriented to person, place, and time. No distress.   Thin, elderly female. Not distressed. Not somnolent. A and Oriented x 3.    HENT:   Head: Normocephalic and atraumatic.   Eyes: Conjunctivae and EOM are normal.   Neck: Normal range of  motion. Neck supple. No JVD present.   Cardiovascular: Normal rate and regular rhythm.    Murmur (3/6 systolic murmur best heard over parasternal area) heard.  Pulmonary/Chest: Effort normal and breath sounds normal. No respiratory distress. She has no wheezes.   Abdominal: Soft. Bowel sounds are normal. She exhibits no distension. There is no tenderness. There is no guarding.   Musculoskeletal: She exhibits no edema, tenderness or deformity.   S/p L mastectomy (with lymphectomy)   Neurological: She is alert and oriented to person, place, and time. No cranial nerve deficit.   Skin: Skin is warm and dry. She is not diaphoretic.   Psychiatric: She has a normal mood and affect.       Significant Labs:   BMP:     Recent Labs  Lab 10/24/17  0432   GLU 25*      K 4.2      CO2 27   BUN 69*   CREATININE 2.0*   CALCIUM 8.5*     CBC:     Recent Labs  Lab 10/23/17  0509 10/24/17  0432   WBC 25.27* 35.47*   HGB 6.9* 9.7*   HCT 20.5* 28.3*   * 400*     Magnesium:   No results for input(s): MG in the last 48 hours.  POCT Glucose:     Recent Labs  Lab 10/24/17  1107 10/24/17  1202 10/24/17  1507   POCTGLUCOSE 131* 91 211*

## 2017-10-24 NOTE — PLAN OF CARE
JEFFREY spoke to Marilee with [facility]  They have accepted pt and spoken top pt's dtr about SNF.  JEFFREY requested [facility] orders from CM and sent PASRR and 142, through .  Plan for pt to transfer to [facility], tomorrow.      1552:  JEFFREY sent SNF orders to [facility]    SW updated CM.     [facility] [phone]     Bernarda Martell LCSW     [phone]  Critical Care (MICU)

## 2017-10-24 NOTE — ASSESSMENT & PLAN NOTE
- Did not get dose last night.   - Given 100 units long acting and 100 units short acting in 24 hour period yesterday and hypoglycemic this AM  -Keeping 27 units with meals as patient appears to have more hyperglycemia during day and will split detemir into BID dosing 25 units.   -Night resident instructed to hold for BG < 150 and recheck in 1 hour.

## 2017-10-24 NOTE — ASSESSMENT & PLAN NOTE
-Improved, now comfortable on NC oxygen, will monitor  -Patient has been diuresed extensively, holding fluids to keep lungs dry  Appears going + but unclear if Ins and Outs accurate  Will monitor oxygen requirements and titrate fluid status as needed

## 2017-10-24 NOTE — PLAN OF CARE
[facility]     Department of Hospital Medicine     [address] [place] [address]     [phone] after hours  [phone] fax       NURSING HOME ORDERS    10/24/2017    Admit to Nursing Home:  Skilled Bed                                                 Diagnoses:  Active Hospital Problems    Diagnosis  POA    *Acute on chronic respiratory failure with hypoxia [J96.21]  Yes     · Due to pt's relative obtundation, although improved, recommend transfer to ICU  · Despite pt's elevated creatinine, she needs aggressive diuresis - she responded well to 80mg lasix x1 but her CXR shows she has a long way to go and may need dialysis to effectively manage her volume  · Bipap with a PEEP minimum of [id number]  · Unclear etiology of respiratory failure at this time, need outside records ASAP. DDx includes , CAP, HCAP, CHF.  · Recommend broad spectrum abx and cultures at this time      Melena [K92.1]  Yes    Type 2 diabetes mellitus with hyperglycemia, with long-term current use of insulin [E11.65, Z79.4]  Not Applicable    Acute on chronic diastolic heart failure [I50.33]  Yes     · Known diastolic dysfunction  · Pt has pulmonary edema on CXR but no edema peripherally, +crackles in bases  · Recommend aggressive diuresis and HD/CRRT as necessary  · Recommend checking troponin to evaluate for ongoing ischemia in setting of known CAD      Type 2 diabetes mellitus with stage 3 chronic kidney disease, with long-term current use of insulin [E11.22, N18.3, Z79.4]  Not Applicable    Essential hypertension [I10]  Yes    HLD (hyperlipidemia) [E78.5]  Yes    Coronary artery disease involving native coronary artery of native heart without angina pectoris [I25.10]  Yes    Microcytic anemia [D50.9]  Yes     Last Assessment & Plan:   On admission patient's H&H was 7.9/22.4 and has remained stable throughout her hospitalization MCV was noted at 80.  After review of patient's  medical records it seems that her baseline 2 years ago was 10.  Anemia may be from recent acute illness but will further evaluate with a peripheral blood smear on the next CBC.  No need for transfusion at this time but will continue to monitor and transfuse for hemoglobin less than 7.  -Follow-up peripheral blood smear  -Continue to monitor        Resolved Hospital Problems    Diagnosis Date Resolved POA    Anginal chest pain at rest [I20.8] 10/24/2017 No    Urinary retention [R33.9] 10/24/2017 No    Hyperglycemic hyperosmolar nonketotic coma [E11.01] 10/20/2017 No    Acute metabolic encephalopathy [G93.41] 10/18/2017 Yes    Acute respiratory failure with hypoxia [J96.01] 10/14/2017 Yes    Diabetic neuropathy [E11.40] 10/13/2017 Yes       Patient is homebound due to:  Acute on chronic respiratory failure with hypoxia    Allergies:  Review of patient's allergies indicates:   Allergen Reactions    Codeine     Lortab [hydrocodone-acetaminophen] Nausea Only    Penicillins        Vitals:       Every shift (Skilled Nursing patients)    Diet: Dental Soft Diabetic Diet with nectar thick liquids       Acitivities:      - Up in a chair each morning as tolerated   - Ambulate with assistance to bathroom   - Scheduled walks once each shift (every 8 hours)   - May ambulate independently   - May use walker, cane, or self-propelled wheelchair   - Weight bearing:as tolerated    LABS:  Weekly CBC, if stable can restart Plavix per supervising physician recommendation    Nursing Precautions:  - Aspiration precautions:             -Per facility protocol   - Fall precautions per nursing home protocol   - Decubitus precautions:        -  for positioning   - Pressure reducing foam mattress   - Turn patient every two hours. Use wedge pillows to anchor patient    CONSULTS:    Physical Therapy to evaluate and treat     Occupational Therapy to evaluate and treat     Speech Therapy  to evaluate and treat      MISCELLANEOUS CARE:      DIABETES CARE:      Check blood sugar:       Fingerstick blood sugar AC and HS   Fingerstick blood sugar every 6 hours if unable to eat      Report CBG < 60 or > 400 to physician.                                          Insulin Sliding Scale          Glucose  Novolog Insulin Subcutaneous        0 - 60   Orange juice or glucose tablet, hold insulin      No insulin   201-250  2 units   251-300  4 units   301-350  6 units   351-400  8 units   >400   10 units then call physician      Medications: Discontinue all previous medication orders, if any. See new list below.   Edie Hays   Home Medication Instructions [id number]    Printed on:10/26/17 2808   Medication Information                      amlodipine (NORVASC) 10 MG tablet  Take 10 mg by mouth once daily.             aspirin (ECOTRIN) 81 MG EC tablet  Take 81 mg by mouth once daily.             buPROPion (WELLBUTRIN SR) 100 MG TBSR 12 hr tablet  Take 100 mg by mouth 2 (two) times daily.             clopidogrel (PLAVIX) 75 mg tablet  Take 1 tablet (75 mg total) by mouth once daily.             duloxetine (CYMBALTA) 60 MG capsule  Take 60 mg by mouth once daily.             ferrous sulfate 325 (65 FE) MG EC tablet  Take 325 mg by mouth once daily.             fluconazole (DIFLUCAN) 100 MG tablet  Take 1 tablet (100 mg total) by mouth once daily.             furosemide (LASIX) 20 MG tablet  Take 20 mg by mouth 2 (two) times daily.             gabapentin (NEURONTIN) 300 MG capsule  Take 300 mg by mouth 3 (three) times daily.             insulin aspart (NOVOLOG) 100 unit/mL InPn pen  Inject 27 Units into the skin 3 (three) times daily.             insulin aspart (NOVOLOG) 100 unit/mL InPn pen  Inject 30 Units into the skin 3 (three) times daily with meals.             insulin detemir (LEVEMIR FLEXTOUCH) 100 unit/mL (3 mL) SubQ InPn pen  Inject 25 Units into the skin 2 (two) times daily.             insulin detemir (LEVEMIR FLEXTOUCH) 100 unit/mL (3 mL)  SubQ InPn pen  Inject 25 Units into the skin 2 (two) times daily.             isosorbide mononitrate (IMDUR) 30 MG 24 hr tablet  Take 1 tablet (30 mg total) by mouth once daily.             metoprolol tartrate (LOPRESSOR) 25 MG tablet  Take 1 tablet (25 mg total) by mouth 2 (two) times daily.             nitroGLYCERIN (NITROSTAT) 0.4 MG SL tablet  Place 1 tablet (0.4 mg total) under the tongue every 5 (five) minutes as needed for Chest pain.             pantoprazole (PROTONIX) 40 MG tablet  Take 1 tablet (40 mg total) by mouth once daily.             predniSONE (DELTASONE) 20 MG tablet  Take 3 tablets (60 mg total) by mouth every 12 (twelve) hours.             rosuvastatin (CRESTOR) 20 MG tablet  Take 1 tablet (20 mg total) by mouth every evening.               _________________________________  Micheal Nevarez MD  10/24/2017

## 2017-10-24 NOTE — PLAN OF CARE
Problem: Physical Therapy Goal  Goal: Physical Therapy Goal  Goals to be met by: 10/27/2017    Patient will increase functional independence with mobility by performin. Supine to sit with MInimal Assistance - met 10/21  2. Sit to stand transfer with Moderate Assistance - met 10/21  3. Gait  x 50 feet with Moderate Assistance using Rolling Walker. - not met  4. Sitting at edge of bed x8 minutes with Minimal Assistance - met 10/19  5. Stand for 5 minutes with Minimal Assistance using Rolling Walker. - met 10/19  6. Lower extremity exercise program x15 reps per handout, with independence - met 10/21                     Goals remain appropriate.     Amanda Arteaga, PTA.  10/24/2017

## 2017-10-24 NOTE — DISCHARGE INSTRUCTIONS

## 2017-10-24 NOTE — PLAN OF CARE
Problem: Patient Care Overview  Goal: Plan of Care Review  Outcome: Ongoing (interventions implemented as appropriate)  Pt denies Chest pain, SOB or nausea. No falls, trauma or injury noted. VSS. H/H: 6.9- 1unit of blood administered. Will follow up am labs. BG management. Prednisone q 12hrs. NPO after MN. Plan for EGD in am. Plan of care reviewed with patient. No further questions at this time. No significant events. Will continue to monitor.

## 2017-10-24 NOTE — INTERVAL H&P NOTE
The patient has been examined and the H&P has been reviewed:    I concur with the findings and no changes have occurred since H&P was written.     History and Exam unchanged from visit.    Procedure - EGD  Neck - supple  Plan of anesthesia - General  ASA - per anesthesia  Mallampati - per anesthesia  Anesthesia problems - no  Family history of anesthesia problems - no      Anesthesia/Surgery risks, benefits and alternative options discussed and understood by patient/family.          Active Hospital Problems    Diagnosis  POA    *Acute on chronic respiratory failure with hypoxia [J96.21]  Yes     · Due to pt's relative obtundation, although improved, recommend transfer to ICU  · Despite pt's elevated creatinine, she needs aggressive diuresis - she responded well to 80mg lasix x1 but her CXR shows she has a long way to go and may need dialysis to effectively manage her volume  · Bipap with a PEEP minimum of [id number]  · Unclear etiology of respiratory failure at this time, need outside records ASAP. DDx includes , CAP, HCAP, CHF.  · Recommend broad spectrum abx and cultures at this time      Anginal chest pain at rest [I20.8]  No    Urinary retention [R33.9]  No    Type 2 diabetes mellitus with hyperglycemia, with long-term current use of insulin [E11.65, Z79.4]  Not Applicable    Acute on chronic diastolic heart failure [I50.33]  Yes     · Known diastolic dysfunction  · Pt has pulmonary edema on CXR but no edema peripherally, +crackles in bases  · Recommend aggressive diuresis and HD/CRRT as necessary  · Recommend checking troponin to evaluate for ongoing ischemia in setting of known CAD      Type 2 diabetes mellitus with stage 3 chronic kidney disease, with long-term current use of insulin [E11.22, N18.3, Z79.4]  Not Applicable    Essential hypertension [I10]  Yes    HLD (hyperlipidemia) [E78.5]  Yes    Coronary artery disease involving native coronary artery of native heart without angina pectoris  [I25.10]  Yes    Microcytic anemia [D50.9]  Yes     Last Assessment & Plan:   On admission patient's H&H was 7.9/22.4 and has remained stable throughout her hospitalization MCV was noted at 80.  After review of patient's medical records it seems that her baseline 2 years ago was 10.  Anemia may be from recent acute illness but will further evaluate with a peripheral blood smear on the next CBC.  No need for transfusion at this time but will continue to monitor and transfuse for hemoglobin less than 7.  -Follow-up peripheral blood smear  -Continue to monitor        Resolved Hospital Problems    Diagnosis Date Resolved POA    Hyperglycemic hyperosmolar nonketotic coma [E11.01] 10/20/2017 No    Acute metabolic encephalopathy [G93.41] 10/18/2017 Yes    Acute respiratory failure with hypoxia [J96.01] 10/14/2017 Yes    Diabetic neuropathy [E11.40] 10/13/2017 Yes

## 2017-10-24 NOTE — H&P (VIEW-ONLY)
[facility]  Gastroenterology  Consult Note    Patient Name: Edie Hays  MRN: [id number]  Admission Date: 10/13/2017  Hospital Length of Stay: 10 days  Code Status: Full Code   Attending Provider: Daisha Pantoja MD   Consulting Provider: Godfrey Dow MD  Primary Care Physician: Primary Doctor No  Principal Problem:Acute on chronic respiratory failure with hypoxia    Inpatient consult to Gastroenterology  Consult performed by: GODFREY DOW  Consult ordered by: ABHISHEK HUERTA        Subjective:     HPI:  This is a 71 yo F with cryptogenic organizing pneumonia (home oxygen; respondent to steroids), HTN, HLD, Type 2 diabetes (on insulin) with polyneuropathy and CKD stage 3 and CAD on DAPT was admitted to [facility] with acute hypoxic respiratory failure requiring initial hospitalization in ICU and intubated. Patient was transferred to [facility] for evaluation of acute hypoxic respiratory failure and pulmonology evaluation.  She was diagnosed with  in May 2017 at OSH and was started on steroids. She is currently on steroid taper, receiving 20mg po daily. Over the past few days, patient's hg has been downtrending from 9.1 on 10/18 down to 6.9 today. She was receiving 1UPRBC this afternoon. Patient does note that her stools have been black for the past few days. No previous history of GI bleed. No history of EGD or colonoscopy.    Past Medical History:   Diagnosis Date    BOOP (bronchiolitis obliterans with organizing pneumonia) 5/2/2017    Overview:  Diagnosed clinically via response to steroids    Breast cancer     Chronic diastolic heart failure     Coronary artery disease involving native coronary artery of native heart without angina pectoris 5/30/2017    Encounter for blood transfusion     Essential hypertension 5/31/2017    HLD (hyperlipidemia) 5/31/2017    NSTEMI (non-ST elevated myocardial infarction) 5/31/2017    Type 2 diabetes mellitus with diabetic  polyneuropathy, with long-term current use of insulin 5/31/2017    Last Assessment & Plan:  History of type 2 diabetes managed on basal bolus insulin at home.  She is currently receiving Lantus 35 units HS and Humalog 5 units with meals.  Blood glucose is stable ranging between 167-200.  We will continue to monitor as she is still receiving steroids making her glucose control difficult.This morning, patient had an accucheck documented at 60 which improved after o    Type 2 diabetes mellitus with stage 3 chronic kidney disease, with long-term current use of insulin 10/13/2017       Past Surgical History:   Procedure Laterality Date    MASTECTOMY         Review of patient's allergies indicates:   Allergen Reactions    Codeine     Lortab [hydrocodone-acetaminophen] Nausea Only    Penicillins      Family History     None        Social History Main Topics    Smoking status: Never Smoker    Smokeless tobacco: Not on file    Alcohol use Not on file    Drug use: Unknown    Sexual activity: Not on file     Review of Systems   Constitutional: Positive for appetite change and fatigue. Negative for activity change, chills and fever.   HENT: Negative for sore throat and trouble swallowing.    Respiratory: Negative for cough and shortness of breath.    Cardiovascular: Negative for chest pain and leg swelling.   Gastrointestinal: Negative for abdominal distention, abdominal pain, constipation, diarrhea, nausea and vomiting.   Genitourinary: Negative for decreased urine volume and difficulty urinating.   Musculoskeletal: Negative for arthralgias and back pain.   Neurological: Negative for dizziness and light-headedness.   Psychiatric/Behavioral: Negative for agitation and confusion.     Objective:     Vital Signs (Most Recent):  Temp: 98.1 °F (36.7 °C) (10/23/17 1645)  Pulse: 68 (10/23/17 1700)  Resp: 15 (10/23/17 1700)  BP: 120/65 (10/23/17 1700)  SpO2: 98 % (10/23/17 1700) Vital Signs (24h Range):  Temp:  [97.6 °F (36.4  °C)-98.1 °F (36.7 °C)] 98.1 °F (36.7 °C)  Pulse:  [63-78] 68  Resp:  [15-31] 15  SpO2:  [92 %-99 %] 98 %  BP: (109-125)/(57-68) 120/65     Weight: 85.5 kg (188 lb 7.9 oz) (10/18/17 1200)  Body mass index is 31.37 kg/m².      Intake/Output Summary (Last 24 hours) at 10/23/17 1748  Last data filed at 10/23/17 1645   Gross per 24 hour   Intake             1330 ml   Output              500 ml   Net              830 ml       Lines/Drains/Airways     Peripherally Inserted Central Catheter Line                 PICC Triple Lumen 10/14/17 left brachial 9 days                Physical Exam   Constitutional: She is oriented to person, place, and time.   Somnolent but arousable. In no apparent distress.   HENT:   Mouth/Throat: Oropharynx is clear and moist.   Eyes: No scleral icterus.   Cardiovascular: Normal rate, regular rhythm and normal heart sounds.    No murmur heard.  Pulmonary/Chest: Effort normal and breath sounds normal. She has no wheezes.   Abdominal: Soft. Bowel sounds are normal. She exhibits no distension and no mass. There is no tenderness. There is no guarding.   Musculoskeletal: She exhibits no edema or deformity.   Lymphadenopathy:     She has no cervical adenopathy.   Neurological: She is alert and oriented to person, place, and time.   Skin: Skin is warm and dry.   Psychiatric: She has a normal mood and affect.   Vitals reviewed.      Significant Labs:  CBC:   Recent Labs  Lab 10/22/17  0439 10/23/17  0509   WBC 22.82* 25.27*   HGB 7.2* 6.9*   HCT 21.8* 20.5*    355*     BMP:   Recent Labs  Lab 10/22/17  0439 10/23/17  0509   * 292*    137   K 4.4 4.7    103   CO2 27 26   BUN 82* 78*   CREATININE 2.1* 2.2*   CALCIUM 7.9* 7.9*   MG 2.3  --      Coagulation: No results for input(s): INR, APTT in the last 48 hours.    Invalid input(s): PT    Significant Imaging:  Imaging results within the past 24 hours have been reviewed.    Assessment/Plan:     Microcytic anemia    73 yo F with  cryptogenic organizing pneumonia (home oxygen; on steroids) CAD on DAPT with downtrending Hg and reports of black stool, concerning for PUD.    Recommendations:  - Consider stopping DAPT if worried about GI bleed  - Keep NPO after MN  - Will plan for EGD tomorrow  - Transfuse as necessary for goal Hg >7-8            Thank you for your consult.     Godfrey Martin MD PGY-IV  Gastroenterology Fellow  [facility]  P [phone]

## 2017-10-24 NOTE — PROGRESS NOTES
Blood sugar 80. Pt is on prednisone 60mg q 12hrs. Blood sugar goes 300s in am. Pt has scheduled 25units levemir. IM3 notified. Received an order to administer insulin. Will follow the order and monitor the patient.

## 2017-10-24 NOTE — NURSING TRANSFER
Nursing Transfer Note      10/24/2017     Transfer To: 390 from [facility] 37    Transfer via stretcher    Transfer with cardiac monitoring    Transported by PCT    Medicines sent: D50     Chart send with patient: Yes    Notified: RN    Patient reassessed at:1230    Upon arrival to floor: cardiac monitor applied, patient oriented to room, call bell in reach and bed in lowest position    RN GIVEN REPORT.

## 2017-10-24 NOTE — PROGRESS NOTES
Lab called regarding the Blood sugar being 26. Blood sugar was 26 from Accucheck. Recheck was 27. Pt is AAOX4, asymptomatic. IM 3, Dr. Burton notified. 25g dextrose administered per protocol. BG came back to 250. Will recheck the BG in next hour and notify the MD if needed.

## 2017-10-24 NOTE — PLAN OF CARE
Problem: Occupational Therapy Goal  Goal: Occupational Therapy Goal  Goals to be met by: 11/08/2017    Patient will increase functional independence with ADLs by performing:    UE Dressing with Supervision.  LE Dressing with Minimal Assistance.GOAL MET 10/24/17 - keep goal in place to carry over  Grooming while seated with Supervision. GOAL MET 10/24/17  ** NEW GOAL:  Grooming while standing with SBA  Toileting from bedside commode with Minimal Assistance for hygiene and clothing management.                         -goal revised: from toilet with Moderate Assistance   Sitting at edge of bed or bedside chair  x30 minutes with Supervision.  Rolling to Bilateral with Modified [place].                      - Rolling to L side: MOD I w/ side rail MET   Supine to sit with Supervision.  Stand pivot transfers with Supervision.  Toilet transfer to bedside commode with Supervision.                            - toilet        Outcome: Ongoing (interventions implemented as appropriate)    Pt was agreeable to OT and was noted to make progress towards her goals in therapy. During today's session, pt met 2 goals: LE dressing and grooming.  Pt's goals remain appropriate at this time.  She will continue to benefit from skilled OT services in order to assist her with increasing her safety and level of independence with self care and mobility tasks.     Stephanie De Luna, OT  10/24/2017

## 2017-10-24 NOTE — PLAN OF CARE
Referral sent to  1. [facility]  2. [facility]  3. [facility]

## 2017-10-24 NOTE — PLAN OF CARE
Problem: Patient Care Overview  Goal: Plan of Care Review  Outcome: Ongoing (interventions implemented as appropriate)  Pt remains free from falls and injury. Plan of care reviewed with pt. Pt understands plan. Pt denies pain, VSS. BG 93. Insulin held. Plans for EGD procedure today was discussed. Son at bedside. Will continue to monitor.

## 2017-10-24 NOTE — ASSESSMENT & PLAN NOTE
-labs consistent with iron deficiency  -continue ferrous sulfate 325  -patient required 2U transfusion at OSH  -will get CBC q daily and trend     Has steadily declined, s/p EGD today with mild bleeding   S/p 1 unit PRBCs yesterday with 3 point increase in Hgb

## 2017-10-24 NOTE — ANESTHESIA PREPROCEDURE EVALUATION
10/24/2017  Edie Hays is a 72 y.o., female.    Anesthesia Evaluation    I have reviewed the Patient Summary Reports.     I have reviewed the Medications.     Review of Systems  Anesthesia Hx:  No problems with previous Anesthesia  History of prior surgery of interest to airway management or planning:  Denies Personal Hx of Anesthesia complications.   Cardiovascular:   Hypertension Past MI CAD   Angina    Pulmonary:   Pneumonia COPD Pulmonary hypertension- PA pressures 50-60s   Renal/:   Chronic Renal Disease, CRI    Neurological:   Neuromuscular Disease,    Endocrine:   Diabetes, poorly controlled, type 2        Physical Exam  General:  Well nourished    Airway/Jaw/Neck:  Airway Findings: Mouth Opening: Normal Tongue: Normal  General Airway Assessment: Adult  Mallampati: II  TM Distance: Normal, at least 6 cm       Chest/Lungs:  Chest/Lungs Clear    Heart/Vascular:  Heart Findings: Normal            Anesthesia Plan  Type of Anesthesia, risks & benefits discussed:  Anesthesia Type:  general  Patient's Preference:   Intra-op Monitoring Plan:   Intra-op Monitoring Plan Comments:   Post Op Pain Control Plan: multimodal analgesia and IV/PO Opioids PRN  Post Op Pain Control Plan Comments: Opioids and analgesic adjuvants as needed  Induction:   IV  Beta Blocker:  Patient is on a Beta-Blocker and has received one dose within the past 24 hours (No further documentation required).       Informed Consent: Patient understands risks and agrees with Anesthesia plan.  Questions answered. Anesthesia consent signed with patient.  ASA Score: 3     Day of Surgery Review of History & Physical:    H&P update referred to the surgeon.     Anesthesia Plan Notes: I have personally evaluated the patient and discussed risk/benefits/alternatives of general anesthesia.    CONCLUSIONS     1 - Normal left ventricular systolic function  (EF 60-65%).     2 - Wall motion abnormalities.     3 - Indeterminate LV diastolic function.     4 - Normal right ventricular systolic function .     5 - Pulmonary hypertension. The estimated PA systolic pressure is 52 mmHg.     6 - Mild tricuspid regurgitation.         Ready For Surgery From Anesthesia Perspective.

## 2017-10-24 NOTE — PT/OT/SLP PROGRESS
Physical Therapy  Treatment    Edie Hays   MRN: [id number]   Admitting Diagnosis: Acute on chronic respiratory failure with hypoxia    PT Received On: 10/24/17  PT Start Time: 1505     PT Stop Time: 1529    PT Total Time (min): 24 min       Billable Minutes:  Gait Training 16 and Therapeutic Activity 8    Treatment Type: Treatment  PT/PTA: PTA     PTA Visit Number: 1       General Precautions: Standard, fall  Orthopedic Precautions: N/A   Braces: N/A    Do you have any cultural, spiritual, Zoroastrian conflicts, given your current situation?: none reported     Subjective:  Communicated with RN (Sebastian) prior to session.  Pt agreeable to PT session with encouragement from RN and PTA    Pain/Comfort  Pain Rating 1: 0/10  Location - Orientation 1: generalized  Location 1: head  Pain Addressed 1: Distraction, Cessation of Activity (Pt refused to request pain med)  Pain Rating Post-Intervention 1:  (NO rating provided)    Objective:   Patient found with: oxygen, telemetry (sup in bed with family member present in the room)      FUNCTIONAL MOBILITY    Bed Mobility (with vc's for sequencing and safe technique of functional mobility):        Rolling to the R with mod A no use of bedrail       Sup > sit at the EOB with mod A from R side lying        Sit > sup Not performed 2* pt was left seated [facility]       Scooting hips to the EOB upon sitting with min A x2 scoots           Transfers  (vc's for hand placement and safety of task)       Sit > stand from EOB with RW requiring mod A for hip elevation on 1st trial, all subsequent trials,               Pt requires min A from both EOB/BS chair       Stand > sit to EOB/BS chair requiring min A for controlled descent     Gait  Pt on supplemental O2 via NC       [facility] x3 trials with seated rest breaks in between       Assistive Device: RW       Assistance Required: CG/min A for balance       Verbal Cues required: for postural control, B step length, nayeli and  safety       Gait Deviations: Pt demonstrates increased nayeli, decreased B step length,                              decreased stride length, narrow base of support,                              decreased toe-to-floor clearance, decreased weight-shifting ability,                             foot flat, forward lean and downward gaze.  Pt with moderate SOB at the                              End of each trial    Education:  Education provided to pt regarding: upright posture, nayeli and safety.    Whiteboard updated with correct mobility information. RN/PCT notified.  Pt safe to transfer via SPT OOB with RN/PCT: Use no AD & mod A of 1 person.    AM-PAC 6 CLICK MOBILITY  How much help from another person does this patient currently need?   1 = Unable, Total/Dependent Assistance  2 = A lot, Maximum/Moderate Assistance  3 = A little, Minimum/Contact Guard/Supervision  4 = None, Modified [place]/Independent    Turning over in bed (including adjusting bedclothes, sheets and blankets)?: 2  Sitting down on and standing up from a chair with arms (e.g., wheelchair, bedside commode, etc.): 2  Moving from lying on back to sitting on the side of the bed?: 2  Moving to and from a bed to a chair (including a wheelchair)?: 3  Need to walk in hospital room?: 3  Climbing 3-5 steps with a railing?: 2  Total Score: 14    AM-PAC Raw Score CMS G-Code Modifier Level of Impairment Assistance   6 % Total / Unable   7 - 9 CM 80 - 100% Maximal Assist   10 - 14 CL 60 - 80% Moderate Assist   15 - 19 CK 40 - 60% Moderate Assist   20 - 22 CJ 20 - 40% Minimal Assist   23 CI 1-20% SBA / CGA   24 CH 0% Independent/ Mod I     Patient left up in chair with all lines intact, call button in reach, RN notified and family member present.    Assessment:  Edie Hays is a 72 y.o. female with a medical diagnosis of Acute on chronic respiratory failure with hypoxia and presents with generalized weakness, limited endurance and moderate SOB on  exertion requiring assistance and cueing for safety and to prevent falls, especially during gait.  Pt will cont to benefit from skilled PT intervention to address deficits and improve functional mobility.    Rehab identified problem list/impairments: Rehab identified problem list/impairments: weakness, impaired endurance, impaired self care skills, impaired functional mobilty, gait instability, impaired balance, pain, impaired cardiopulmonary response to activity    Rehab potential is good.    Activity tolerance: Fair    Discharge recommendations: Discharge Facility/Level Of Care Needs: nursing facility, skilled     Barriers to discharge: Barriers to Discharge: Decreased caregiver support    Equipment recommendations: Equipment Needed After Discharge: walker, rolling     GOALS:    Physical Therapy Goals        Problem: Physical Therapy Goal    Goal Priority Disciplines Outcome Goal Variances Interventions   Physical Therapy Goal     PT/OT, PT Ongoing (interventions implemented as appropriate)     Description:  Goals to be met by: 10/27/2017    Patient will increase functional independence with mobility by performin. Supine to sit with MInimal Assistance - met 10/21  2. Sit to stand transfer with Moderate Assistance - met 10/21  3. Gait  x 50 feet with Moderate Assistance using Rolling Walker. - not met  4. Sitting at edge of bed x8 minutes with Minimal Assistance - met 10/19  5. Stand for 5 minutes with Minimal Assistance using Rolling Walker. - met 10/19  6. Lower extremity exercise program x15 reps per handout, with independence - met 10/21                                PLAN:    Patient to be seen 5 x/week  to address the above listed problems via gait training, therapeutic activities, therapeutic exercises, neuromuscular re-education  Plan of Care expires: 17  Plan of Care reviewed with: patient, family         Amanda Arteaga, PTA  10/24/2017

## 2017-10-24 NOTE — PATIENT INSTRUCTIONS
Discharge Summary/Instructions after an Endoscopic Procedure  Patient Name: Edie Hays  Patient MRN: [id number]  Patient YOB: 1945  Tuesday, October 24, 2017  Aajy Stovall MD  RESTRICTIONS:  During your procedure today, you received medications for sedation.  These   medications may affect your judgment, balance and coordination.  Therefore,   for 24 hours, you have the following restrictions:   - DO NOT drive a car, operate machinery, make legal/financial decisions,   sign important papers or drink alcohol.    ACTIVITY:  The following day: return to full activity including work, except no heavy   lifting, straining or running for 3 days if polyps were removed.  DIET:  Eat and drink normally unless instructed otherwise.     TREATMENT FOR COMMON SIDE EFFECTS:  - Mild abdominal pain, belching, bloating or excessive gas: rest, eat   lightly and use a heating pad.  - Sore Throat: treat with throat lozenges and/or gargle with warm salt   water.  SYMPTOMS TO WATCH FOR AND REPORT TO YOUR PHYSICIAN:  1. Abdominal pain or bloating, other than gas cramps.  2. Chest pain.  3. Back pain.  4. Chills or fever occurring within 24 hours after the procedure.  5. Rectal bleeding, which would show as bright red, maroon, or black stools.   (A tablespoon of blood from the rectum is not serious, especially if   hemorrhoids are present.)  6. Vomiting.  7. Weakness or dizziness.  8. Because air was used during the procedure, expelling large amounts of air   from your rectum or belching is normal.  9. If a bowel prep was taken, you may not have a bowel movement for 1-3   days.  This is normal.  GO DIRECTLY TO THE NEAREST EMERGENCY ROOM IF YOU HAVE ANY OF THE FOLLOWING:      Difficulty breathing  Chills and/or fever over 101 F   Persistent vomiting and/or vomiting blood   Severe abdominal pain   Severe chest pain   Black, tarry stools   Bleeding- more than one tablespoon  Your doctor recommends these additional instructions:  If any  biopsies were taken, your doctors clinic will contact you in 1 to 2   weeks with any results.  Your physician has recommended an H. pylori serology at appointment to be   scheduled.   Take Diflucan (fluconazole) 100 mg by mouth once a day for two weeks.   The findings and recommendations were discussed with your referring   physician.   A proton pump inhibitor medication will be prescribed to be taken by mouth   once a day indefinitely.  For questions, problems or results please call your physician - Ajay Stovall MD at Work:  [phone].  [facility], EMERGENCY ROOM PHONE NUMBER: [phone]  IF A COMPLICATION OR EMERGENCY SITUATION ARISES AND YOU ARE UNABLE TO REACH   YOUR PHYSICIAN - GO DIRECTLY TO THE EMERGENCY ROOM.  Ajay Stovall MD  10/24/2017 11:33:05 AM  This report has been verified and signed electronically.

## 2017-10-24 NOTE — PT/OT/SLP PROGRESS
"Occupational Therapy  Treatment    Edie Hays   MRN: [id number]   Admitting Diagnosis: Acute on chronic respiratory failure with hypoxia    OT Date of Treatment: 10/24/17   OT Start Time: 0930  OT Stop Time: 1008  OT Total Time (min): 38 min    Billable Minutes:  Self Care/Home Management 30 and Therapeutic Activity 8    General Precautions: Standard, fall  Orthopedic Precautions: N/A  Braces: N/A         Subjective:  Communicated with nurse prior to session.  "I am so cold!"    Pain/Comfort  Pain Rating 1: 0/10  Pain Rating Post-Intervention 1: 0/10    Objective:  Patient found with: telemetry, oxygen     Functional Mobility:  Bed Mobility:  Rolling/Turning to Left: Supervision, With side rail  Scooting/Bridging: Stand by Assistance, With side rail  Supine to Sit: Minimum Assistance (min A to bring trunk all the way upright)    Transfers:   Sit <> Stand Assistance: Minimum Assistance  Sit <> Stand Assistive Device: Rolling Walker  Bed <> Chair Technique: Stand Pivot  Bed <> Chair Transfer Assistance: Minimum Assistance  Bed <> Chair Assistive Device: Rolling Walker  Toilet Transfer Technique: Stand Pivot  Toilet Transfer Assistance: Moderate Assistance  Toilet Transfer Assistive Device: Rolling Walker      Activities of Daily Living:  Feeding Level of Assistance: Activity did not occur    LE Dressing Level of Assistance: Minimum assistance (Min a to abida/doff socks to adjust sock once on - pt crossed legs with cues and able to reach feet to perform task - pt needed increased time and rest between feet)    Grooming Position: Seated  Grooming Level of Assistance: Supervision (set up assist to wash face and hands with wash cloth)     Toileting Where Assessed: Toilet  Toileting Level of Assistance: Contact guard (CGA when standing)        Balance:   Static Sit: GOOD-: Takes MODERATE challenges from all directions but inconsistently  Dynamic Sit: GOOD-: Maintains balance through MODERATE excursions of active trunk " "movement,     Static Stand: FAIR: Maintains without assist but unable to take challenges  Dynamic stand: FAIR: Needs CONTACT GUARD during gait    Therapeutic Activities and Exercises:  · Pt completed ADLs and func mobility activities for tx session as noted above  · Pt required increased time to complete bed mobility and transfers but able to complete with less assistance  · Whiteboard updated  · Pt educated on role of OT and POC      AM-PAC 6 CLICK ADL   How much help from another person does this patient currently need?   1 = Unable, Total/Dependent Assistance  2 = A lot, Maximum/Moderate Assistance  3 = A little, Minimum/Contact Guard/Supervision  4 = None, Modified [place]/Independent    Putting on and taking off regular lower body clothing? : 3  Bathing (including washing, rinsing, drying)?: 3  Toileting, which includes using toilet, bedpan, or urinal? : 3  Putting on and taking off regular upper body clothing?: 3  Taking care of personal grooming such as brushing teeth?: 3  Eating meals?: 2  Total Score: 17     AM-PAC Raw Score CMS "G-Code Modifier Level of Impairment Assistance   6 % Total / Unable   7 - 8 CM 80 - 100% Maximal Assist   9-13 CL 60 - 80% Moderate Assist   14 - 19 CK 40 - 60% Moderate Assist   20 - 22 CJ 20 - 40% Minimal Assist   23 CI 1-20% SBA / CGA   24 CH 0% Independent/ Mod I       Patient left up in chair with all lines intact, call button in reach and son present and nurse notified    ASSESSMENT:  Edie Hays is a 72 y.o. female with a medical diagnosis of Acute on chronic respiratory failure with hypoxia and presents with deficits as noted below.  Pt was agreeable to OT and was noted to make progress towards her goals in therapy. During today's session, pt met 2 goals: LE dressing and grooming.  Pt's goals remain appropriate at this time. She continues to require increase time to complete all mobility tasks and fatigues easily due to decreased strength/endurance.  She will " continue to benefit from skilled OT services in order to assist her with increasing her safety and level of independence with self care and mobility tasks.         Rehab identified problem list/impairments: Rehab identified problem list/impairments: weakness, impaired endurance, impaired self care skills, impaired functional mobilty, gait instability, impaired balance, decreased upper extremity function, decreased lower extremity function, decreased safety awareness, impaired coordination, impaired cardiopulmonary response to activity    Rehab potential is good.    Activity tolerance: Fair    Discharge recommendations: Discharge Facility/Level Of Care Needs: nursing facility, skilled     Barriers to discharge: Barriers to Discharge: Decreased caregiver support    Equipment recommendations: walker, rolling     GOALS:    Occupational Therapy Goals        Problem: Occupational Therapy Goal    Goal Priority Disciplines Outcome Interventions   Occupational Therapy Goal     OT, PT/OT Ongoing (interventions implemented as appropriate)    Description:  Goals to be met by: 11/08/2017    Patient will increase functional independence with ADLs by performing:    UE Dressing with Supervision.  LE Dressing with Minimal Assistance.GOAL MET 10/24/17 - keep goal in place to carry over  Grooming while seated with Supervision. GOAL MET 10/24/17  ** NEW GOAL:  Grooming while standing with SBA  Toileting from bedside commode with Minimal Assistance for hygiene and clothing management.                         -goal revised: from toilet with Moderate Assistance   Sitting at edge of bed or bedside chair  x30 minutes with Supervision.  Rolling to Bilateral with Modified [place].                      - Rolling to L side: MOD I w/ side rail MET   Supine to sit with Supervision.  Stand pivot transfers with Supervision.  Toilet transfer to bedside commode with Supervision.                            - toilet                           Plan:  Patient to be seen 4 x/week to address the above listed problems via self-care/home management, therapeutic activities, therapeutic exercises  Plan of Care expires: 11/07/17  Plan of Care reviewed with: patient, son         Stephanie De Luna, OT  10/24/2017

## 2017-10-24 NOTE — TRANSFER OF CARE
"Anesthesia Transfer of Care Note    Patient: Edie Hays    Procedure(s) Performed: Procedure(s) (LRB):  ESOPHAGOGASTRODUODENOSCOPY (EGD) (N/A)    Patient location: [facility]    Anesthesia Type: general    Transport from OR: Transported from OR on 2-3 L/min O2 by NC with adequate spontaneous ventilation    Post pain: adequate analgesia    Post assessment: no apparent anesthetic complications    Post vital signs: stable    Level of consciousness: awake    Nausea/Vomiting: no nausea/vomiting    Complications: none    Transfer of care protocol was followed      Last vitals:   Visit Vitals  BP (!) 119/51 (BP Location: Left arm, Patient Position: Lying)   Pulse (!) 57   Temp 36.2 °C (97.2 °F) (Temporal)   Resp 15   Ht 5' 5" (1.651 m)   Wt 85.5 kg (188 lb 7.9 oz)   LMP  (LMP Unknown)   SpO2 100%   Breastfeeding? No   BMI 31.37 kg/m²     "

## 2017-10-24 NOTE — PROGRESS NOTES
[facility] Hospital Medicine  Progress Note    Patient Name: Edie Hays  MRN: [id number]  Patient Class: IP- Inpatient   Admission Date: 10/13/2017  Length of Stay: 11 days  Attending Physician: Manan Friedman MD  Primary Care Provider: Primary Doctor [facility] Medicine Team: [facility] 3 Micheal Nevarez MD    Subjective:     Principal Problem:Acute on chronic respiratory failure with hypoxia    HPI:  71 y/o female with past medical history of cryptogenic pneumonia (home oxygen; respondent to steroids), HTN, HLP, Type 2 diabetes (on insulin) with polyneuropathy and CKD stage 3 and CAD was admitted to [facility] with acute hypoxic respiratory failure requiring initial hospitalization in ICU and intubated.  Patient has been [place] [facility] [place] of acute hypoxic respiratory failure and pulmonary consult.     Patients cytogenic PNA was first diagnosed in 5/2017 at [facility]. Patient was placed on a steroid taper from 60mg to 30mg daily, continue taper 10mg every 3 days, 20mg daily.    Patient was originally admitted to [facility] on 10/7/17 with septic shock. Patient required intubation with ICU level care. She was treated with zyvox, levoquin and maxipime.  She was extubated and required intermittent BiPAP and oxygen for her respiratory failure. [facility]  Sputum culture with normal growth. Urine culture negative.  BCx X2 were NGTD. Attempts have been made to diuresis patient with IV lasix but resulted in worsening of renal function so have backed off diuresis and trying to use IV Diuril without much success. Due to this, patient was transferred [place] [facility] [place] higher level of care and Pulmonary.    Of note, patient has 3 vessel CAD with negative PET stress for perfusion defects done here at [facility] so CABG was not indicated     Hospital Course:  Pt stepped down to hospital medicine and required IV  insulin infusion as blood glucose levels incredibly difficult to control. Pt now transitioned to SQ with goal glucose 140-180 (detemir 60U QHS now and aspart 27U with meals). Evaluated by cardiology for episode of chest pain and lopressor and imdur added for HR control and anti-anginal effect. Patient tolerating a diet without any further abdominal or chest pain and working with PT/OT, stable for SNF placement and to be transferred on Monday.     Interval History: BG this AM was 25. Given amp of D50 and came up to 250. Remained low throughout the day so all insulin held. Over 200 by mid afternoon so mealtime restarted. Was NPO for procedure for much of the day.     S/p EGD today. Esophageal candidiasis and erosive gastropathy found. Recommend fluconazole and PPI.     Review of Systems   Constitutional: Negative for chills and fever.   HENT: Negative for congestion and sore throat.    Eyes: Negative for photophobia and visual disturbance.   Respiratory: Negative for cough and shortness of breath.    Cardiovascular: Negative for chest pain, palpitations and leg swelling.   Gastrointestinal: Negative for abdominal pain, constipation, diarrhea, nausea and vomiting.   Genitourinary: Negative for decreased urine volume, dysuria and hematuria.   Musculoskeletal: Negative for arthralgias and myalgias.   Skin: Negative for rash and wound.   Neurological: Negative for syncope and headaches.   Psychiatric/Behavioral: Negative for agitation and confusion.     Objective:     Vital Signs (Most Recent):  Temp: 97.9 °F (36.6 °C) (10/24/17 1220)  Pulse: 67 (10/24/17 1550)  Resp: 16 (10/24/17 1550)  BP: (!) 126/42 (10/24/17 1220)  SpO2: 97 % (10/24/17 1550) Vital Signs (24h Range):  Temp:  [96.8 °F (36 °C)-98.5 °F (36.9 °C)] 97.9 °F (36.6 °C)  Pulse:  [55-69] 67  Resp:  [14-20] 16  SpO2:  [96 %-100 %] 97 %  BP: (110-148)/(42-75) 126/42     Weight: 85.5 kg (188 lb 7.9 oz)  Body mass index is 31.37 kg/m².    Intake/Output Summary (Last  24 hours) at 10/24/17 1634  Last data filed at 10/24/17 1400   Gross per 24 hour   Intake             1505 ml   Output              300 ml   Net             1205 ml      Physical Exam   Constitutional: She is oriented to person, place, and time. No distress.   Thin, elderly female. Not distressed. Not somnolent. A and Oriented x 3.    HENT:   Head: Normocephalic and atraumatic.   Eyes: Conjunctivae and EOM are normal.   Neck: Normal range of motion. Neck supple. No JVD present.   Cardiovascular: Normal rate and regular rhythm.    Murmur (3/6 systolic murmur best heard over parasternal area) heard.  Pulmonary/Chest: Effort normal and breath sounds normal. No respiratory distress. She has no wheezes.   Abdominal: Soft. Bowel sounds are normal. She exhibits no distension. There is no tenderness. There is no guarding.   Musculoskeletal: She exhibits no edema, tenderness or deformity.   S/p L mastectomy (with lymphectomy)   Neurological: She is alert and oriented to person, place, and time. No cranial nerve deficit.   Skin: Skin is warm and dry. She is not diaphoretic.   Psychiatric: She has a normal mood and affect.       Significant Labs:   BMP:     Recent Labs  Lab 10/24/17  0432   GLU 25*      K 4.2      CO2 27   BUN 69*   CREATININE 2.0*   CALCIUM 8.5*     CBC:     Recent Labs  Lab 10/23/17  0509 10/24/17  0432   WBC 25.27* 35.47*   HGB 6.9* 9.7*   HCT 20.5* 28.3*   * 400*     Magnesium:   No results for input(s): MG in the last 48 hours.  POCT Glucose:     Recent Labs  Lab 10/24/17  1107 10/24/17  1202 10/24/17  1507   POCTGLUCOSE 131* 91 211*     Assessment/Plan:      * Acute on chronic respiratory failure with hypoxia    -Improved, now comfortable on NC oxygen, will monitor  -Patient has been diuresed extensively, holding fluids to keep lungs dry  Appears going + but unclear if Ins and Outs accurate  Will monitor oxygen requirements and titrate fluid status as needed          Type 2 diabetes  mellitus with hyperglycemia, with long-term current use of insulin    - Did not get dose last night.   - Given 100 units long acting and 100 units short acting in 24 hour period yesterday and hypoglycemic this AM  -Keeping 27 units with meals as patient appears to have more hyperglycemia during day and will split detemir into BID dosing 25 units.   -Night resident instructed to hold for BG < 150 and recheck in 1 hour.           Type 2 diabetes mellitus with stage 3 chronic kidney disease, with long-term current use of insulin    -Cr stable in low 2s.   -Suspect 2/2 aggressive diuresis.   -will closely monitor with daily BMPs          Acute on chronic diastolic heart failure    -patient had a repeat echo on 10/7/17 which showed LVEF 60-65%, PASP 70-75, moderate TR  -Venous US R LE was negative for DVT  -patient was treated at OSH with Bumex 2mg IV x1 day and albumin 1 hour prior  -cardiology was consulted; they reviewed CT and CXR images and due to these findings and bump in Cr after diuresis, they felt that the resp failure was more like PNA in etiology.    -patient was subsequently transferred here for higher level of care/pulmonary lavage  -S/p significant IV diuresis, elevated creatinine but improving. Diuresis stopped as feel patient's lung exam is secondary to chronic disease and not fluid overload.   Cr stable          Microcytic anemia    -labs consistent with iron deficiency  -continue ferrous sulfate 325  -patient required 2U transfusion at OSH  -will get CBC q daily and trend     Has steadily declined, s/p EGD today with mild bleeding   S/p 1 unit PRBCs yesterday with 3 point increase in Hgb          HLD (hyperlipidemia)    -continue with crestor 20mg        Essential hypertension    on amlodipine 10mg + imdur, metoprolol  Will monitor and adjust as needed.           Coronary artery disease involving native coronary artery of native heart without angina pectoris    Acute epigastric pain overnight  10/19/17  Worked up for cardiac cause with EKG and serial troponins  Appreciate cardiology recs  Starting BBlocker for goal HR 50-60  Continuing ASA, statin, Plavix  Starting imdur  Will monitor BP and titrate          VTE Risk Mitigation         Ordered     Place sequential compression device  Until discontinued      10/13/17 2324     Medium Risk of VTE  Once      10/13/17 2316          Micheal Nevarez MD   Internal Medicine PGY-1  [phone]

## 2017-10-25 NOTE — ASSESSMENT & PLAN NOTE
-labs consistent with iron deficiency  -continue ferrous sulfate 325  -patient required 2U transfusion at OSH  -will get CBC q daily and trend     Has steadily declined, s/p EGD yesterday with mild bleeding   S/p 1 unit PRBCs yesterday with 3 point increase in Hgb

## 2017-10-25 NOTE — ASSESSMENT & PLAN NOTE
Acute epigastric pain overnight 10/19/17  Worked up for cardiac cause with EKG and serial troponins  Appreciate cardiology recs  Starting BBlocker for goal HR 50-60  Continuing ASA, statin, Holding Plavix in setting of GI bleed. Repeat CBC in one week and can consider restarting Plavix  Starting imdur  Will monitor BP and titrate

## 2017-10-25 NOTE — NURSING
Pt in bed on O2, Sats 97% on 2 L    Pt in bed off O2, Sats 96%    Pt was able to sit on side of bed without oxygen. Pt became SOB with Sats 85% on RA. Unable to walk pt without O2. VVS Will continue to monitor.

## 2017-10-25 NOTE — PLAN OF CARE
Problem: Patient Care Overview  Goal: Plan of Care Review  Outcome: Ongoing (interventions implemented as appropriate)  Pt denies Chest pain, SOB or nausea. No falls, trauma or injury noted. VSS. H/H: 9.7. Accucheck: 190, ACHS. BG management. Prednisone q 12hrs. EGD done. Plan for fluconazole po for 2 weeks. Plan of care reviewed with patient. No further questions at this time. No significant events. Son at bedside. Will continue to monitor.

## 2017-10-25 NOTE — PLAN OF CARE
Problem: Physical Therapy Goal  Goal: Physical Therapy Goal  Goals to be met by: 17    Patient will increase functional independence with mobility by performin. Supine to sit with MInimal Assistance - met 10/21       Revised: Supine <>sit with stand by assistance   2. Sit to stand transfer with Moderate Assistance - met 10/21       Revised: Sit>stand transfer with stand by assistance   3. Gait  x 50 feet with Moderate Assistance using Rolling Walker.   4. Sitting at edge of bed x8 minutes with Minimal Assistance - met 10/19  5. Stand for 5 minutes with Minimal Assistance using Rolling Walker. - met 10/19  6. Lower extremity exercise program x15 reps per handout, with independence - met 10/21               Outcome: Ongoing (interventions implemented as appropriate)  Goals updated; continue current POC.     Shakila Munguia PT, DPT   10/25/2017  Pager: [phone]

## 2017-10-25 NOTE — SUBJECTIVE & OBJECTIVE
Interval History: NAEON. S/p EGD yesterday which showed candidiasis and gastropathy. No epigastric or chest pain. No other symptoms reported.     Review of Systems   Constitutional: Negative for chills and fever.   HENT: Negative for congestion and sore throat.    Eyes: Negative for photophobia and visual disturbance.   Respiratory: Negative for cough and shortness of breath.    Cardiovascular: Negative for chest pain, palpitations and leg swelling.   Gastrointestinal: Negative for abdominal pain, constipation, diarrhea, nausea and vomiting.   Genitourinary: Negative for decreased urine volume, dysuria and hematuria.   Musculoskeletal: Negative for arthralgias and myalgias.   Skin: Negative for rash and wound.   Neurological: Negative for syncope and headaches.   Psychiatric/Behavioral: Negative for agitation and confusion.     Objective:     Vital Signs (Most Recent):  Temp: 98.7 °F (37.1 °C) (10/25/17 0910)  Pulse: 73 (10/25/17 1500)  Resp: 19 (10/25/17 1406)  BP: (!) 121/53 (10/25/17 1406)  SpO2: 99 % (10/25/17 1406) Vital Signs (24h Range):  Temp:  [98.1 °F (36.7 °C)-98.7 °F (37.1 °C)] 98.7 °F (37.1 °C)  Pulse:  [67-75] 73  Resp:  [17-22] 19  SpO2:  [91 %-99 %] 99 %  BP: (121-144)/(53-63) 121/53     Weight: 85.5 kg (188 lb 7.9 oz)  Body mass index is 31.37 kg/m².    Intake/Output Summary (Last 24 hours) at 10/25/17 1656  Last data filed at 10/25/17 1500   Gross per 24 hour   Intake             1698 ml   Output             2050 ml   Net             -352 ml      Physical Exam   Constitutional: She is oriented to person, place, and time. No distress.   Thin, elderly female. Not distressed. Not somnolent. A and Oriented x 3.    HENT:   Head: Normocephalic and atraumatic.   Eyes: Conjunctivae and EOM are normal.   Neck: Normal range of motion. Neck supple. No JVD present.   Cardiovascular: Normal rate and regular rhythm.    Murmur (3/6 systolic murmur best heard over parasternal area) heard.  Pulmonary/Chest: Effort  normal and breath sounds normal. No respiratory distress. She has no wheezes.   Abdominal: Soft. Bowel sounds are normal. She exhibits no distension. There is no tenderness. There is no guarding.   Musculoskeletal: She exhibits no edema, tenderness or deformity.   Neurological: She is alert and oriented to person, place, and time. No cranial nerve deficit.   Skin: Skin is warm and dry. She is not diaphoretic.   Psychiatric: She has a normal mood and affect.       Significant Labs:   BMP:     Recent Labs  Lab 10/25/17  0550   *  124*     138   K 4.5  4.5     105   CO2 24  24   BUN 61*  61*   CREATININE 2.0*  2.0*   CALCIUM 8.0*  8.0*     CBC:     Recent Labs  Lab 10/24/17  0432 10/25/17  0550   WBC 35.47* 22.03*  22.03*   HGB 9.7* 8.4*  8.4*   HCT 28.3* 25.1*  25.1*   * 353*  353*     Magnesium:   No results for input(s): MG in the last 48 hours.  POCT Glucose:     Recent Labs  Lab 10/24/17  1713 10/24/17  2040 10/25/17  1405   POCTGLUCOSE 265* 190* 264*

## 2017-10-25 NOTE — PLAN OF CARE
Problem: Patient Care Overview  Goal: Plan of Care Review  Outcome: Ongoing (interventions implemented as appropriate)  Pt remains free from falls and injury. Plan of care reviewed with pt. Pt understands plan. Pt denies pain, VSS. Fluconazole PO started this morning.  this AM. Will continue to monitor.

## 2017-10-25 NOTE — PLAN OF CARE
JEFFREY spoke to [facility] with [facility] ([phone]).  She submitted to [employer] and is still awtg auth. JEFFREY updated CM.  Plan for pt to transfer to SNF, tomorrow.    Bernarda Martell LCSW     [phone]  Critical Care (MICU)

## 2017-10-25 NOTE — ASSESSMENT & PLAN NOTE
-Improved, now comfortable on NC oxygen, high 90s on room air. De saturated when sat on side of bed to mid 80s.   -Patient has been diuresed extensively, holding fluids to keep lungs dry  Appears going + but clinically requiring less oxygen  Will monitor oxygen requirements and titrate fluid status as needed  Will continue prednisone at current dosage and have pulm f/u

## 2017-10-25 NOTE — PT/OT/SLP PROGRESS
Physical Therapy  Treatment    Edie Hays   MRN: [id number]   Admitting Diagnosis: Acute on chronic respiratory failure with hypoxia    PT Received On: 10/25/17  PT Start Time: 0951     PT Stop Time: 1021    PT Total Time (min): 30 min  (Co-tx with OT)     Billable Minutes:   Therapeutic Activity 25 min    Treatment Type: Treatment  PT/PTA: PT     PTA Visit Number: 0       General Precautions: Standard, fall  Orthopedic Precautions: N/A   Braces: N/A    Do you have any cultural, spiritual, Gnosticist conflicts, given your current situation?: none reported     Subjective:  Communicated with RN prior to session. Pt agreeable to participate in therapy session.     Pain/Comfort  Pain Rating 1:  (Pt did not rate pain on numeric scale)  Location - Orientation 1: generalized  Location 1:  (headache)  Pain Addressed 1: Reposition, Distraction, Nurse notified    Objective:   Patient found with: telemetry, pulse ox (continuous)    Functional Mobility:  Bed Mobility:    N/T 2/2 pt sitting up in chair upon PT arrival     Transfers:  Sit <> Stand Assistance: Moderate Assistance (from bedside chair x 4 trials )  Sit <> Stand Assistive Device: Rolling Walker    Gait:   Gait Distance: Pt ambulated ~5 ft from bedside chair>sink with min A for balance, safety and RW management   Assistance 1: Minimum assistance  Gait Assistive Device: Rolling walker  Gait Pattern: reciprocal  Gait Deviation(s): decreased nayeli, decreased step length, decreased stride length, decreased weight-shifting ability    Balance:   Static Sit: FAIR: Maintains without assist, but unable to take any challenges   Dynamic Sit: FAIR: Cannot move trunk without losing balance  Static Stand: POOR+: Needs MINIMAL assist to maintain  Dynamic stand: POOR    Therapeutic Activities and Exercises:  Therapeutic activities aimed to:  - increase pt's independence, safety, and efficiency with functional transfers. See above for assistance levels.   - improve endurance/standing  tolerance: pt tolerated standing ~20-30 second intervals for first 3 trials of standing; on 4th trial, pt tolerated standing ~1 minute while performing teeth brushing at sink. Pt required verbal & tactile cueing for safety awareness and posture.   - improve BLE functional strength through repetitions of sit>stand from low surface (bedside chair) and education on seated therex.     AM-PAC 6 CLICK MOBILITY  How much help from another person does this patient currently need?   1 = Unable, Total/Dependent Assistance  2 = A lot, Maximum/Moderate Assistance  3 = A little, Minimum/Contact Guard/Supervision  4 = None, Modified [place]/Independent    Turning over in bed (including adjusting bedclothes, sheets and blankets)?: 2  Sitting down on and standing up from a chair with arms (e.g., wheelchair, bedside commode, etc.): 2  Moving from lying on back to sitting on the side of the bed?: 2  Moving to and from a bed to a chair (including a wheelchair)?: 3  Need to walk in hospital room?: 3  Climbing 3-5 steps with a railing?: 1  Total Score: 13    AM-PAC Raw Score CMS G-Code Modifier Level of Impairment Assistance   6 % Total / Unable   7 - 9 CM 80 - 100% Maximal Assist   10 - 14 CL 60 - 80% Moderate Assist   15 - 19 CK 40 - 60% Moderate Assist   20 - 22 CJ 20 - 40% Minimal Assist   23 CI 1-20% SBA / CGA   24 CH 0% Independent/ Mod I     Patient left up in chair with all lines intact, call button in reach, RN notified and son present.    Assessment:  Edie Hays is a 72 y.o. female with a medical diagnosis of Acute on chronic respiratory failure with hypoxia. Pt with limited progress this visit 2/2 headache and increased fatigue. Pt with decreased standing tolerance and gait instability. Pt would benefit from continued PT intervention to address below listed deficits and maximize return to PLOF.       Rehab identified problem list/impairments: Rehab identified problem list/impairments: weakness, impaired  endurance, impaired self care skills, impaired functional mobilty, gait instability, impaired balance, decreased safety awareness, impaired cardiopulmonary response to activity    Rehab potential is good.    Activity tolerance: Good    Discharge recommendations: Discharge Facility/Level Of Care Needs: nursing facility, skilled     Barriers to discharge: Barriers to Discharge: Decreased caregiver support    Equipment recommendations: Equipment Needed After Discharge: walker, rolling     GOALS:    Physical Therapy Goals        Problem: Physical Therapy Goal    Goal Priority Disciplines Outcome Goal Variances Interventions   Physical Therapy Goal     PT/OT, PT Ongoing (interventions implemented as appropriate)     Description:  Goals to be met by: 17    Patient will increase functional independence with mobility by performin. Supine to sit with MInimal Assistance - met 10/21       Revised: Supine <>sit with stand by assistance   2. Sit to stand transfer with Moderate Assistance - met 10/21       Revised: Sit>stand transfer with stand by assistance   3. Gait  x 50 feet with Moderate Assistance using Rolling Walker.   4. Sitting at edge of bed x8 minutes with Minimal Assistance - met 10/19  5. Stand for 5 minutes with Minimal Assistance using Rolling Walker. - met 10/19  6. Lower extremity exercise program x15 reps per handout, with independence - met 10/21                                 PLAN:    Patient to be seen 5 x/week  to address the above listed problems via gait training, therapeutic activities, therapeutic exercises, neuromuscular re-education  Plan of Care expires: 17  Plan of Care reviewed with: patient, jose Jhaveri Ezra, PT, DPT   10/25/2017  Pager: [phone]

## 2017-10-25 NOTE — PROGRESS NOTES
[facility] Hospital Medicine  Progress Note    Patient Name: Edie Hays  MRN: [id number]  Patient Class: IP- Inpatient   Admission Date: 10/13/2017  Length of Stay: 12 days  Attending Physician: Manan Friedman MD  Primary Care Provider: Primary Doctor [facility] Medicine Team: [facility] [place] [facility] 3 Micheal Nevarez MD    Subjective:     Principal Problem:Acute on chronic respiratory failure with hypoxia    HPI:  71 y/o female with past medical history of cryptogenic pneumonia (home oxygen; respondent to steroids), HTN, HLP, Type 2 diabetes (on insulin) with polyneuropathy and CKD stage 3 and CAD was admitted to [facility] with acute hypoxic respiratory failure requiring initial hospitalization in ICU and intubated.  Patient has been transferred [facility] [place] [facility] for evaluation of acute hypoxic respiratory failure and pulmonary consult.     Patients cytogenic PNA was first diagnosed in 5/2017 at [facility]. Patient was placed on a steroid taper from 60mg to 30mg daily, continue taper 10mg every 3 days, 20mg daily.    Patient was originally admitted to [facility] on 10/7/17 with septic shock. Patient required intubation with ICU level care. She was treated with zyvox, levoquin and maxipime.  She was extubated and required intermittent BiPAP and oxygen for her respiratory failure. Cardiology had been following the patient at [facility].  Sputum culture with normal growth. Urine culture negative.  BCx X2 were NGTD. Attempts have been made to diuresis patient with IV lasix but resulted in worsening of renal function so have backed off diuresis and trying to use IV Diuril without much success. Due to this, patient was transferred [facility] [place] [facility] for higher level of care and Pulmonary.    Of note, patient has 3 vessel CAD with negative PET stress for perfusion defects done [facility] [place] [facility] so CABG was not indicated     Hospital Course:  Pt stepped down to hospital medicine and required IV  insulin infusion as blood glucose levels incredibly difficult to control. Pt now transitioned to SQ with goal glucose 140-180 (detemir 60U QHS now and aspart 27U with meals). Evaluated by cardiology for episode of chest pain and lopressor and imdur added for HR control and anti-anginal effect. Patient tolerating a diet without any further abdominal or chest pain and working with PT/OT, stable for SNF placement and to be transferred on Monday.     Interval History: NAEON. S/p EGD yesterday which showed candidiasis and gastropathy. No epigastric or chest pain. No other symptoms reported.     Review of Systems   Constitutional: Negative for chills and fever.   HENT: Negative for congestion and sore throat.    Eyes: Negative for photophobia and visual disturbance.   Respiratory: Negative for cough and shortness of breath.    Cardiovascular: Negative for chest pain, palpitations and leg swelling.   Gastrointestinal: Negative for abdominal pain, constipation, diarrhea, nausea and vomiting.   Genitourinary: Negative for decreased urine volume, dysuria and hematuria.   Musculoskeletal: Negative for arthralgias and myalgias.   Skin: Negative for rash and wound.   Neurological: Negative for syncope and headaches.   Psychiatric/Behavioral: Negative for agitation and confusion.     Objective:     Vital Signs (Most Recent):  Temp: 98.7 °F (37.1 °C) (10/25/17 0910)  Pulse: 73 (10/25/17 1500)  Resp: 19 (10/25/17 1406)  BP: (!) 121/53 (10/25/17 1406)  SpO2: 99 % (10/25/17 1406) Vital Signs (24h Range):  Temp:  [98.1 °F (36.7 °C)-98.7 °F (37.1 °C)] 98.7 °F (37.1 °C)  Pulse:  [67-75] 73  Resp:  [17-22] 19  SpO2:  [91 %-99 %] 99 %  BP: (121-144)/(53-63) 121/53     Weight: 85.5 kg (188 lb 7.9 oz)  Body mass index is 31.37 kg/m².    Intake/Output Summary (Last 24 hours) at 10/25/17 1656  Last data filed at 10/25/17 1500   Gross per 24 hour   Intake             1698 ml   Output             2050 ml   Net             -352 ml      Physical  Exam   Constitutional: She is oriented to person, place, and time. No distress.   Thin, elderly female. Not distressed. Not somnolent. A and Oriented x 3.    HENT:   Head: Normocephalic and atraumatic.   Eyes: Conjunctivae and EOM are normal.   Neck: Normal range of motion. Neck supple. No JVD present.   Cardiovascular: Normal rate and regular rhythm.    Murmur (3/6 systolic murmur best heard over parasternal area) heard.  Pulmonary/Chest: Effort normal and breath sounds normal. No respiratory distress. She has no wheezes.   Abdominal: Soft. Bowel sounds are normal. She exhibits no distension. There is no tenderness. There is no guarding.   Musculoskeletal: She exhibits no edema, tenderness or deformity.   Neurological: She is alert and oriented to person, place, and time. No cranial nerve deficit.   Skin: Skin is warm and dry. She is not diaphoretic.   Psychiatric: She has a normal mood and affect.       Significant Labs:   BMP:     Recent Labs  Lab 10/25/17  0550   *  124*     138   K 4.5  4.5     105   CO2 24  24   BUN 61*  61*   CREATININE 2.0*  2.0*   CALCIUM 8.0*  8.0*     CBC:     Recent Labs  Lab 10/24/17  0432 10/25/17  0550   WBC 35.47* 22.03*  22.03*   HGB 9.7* 8.4*  8.4*   HCT 28.3* 25.1*  25.1*   * 353*  353*     Magnesium:   No results for input(s): MG in the last 48 hours.  POCT Glucose:     Recent Labs  Lab 10/24/17  1713 10/24/17  2040 10/25/17  1405   POCTGLUCOSE 265* 190* 264*     Assessment/Plan:      * Acute on chronic respiratory failure with hypoxia    -Improved, now comfortable on NC oxygen, high 90s on room air. De saturated when sat on side of bed to mid 80s.   -Patient has been diuresed extensively, holding fluids to keep lungs dry  Appears going + but clinically requiring less oxygen  Will monitor oxygen requirements and titrate fluid status as needed  Will continue prednisone at current dosage and have pulm f/u          Type 2 diabetes mellitus with  hyperglycemia, with long-term current use of insulin    Well controlled this AM  - Given 25 units detemir BID. Will continue, appears to be dosed appropriately  -Increasing mealtime to 30 TIDWM for better control throughout day          Type 2 diabetes mellitus with stage 3 chronic kidney disease, with long-term current use of insulin    -Cr stable in low 2s.   -Suspect 2/2 aggressive diuresis.   -will closely monitor with daily BMPs          Acute on chronic diastolic heart failure    -patient had a repeat echo on 10/7/17 which showed LVEF 60-65%, PASP 70-75, moderate TR  -Venous US R LE was negative for DVT  -patient was treated at OSH with Bumex 2mg IV x1 day and albumin 1 hour prior  -cardiology was consulted; they reviewed CT and CXR images and due to these findings and bump in Cr after diuresis, they felt that the resp failure was more like PNA in etiology.    -patient was subsequently transferred here for higher level of care/pulmonary lavage  -S/p significant IV diuresis, elevated creatinine but improving. Diuresis stopped as feel patient's lung exam is secondary to chronic disease and not fluid overload.   Cr stable          Microcytic anemia    -labs consistent with iron deficiency  -continue ferrous sulfate 325  -patient required 2U transfusion at OSH  -will get CBC q daily and trend     Has steadily declined, s/p EGD yesterday with mild bleeding   S/p 1 unit PRBCs yesterday with 3 point increase in Hgb          HLD (hyperlipidemia)    -continue with crestor 20mg        Essential hypertension    on amlodipine 10mg + imdur 30, metoprolol 25 BID  Will monitor and adjust as needed.           Coronary artery disease involving native coronary artery of native heart without angina pectoris    Acute epigastric pain overnight 10/19/17  Worked up for cardiac cause with EKG and serial troponins  Appreciate cardiology recs  Starting BBlocker for goal HR 50-60  Continuing ASA, statin, Holding Plavix in setting of GI  bleed. Repeat CBC in one week and can consider restarting Plavix  Starting imdur  Will monitor BP and titrate          VTE Risk Mitigation         Ordered     Place sequential compression device  Until discontinued      10/13/17 2324     Medium Risk of VTE  Once      10/13/17 2316          Micheal Nevarez MD   Internal Medicine PGY-1  [phone]

## 2017-10-25 NOTE — ANESTHESIA POSTPROCEDURE EVALUATION
"Anesthesia Post Evaluation    Patient: Edie Hays    Procedure(s) Performed: Procedure(s) (LRB):  ESOPHAGOGASTRODUODENOSCOPY (EGD) (N/A)    Final Anesthesia Type: general  Patient location during evaluation: PACU  Patient participation: Yes- Able to Participate  Level of consciousness: awake and alert and oriented  Post-procedure vital signs: reviewed and stable  Pain management: adequate  Airway patency: patent  PONV status at discharge: No PONV  Anesthetic complications: no      Cardiovascular status: blood pressure returned to baseline  Respiratory status: unassisted, spontaneous ventilation and room air  Hydration status: euvolemic  Follow-up not needed.        Visit Vitals  BP (!) 128/56   Pulse 72   Temp 37.1 °C (98.7 °F) (Oral)   Resp 18   Ht 5' 5" (1.651 m)   Wt 85.5 kg (188 lb 7.9 oz)   LMP  (LMP Unknown)   SpO2 (!) 91%   Breastfeeding? No   BMI 31.37 kg/m²       Pain/Bishop Score: Pain Assessment Performed: Yes (10/25/2017  9:10 AM)  Presence of Pain: denies (10/25/2017  9:10 AM)  Bishop Score: 10 (10/24/2017 12:20 PM)      "

## 2017-10-25 NOTE — PLAN OF CARE
Problem: Occupational Therapy Goal  Goal: Occupational Therapy Goal  Goals to be met by: 11/08/2017    Patient will increase functional independence with ADLs by performing:    UE Dressing with Supervision.  LE Dressing with Minimal Assistance.GOAL MET 10/24/17 - keep goal in place to carry over  Grooming while seated with Supervision. GOAL MET 10/24/17  ** NEW GOAL:  Grooming while standing with SBA  Toileting from bedside commode with Minimal Assistance for hygiene and clothing management.                         -goal revised: from toilet with Moderate Assistance   Sitting at edge of bed or bedside chair  x30 minutes with Supervision.  Rolling to Bilateral with Modified [place].                      - Rolling to L side: MOD I w/ side rail MET   Supine to sit with Supervision.  Stand pivot transfers with Supervision.  Toilet transfer to bedside commode with Supervision.                            - toilet         Outcome: Ongoing (interventions implemented as appropriate)  Goals revised and remain appropriate      Jennifer Crowder OT  10/25/2017      Comments: Cont OT POC

## 2017-10-25 NOTE — ASSESSMENT & PLAN NOTE
Well controlled this AM  - Given 25 units detemir BID. Will continue, appears to be dosed appropriately  -Increasing mealtime to 30 TIDWM for better control throughout day

## 2017-10-25 NOTE — PT/OT/SLP PROGRESS
Occupational Therapy  Treatment    Edie Hays   MRN: [id number]   Admitting Diagnosis: Acute on chronic respiratory failure with hypoxia    OT Date of Treatment: 10/25/17   OT Start Time: 0950  OT Stop Time: 1021  OT Total Time (min): 31 min    Billable Minutes:  Self Care/Home Management 10 and Therapeutic Activity 21    General Precautions: Standard, fall  Orthopedic Precautions: N/A  Braces: N/A         Subjective:  Communicated with RN prior to session.  Pt agreeable to therapy today  Pain/Comfort  Pain Rating 1: 0/10  Location - Orientation 1: generalized  Location 1: head  Pain Addressed 1: Distraction, Cessation of Activity    Objective:  Patient found with: telemetry     Functional Mobility:  Bed Mobility: Activity did not occur       Transfers:   Sit <> Stand Assistance: Moderate Assistance  Sit <> Stand Assistive Device: Rolling Walker  Bed <> Chair Technique: Stand Pivot  Bed <> Chair Transfer Assistance: Moderate Assistance  Bed <> Chair Assistive Device: Rolling Walker    Functional Ambulation: Pt ambulated 5-6 steps from bedside chair to sink w/ RW and MIN A for safety     Activities of Daily Living:   Grooming Position: Standing at sink  Grooming Level of Assistance: Minimum assistance        Balance:   Static Sit: FAIR+: Able to take MINIMAL challenges from all directions  Dynamic Sit: FAIR+: Maintains balance through MINIMAL excursions of active trunk motion  Static Stand: POOR+: Needs MINIMAL assist to maintain  Dynamic stand: POOR: N/A    Therapeutic Activities and Exercises:  Pt educated on OOB activity; sit<>stand 3 trials w/ MOD A,  RW, and verbal cues for proper hand placement. Pt c/o of headache during session but no SOB reported despite appearing SOB and fatigued. Pt completed grooming standing at sink w/ MIN A to set up water, toothbrush, and toothpaste due to pt's poor activity tolerance. Pt would benefit from skilled nursing facility to further therapy and maximize function.     AM-PAC 6  "CLICK ADL   How much help from another person does this patient currently need?   1 = Unable, Total/Dependent Assistance  2 = A lot, Maximum/Moderate Assistance  3 = A little, Minimum/Contact Guard/Supervision  4 = None, Modified [place]/Independent    Putting on and taking off regular lower body clothing? : 3  Bathing (including washing, rinsing, drying)?: 2  Toileting, which includes using toilet, bedpan, or urinal? : 2  Putting on and taking off regular upper body clothing?: 3  Taking care of personal grooming such as brushing teeth?: 2  Eating meals?: 2  Total Score: 14     AM-PAC Raw Score CMS "G-Code Modifier Level of Impairment Assistance   6 % Total / Unable   7 - 8 CM 80 - 100% Maximal Assist   9-13 CL 60 - 80% Moderate Assist   14 - 19 CK 40 - 60% Moderate Assist   20 - 22 CJ 20 - 40% Minimal Assist   23 CI 1-20% SBA / CGA   24 CH 0% Independent/ Mod I       Patient left up in chair with call button in reach, RN notified and son present    ASSESSMENT:  Edie Hays is a 72 y.o. female with a medical diagnosis of Acute on chronic respiratory failure with hypoxia and presents with poor activity tolerance, endurance and balance deficits limiting overall functional performance during ADLs and functional mobility tasks. Pt continues to benefit from OT services to address the below listed impairments.    Rehab identified problem list/impairments: Rehab identified problem list/impairments: weakness, impaired endurance, gait instability, impaired functional mobilty, impaired self care skills, impaired balance, pain, decreased safety awareness, impaired cardiopulmonary response to activity    Rehab potential is fair.    Activity tolerance: Fair    Discharge recommendations: Discharge Facility/Level Of Care Needs: nursing facility, skilled     Barriers to discharge: Barriers to Discharge: Decreased caregiver support    Equipment recommendations: walker, rolling     GOALS:    Occupational Therapy Goals     "    Problem: Occupational Therapy Goal    Goal Priority Disciplines Outcome Interventions   Occupational Therapy Goal     OT, PT/OT Ongoing (interventions implemented as appropriate)    Description:  Goals to be met by: 11/08/2017    Patient will increase functional independence with ADLs by performing:    UE Dressing with Supervision.  LE Dressing with Minimal Assistance.GOAL MET 10/24/17 - keep goal in place to carry over  Grooming while seated with Supervision. GOAL MET 10/24/17  ** NEW GOAL:  Grooming while standing with SBA  Toileting from bedside commode with Minimal Assistance for hygiene and clothing management.                         -goal revised: from toilet with Moderate Assistance   Sitting at edge of bed or bedside chair  x30 minutes with Supervision.  Rolling to Bilateral with Modified [place].                      - Rolling to L side: MOD I w/ side rail MET   Supine to sit with Supervision.  Stand pivot transfers with Supervision.  Toilet transfer to bedside commode with Supervision.                            - toilet                          Plan:  Patient to be seen 4 x/week to address the above listed problems via self-care/home management, therapeutic activities, therapeutic exercises  Plan of Care expires: 11/07/17  Plan of Care reviewed with: patient, jose PETERSEN Lakesha, OT  10/25/2017

## 2017-10-26 NOTE — ASSESSMENT & PLAN NOTE
Acute epigastric pain overnight 10/19/17  Worked up for cardiac cause with EKG and serial troponins  Appreciate cardiology recs  Starting BBlocker for goal HR 50-60  Continuing ASA, statin, Holding Plavix in setting of GI bleed. Repeat CBC in one week and can consider restarting Plavix  Imdur started for management of anginal symptoms

## 2017-10-26 NOTE — ASSESSMENT & PLAN NOTE
-Improved, now comfortable on NC oxygen, high 90s on room air. De saturated when sat on side of bed to mid 80s.   -Patient has been diuresed extensively, holding fluids to keep lungs dry  Appears going + but clinically requiring less oxygen  Will continue prednisone at current dosage and have pulm f/u

## 2017-10-26 NOTE — PT/OT/SLP PROGRESS
Occupational Therapy  Treatment    Edie Hays   MRN: [id number]   Admitting Diagnosis: Acute on chronic respiratory failure with hypoxia    OT Date of Treatment: 10/26/17   OT Start Time: 0957  OT Stop Time: 1026  OT Total Time (min): 29 min    Billable Minutes:  Self Care/Home Management 10 and Therapeutic Activity 19    General Precautions: Standard, fall  Orthopedic Precautions: N/A  Braces: N/A         Subjective:  Communicated with RN prior to session.  Pt agreeable to OT session  Pain/Comfort  Pain Rating 1: 0/10  Pain Rating Post-Intervention 1: 0/10    Objective:  Patient found with: telemetry, oxygen     Functional Mobility:  Bed Mobility:  Rolling/Turning Right: Minimum assistance  Scooting/Bridging: Supervision  Supine to Sit: Minimum Assistance    Transfers:   Sit <> Stand Assistance: Minimum Assistance, Moderate Assistance  Sit <> Stand Assistive Device: Rolling Walker  Bed <> Chair Technique: Stand Pivot  Bed <> Chair Transfer Assistance: Moderate Assistance  Bed <> Chair Assistive Device: Rolling Walker    Functional Ambulation: Pt ambulated ~10 feet within room and ~16 feet in hallway w/ Rw, CGA and chair pushed behind for safety and increased fatigue. Pt required two rest breaks requiring increased break time.     Activities of Daily Living:  LE Dressing Level of Assistance: Moderate assistance      Balance:   Static Sit: FAIR: Maintains without assist, but unable to take any challenges   Dynamic Sit: FAIR: Cannot move trunk without losing balance  Static Stand: POOR+: Needs MINIMAL assist to maintain  Dynamic stand: POOR: N/A    Therapeutic Activities and Exercises:  Pt educated on OOB activity; pt continues to require increased time to complete any functional mobility tasks and ADLs. Pt appears SOB but states she is not and is just fatigued, oxygen monitored throughout session and RN notified. Pt required two seated rest breaks during ambulation requiring verbal cues to re-initiate task. Pt  "transferred sit<>stand from EOB MIN A but MOD A from to and from chair. Pt performed LB dressing tasks seated in chair MOD A to complete. Pt would benefit from placement in skilled nursing facility to further therapy and maximize function.    AM-PAC 6 CLICK ADL   How much help from another person does this patient currently need?   1 = Unable, Total/Dependent Assistance  2 = A lot, Maximum/Moderate Assistance  3 = A little, Minimum/Contact Guard/Supervision  4 = None, Modified [place]/Independent    Putting on and taking off regular lower body clothing? : 2  Bathing (including washing, rinsing, drying)?: 2  Toileting, which includes using toilet, bedpan, or urinal? : 2  Putting on and taking off regular upper body clothing?: 3  Taking care of personal grooming such as brushing teeth?: 3  Eating meals?: 3  Total Score: 15     AM-PAC Raw Score CMS "G-Code Modifier Level of Impairment Assistance   6 % Total / Unable   7 - 8 CM 80 - 100% Maximal Assist   9-13 CL 60 - 80% Moderate Assist   14 - 19 CK 40 - 60% Moderate Assist   20 - 22 CJ 20 - 40% Minimal Assist   23 CI 1-20% SBA / CGA   24 CH 0% Independent/ Mod I       Patient left up in chair with all lines intact, call button in reach and RN notified    ASSESSMENT:  Edie Hays is a 72 y.o. female with a medical diagnosis of Acute on chronic respiratory failure with hypoxia and presents with poor activity tolerance and increased fatigue during session and heavy breathing despite no c/o SOB. Pt demonstrates good BUE ROM to don back gown but demonstrated difficulty with LB dressing due to fatigue, however is able to cross legs over to facilitate task. She continues to benefit from acute OT to address the below listed impairments.     Rehab identified problem list/impairments: Rehab identified problem list/impairments: weakness, impaired endurance, impaired self care skills, impaired functional mobilty, gait instability, impaired balance, decreased safety " awareness, impaired cardiopulmonary response to activity    Rehab potential is fair.    Activity tolerance: Fair    Discharge recommendations: Discharge Facility/Level Of Care Needs: nursing facility, skilled     Barriers to discharge: Barriers to Discharge: Decreased caregiver support    Equipment recommendations: walker, rolling     GOALS:    Occupational Therapy Goals        Problem: Occupational Therapy Goal    Goal Priority Disciplines Outcome Interventions   Occupational Therapy Goal     OT, PT/OT Ongoing (interventions implemented as appropriate)    Description:  Goals to be met by: 11/08/2017    Patient will increase functional independence with ADLs by performing:    UE Dressing with Supervision.  LE Dressing with Minimal Assistance.GOAL MET 10/24/17 - keep goal in place to carry over  Grooming while seated with Supervision. GOAL MET 10/24/17  ** NEW GOAL:  Grooming while standing with SBA  Toileting from bedside commode with Minimal Assistance for hygiene and clothing management.                         -goal revised: from toilet with Moderate Assistance   Sitting at edge of bed or bedside chair  x30 minutes with Supervision.  Rolling to Bilateral with Modified [place].                      - Rolling to L side: MOD I w/ side rail MET   Supine to sit with Supervision.  Stand pivot transfers with Supervision.  Toilet transfer to bedside commode with Supervision.                            - toilet                          Plan:  Patient to be seen 4 x/week to address the above listed problems via self-care/home management, therapeutic activities, therapeutic exercises  Plan of Care expires: 11/07/17  Plan of Care reviewed with: patient, spouse         Jennifer Crowder, OT  10/26/2017

## 2017-10-26 NOTE — PLAN OF CARE
Problem: Patient Care Overview  Goal: Plan of Care Review  Outcome: Ongoing (interventions implemented as appropriate)  Pt denies Chest pain, SOB or nausea. No falls, trauma or injury noted. VSS. H/H: 8.4. Accucheck ACHS. BG management. Prednisone q 12hrs. EGD done. Plan for fluconazole po for 2 weeks. Possible discharge to SNF in am. Plan of care reviewed with patient. No further questions at this time. No significant events. Son at bedside. Will continue to monitor.

## 2017-10-26 NOTE — NURSING
Pt and son at bedside noncompliant with call light for sugar checks. Pt repeatedly eats before checking sugar for meals.  but may not be accurate due to pt snacking. Repeated education given.

## 2017-10-26 NOTE — PLAN OF CARE
10/26/17 1326   Final Note   Assessment Type Final Discharge Note   Discharge Disposition SNF  ([facility])

## 2017-10-26 NOTE — DISCHARGE SUMMARY
[facility] Hospital Medicine  Discharge Summary      Patient Name: Edie Hays  MRN: [id number]  Admission Date: 10/13/2017  Hospital Length of Stay: 13 days  Discharge Date and Time:  10/26/2017 1:56 PM  Attending Physician: Manan Friedman MD   Discharging Provider: Micheal Nevarez MD  Primary Care Provider: Primary Doctor [facility] Medicine Team: [facility] 3 Micheal Nevarez MD    HPI:   73 y/o female with past medical history of cryptogenic pneumonia (home oxygen; respondent to steroids), HTN, HLP, Type 2 diabetes (on insulin) with polyneuropathy and CKD stage 3 and CAD was admitted to [facility] with acute hypoxic respiratory failure requiring initial hospitalization in ICU and intubated.  Patient has been transferred to [facility] for evaluation of acute hypoxic respiratory failure and pulmonary consult.     Patients cytogenic PNA was first diagnosed in 5/2017 at [facility]. Patient was placed on a steroid taper from 60mg to 30mg daily, continue taper 10mg every 3 days, 20mg daily.    Patient was originally admitted to [facility] on 10/7/17 with septic shock. Patient required intubation with ICU level care. She was treated with zyvox, levoquin and maxipime.  She was extubated and required intermittent BiPAP and oxygen for her respiratory failure. Cardiology had been following the patient at [facility].  Sputum culture with normal growth. Urine culture negative.  BCx X2 were NGTD. Attempts have been made to diuresis patient with IV lasix but resulted in worsening of renal function so have backed off diuresis and trying to use IV Diuril without much success. Due to this, patient was transferred to [facility] for higher level of care and Pulmonary.    Of note, patient has 3 vessel CAD with negative PET stress for perfusion defects done here at [facility] so CABG was not indicated     Procedure(s) (LRB):  ESOPHAGOGASTRODUODENOSCOPY (EGD) (N/A)      Indwelling  Lines/Drains at time of discharge:   Lines/Drains/Airways     Peripherally Inserted Central Catheter Line                 PICC Triple Lumen 10/14/17 left brachial 12 days              Hospital Course:   Pt stepped down to hospital medicine and required IV insulin infusion as blood glucose levels incredibly difficult to control. Pt now transitioned to SQ with goal glucose 140-180. Eventually achieved with 25 units detemir BID and 27 units aspart with meals + mod correction sliding scale with minimal requirements.     Evaluated by cardiology for episode of chest pain and lopressor and imdur added for HR control and anti-anginal effect. Patient tolerating a diet without any further abdominal or chest pain and working with PT/OT.    Patient had 1 episode of epigastric pain. Due to a gradual decline in Hgb GI was consulted and EGD was performed. Candidiasis and gastropathy found. Started on fluconazole and recommended to have H. Pylori testing as outpatient. Plavix held due to bleeding.     Patient's respiratory status remained stable throughout her floor stay and she was tolerating room air at rest but desaturated when sitting up on the side of the bed. Prednisone was not tapered and she will need to follow up with pulmonary for tapering recommendations.      Review of Systems   Constitutional: Negative for chills and fever.   HENT: Negative for congestion and sore throat.    Eyes: Negative for photophobia and visual disturbance.   Respiratory: Negative for cough and shortness of breath.    Cardiovascular: Negative for chest pain, palpitations and leg swelling.   Gastrointestinal: Negative for abdominal pain, constipation, diarrhea, nausea and vomiting.   Genitourinary: Negative for decreased urine volume, dysuria and hematuria.   Musculoskeletal: Negative for arthralgias and myalgias.   Skin: Negative for rash and wound.   Neurological: Negative for syncope and headaches.   Psychiatric/Behavioral: Negative for  agitation and confusion.      Objective:      Vitals:    10/26/17 0400   BP: 118/52   Pulse: 83   Resp: 17   Temp:        Physical Exam   Constitutional: She is oriented to person, place, and time. No distress.   Thin, elderly female. Not distressed. A and Oriented x 3.    HENT:   Head: Normocephalic and atraumatic.   Eyes: Conjunctivae and EOM are normal.   Neck: Normal range of motion. Neck supple. No JVD present.   Cardiovascular: Normal rate and regular rhythm.    Murmur (3/6 systolic murmur best heard over parasternal area) heard.  Pulmonary/Chest: Effort normal and breath sounds normal. No respiratory distress. She has no wheezes.   Abdominal: Soft. Bowel sounds are normal. She exhibits no distension. There is no tenderness. There is no guarding.   Musculoskeletal: She exhibits no edema, tenderness or deformity.   Neurological: She is alert and oriented to person, place, and time. No cranial nerve deficit.   Skin: Skin is warm and dry. She is not diaphoretic.   Psychiatric: She has a normal mood and affect.     Consults:   Consults         Status Ordering Provider     Inpatient consult to Cardiology  Once     Provider:  (Not yet assigned)    Completed KENNETH NICOLE     Inpatient consult to Critical Care Medicine  Once     Provider:  (Not yet assigned)    Completed CARLEY JORDAN     Inpatient consult to Gastroenterology  Once     Provider:  (Not yet assigned)    Completed ABHISHEK HUERTA     Inpatient consult to Infectious Diseases  Once     Provider:  (Not yet assigned)    Completed KHAN, BEHRAM     Inpatient consult to Infectious Diseases  Once     Provider:  (Not yet assigned)    Completed KHAN, BEHRAM     Inpatient consult to Pulmonology  Once     Provider:  (Not yet assigned)    Completed JUICE WILKES     IP consult to dietary  Once     Provider:  (Not yet assigned)    Completed JOSE ANTONIO SAM          Significant Diagnostic Studies: Labs:   BMP:   Recent Labs  Lab  10/25/17  0550 10/26/17  0536   *  124* 175*     138 138   K 4.5  4.5 4.4     105 106   CO2 24  24 24   BUN 61*  61* 63*   CREATININE 2.0*  2.0* 1.9*   CALCIUM 8.0*  8.0* 8.0*   , CMP   Recent Labs  Lab 10/25/17  0550 10/26/17  0536     138 138   K 4.5  4.5 4.4     105 106   CO2 24  24 24   *  124* 175*   BUN 61*  61* 63*   CREATININE 2.0*  2.0* 1.9*   CALCIUM 8.0*  8.0* 8.0*   ANIONGAP 9  9 8   ESTGFRAFRICA 28.5*  28.5* 30.4*   EGFRNONAA 24.8*  24.8* 26.3*    and CBC   Recent Labs  Lab 10/25/17  0550 10/26/17  0536   WBC 22.03*  22.03* 22.48*   HGB 8.4*  8.4* 8.1*   HCT 25.1*  25.1* 23.9*   *  353* 320     Endoscopy:  Upper GI endoscopy  Indications:          Melena  Providers:            Ajay Stovall MD, Annmarie Adam LPN, Delaney Zhu RN, Juan Titus CRNA  Referring MD:         Neil Israel  Complications:        No immediate complications.  Medicines:            Propofol per Anesthesia  Procedure:            Pre-Anesthesia Assessment:                        - Prior to the procedure, a History and Physical                         was performed, and patient medications and                         allergies were reviewed. The patient's tolerance of                         previous anesthesia was also reviewed. The risks                         and benefits of the procedure and the sedation                         options and risks were discussed with the patient.                         All questions were answered, and informed consent                         was obtained. Prior Anticoagulants: The patient has                         taken no previous anticoagulant or antiplatelet                         agents. ASA Grade Assessment: per anesthesia. After                         reviewing the risks and benefits, the patient was                         deemed in satisfactory condition to undergo the                          procedure.                        - Prior to the procedure, a History and Physical                         was performed, and patient medications and                         allergies were reviewed. The patient's tolerance of                         previous anesthesia was also reviewed. The risks                         and benefits of the procedure and the sedation                         options and risks were discussed with the patient.                         All questions were answered, and informed consent                         was obtained. Prior Anticoagulants: The patient has                         taken no previous anticoagulant or antiplatelet                         agents. ASA Grade Assessment: per anesthesia. After                         reviewing the risks and benefits, the patient was                         deemed in satisfactory condition to undergo the                         procedure.                        After obtaining informed consent, the endoscope was                         passed under direct vision. Throughout the                         procedure, the patient's blood pressure, pulse, and                         oxygen saturations were monitored continuously. The                         Olympus scope GIF- ([id number]) was introduced                         through the mouth, and advanced to the second part                         of duodenum. The upper GI endoscopy was                         accomplished without difficulty. The patient                         tolerated the procedure well.  Findings:       Diffuse candidiasis was found in the middle third of the esophagus.       A few localized, small non-bleeding erosions were found in the        gastric antrum. There were stigmata of recent bleeding.       The cardia and gastric fundus were normal on retroflexion.       The examined duodenum was normal.  Impression:           - Monilial esophagitis.                         - Recently bleeding erosive gastropathy.                        - Normal examined duodenum.                        - No specimens collected.  Recommendation:       - Return patient to hospital hair for ongoing care.                        - Perform an H. pylori serology at appointment to                         be scheduled.                        - Diflucan (fluconazole) 100 mg PO daily for 2                         weeks.                        - The findings and recommendations were discussed                         with the referring physician.                        - Use a proton pump inhibitor PO daily indefinitely.    Pending Diagnostic Studies:     Procedure Component Value Units Date/Time    Basic metabolic panel [[id number]] Collected:  10/24/17 0440    Order Status:  Sent Lab Status:  In process Updated:  10/24/17 0441    Specimen:  Blood from Blood     Basic metabolic panel [[id number]] Collected:  10/20/17 0158    Order Status:  Sent Lab Status:  In process Updated:  10/20/17 0158    Specimen:  Blood from Blood     CBC auto differential [[id number]] Collected:  10/24/17 0440    Order Status:  Sent Lab Status:  In process Updated:  10/24/17 0441    Specimen:  Blood from Blood     RESPIRATORY PATHOGENS PANEL (TEM-PCR) Nasopharyngeal Swab [[id number]] Collected:  10/14/17 1227    Order Status:  Sent Lab Status:  In process Updated:  10/14/17 1227        Final Active Diagnoses:    Diagnosis Date Noted POA    PRINCIPAL PROBLEM:  Acute on chronic respiratory failure with hypoxia [J96.21] 10/13/2017 Yes    Melena [K92.1] 10/24/2017 Yes    Type 2 diabetes mellitus with hyperglycemia, with long-term current use of insulin [E11.65, Z79.4] 10/15/2017 Not Applicable    Acute on chronic diastolic heart failure [I50.33] 10/13/2017 Yes    Type 2 diabetes mellitus with stage 3 chronic kidney disease, with long-term current use of insulin [E11.22, N18.3, Z79.4] 10/13/2017 Not Applicable    Essential  hypertension [I10] 05/31/2017 Yes    HLD (hyperlipidemia) [E78.5] 05/31/2017 Yes    Coronary artery disease involving native coronary artery of native heart without angina pectoris [I25.10] 05/30/2017 Yes    Microcytic anemia [D50.9] 04/29/2017 Yes      Problems Resolved During this Admission:    Diagnosis Date Noted Date Resolved POA    Anginal chest pain at rest [I20.8] 10/20/2017 10/24/2017 No    Urinary retention [R33.9] 10/19/2017 10/24/2017 No    Hyperglycemic hyperosmolar nonketotic coma [E11.01] 10/19/2017 10/20/2017 No    Acute metabolic encephalopathy [G93.41] 10/15/2017 10/18/2017 Yes    Acute respiratory failure with hypoxia [J96.01] 10/13/2017 10/14/2017 Yes    Diabetic neuropathy [E11.40] 05/31/2017 10/13/2017 Yes      * Acute on chronic respiratory failure with hypoxia    -Improved, now comfortable on NC oxygen, high 90s on room air. De saturated when sat on side of bed to mid 80s.   -Patient has been diuresed extensively, holding fluids to keep lungs dry  Appears going + but clinically requiring less oxygen  Will continue prednisone at current dosage and have pulm f/u          Type 2 diabetes mellitus with hyperglycemia, with long-term current use of insulin    Well controlled this AM  - Given 25 units detemir BID. Will continue, appears to be dosed appropriately  -Increasing mealtime to 30 TIDWM for better control throughout day          Type 2 diabetes mellitus with stage 3 chronic kidney disease, with long-term current use of insulin    -Cr initial elevation due to aggressive diuresis.   -Improved to 1.9 today.   -F/u as outpatient          Acute on chronic diastolic heart failure    -patient had a repeat echo on 10/7/17 which showed LVEF 60-65%, PASP 70-75, moderate TR  -Venous US R LE was negative for DVT  -patient was treated at OSH with Bumex 2mg IV x1 day and albumin 1 hour prior  -cardiology was consulted; they reviewed CT and CXR images and due to these findings and bump in Cr  after diuresis, they felt that the resp failure was more like PNA in etiology.    -patient was subsequently transferred here for higher level of care/pulmonary lavage  -S/p significant IV diuresis, elevated creatinine but improving. Diuresis stopped as feel patient's lung exam is secondary to chronic disease and not fluid overload.   Cr stable          Microcytic anemia    -labs consistent with iron deficiency  -continue ferrous sulfate 325  -patient required 2U transfusion at OSH  -will get CBC q daily and trend     Steadily declined inpatient, s/p EGD with mild bleeding   S/p 1 unit PRBCs with good response        HLD (hyperlipidemia)    -continue with crestor 20mg        Essential hypertension    on amlodipine 10mg + imdur 30, metoprolol 25 BID          Coronary artery disease involving native coronary artery of native heart without angina pectoris    Acute epigastric pain overnight 10/19/17  Worked up for cardiac cause with EKG and serial troponins  Appreciate cardiology recs  Starting BBlocker for goal HR 50-60  Continuing ASA, statin, Holding Plavix in setting of GI bleed. Repeat CBC in one week and can consider restarting Plavix  Imdur started for management of anginal symptoms              Discharged Condition: stable    Disposition: Skilled Nursing Facility    Follow Up:    Patient Instructions:     Diet general     Activity as tolerated       Medications:  Reconciled Home Medications:   Current Discharge Medication List      START taking these medications    Details   fluconazole (DIFLUCAN) 100 MG tablet Take 1 tablet (100 mg total) by mouth once daily.  Qty: 13 tablet, Refills: 0      !! insulin aspart (NOVOLOG) 100 unit/mL InPn pen Inject 27 Units into the skin 3 (three) times daily.  Qty: 24.3 mL, Refills: 11      !! insulin aspart (NOVOLOG) 100 unit/mL InPn pen Inject 30 Units into the skin 3 (three) times daily with meals.  Refills: 0      !! insulin detemir (LEVEMIR FLEXTOUCH) 100 unit/mL (3 mL) SubQ  InPn pen Inject 25 Units into the skin 2 (two) times daily.  Qty: 15 mL, Refills: 11      !! insulin detemir (LEVEMIR FLEXTOUCH) 100 unit/mL (3 mL) SubQ InPn pen Inject 25 Units into the skin 2 (two) times daily.  Refills: 0      isosorbide mononitrate (IMDUR) 30 MG 24 hr tablet Take 1 tablet (30 mg total) by mouth once daily.  Qty: 30 tablet, Refills: 11      metoprolol tartrate (LOPRESSOR) 25 MG tablet Take 1 tablet (25 mg total) by mouth 2 (two) times daily.  Qty: 60 tablet, Refills: 11      pantoprazole (PROTONIX) 40 MG tablet Take 1 tablet (40 mg total) by mouth once daily.  Qty: 30 tablet, Refills: 11       !! - Potential duplicate medications found. Please discuss with provider.      CONTINUE these medications which have CHANGED    Details   predniSONE (DELTASONE) 20 MG tablet Take 3 tablets (60 mg total) by mouth every 12 (twelve) hours.  Qty: 120 tablet, Refills: 0         CONTINUE these medications which have NOT CHANGED    Details   amlodipine (NORVASC) 10 MG tablet Take 10 mg by mouth once daily.      buPROPion (WELLBUTRIN SR) 100 MG TBSR 12 hr tablet Take 100 mg by mouth 2 (two) times daily.      clopidogrel (PLAVIX) 75 mg tablet Take 1 tablet (75 mg total) by mouth once daily.  Qty: 30 tablet, Refills: 11      duloxetine (CYMBALTA) 60 MG capsule Take 60 mg by mouth once daily.      ferrous sulfate 325 (65 FE) MG EC tablet Take 325 mg by mouth once daily.      furosemide (LASIX) 20 MG tablet Take 20 mg by mouth 2 (two) times daily.      gabapentin (NEURONTIN) 300 MG capsule Take 300 mg by mouth 3 (three) times daily.      nitroGLYCERIN (NITROSTAT) 0.4 MG SL tablet Place 1 tablet (0.4 mg total) under the tongue every 5 (five) minutes as needed for Chest pain.  Qty: 25 tablet, Refills: 3      rosuvastatin (CRESTOR) 20 MG tablet Take 1 tablet (20 mg total) by mouth every evening.  Qty: 90 tablet, Refills: 1      aspirin (ECOTRIN) 81 MG EC tablet Take 81 mg by mouth once daily.         STOP taking these  medications       carvedilol (COREG) 12.5 MG tablet Comments:   Reason for Stopping:         insulin aspart (NOVOLOG) 100 unit/mL injection Comments:   Reason for Stopping:         insulin glargine (LANTUS) 100 unit/mL injection Comments:   Reason for Stopping:             Time spent on the discharge of patient: 30 minutes      Micheal Nevarez MD   Internal Medicine PGY-1  [phone]

## 2017-10-26 NOTE — PLAN OF CARE
Problem: Occupational Therapy Goal  Goal: Occupational Therapy Goal  Goals to be met by: 11/08/2017    Patient will increase functional independence with ADLs by performing:    UE Dressing with Supervision.  LE Dressing with Minimal Assistance.GOAL MET 10/24/17 - keep goal in place to carry over  Grooming while seated with Supervision. GOAL MET 10/24/17  ** NEW GOAL:  Grooming while standing with SBA  Toileting from bedside commode with Minimal Assistance for hygiene and clothing management.                         -goal revised: from toilet with Moderate Assistance   Sitting at edge of bed or bedside chair  x30 minutes with Supervision.  Rolling to Bilateral with Modified [place].                      - Rolling to L side: MOD I w/ side rail MET   Supine to sit with Supervision.  Stand pivot transfers with Supervision.  Toilet transfer to bedside commode with Supervision.                            - toilet         Outcome: Ongoing (interventions implemented as appropriate)  Goals remain appropriate    Comments: Cont OT POC

## 2017-10-26 NOTE — PLAN OF CARE
JEFFREY spoke to [facility] with [facility].  They have received auth from [employer] and they have reviewed the [facility] orders. Pt can transfer.    NUMBER FOR REPORT ([phone])    JEFFREY called [employer] ([phone]) and arranged for a WC van with 3L O2 NC , to transport pt to [facility] at 2pm.  Transport person said arrival should be between 2p/230p.  JEFFREY spoke to RN and confirmed plans.      Bernarda Martell LCSW     [phone]  Critical Care (MICU)

## 2017-10-26 NOTE — ASSESSMENT & PLAN NOTE
-Cr initial elevation due to aggressive diuresis.   -Improved to 1.9 today.   -F/u as outpatient

## 2017-10-26 NOTE — ASSESSMENT & PLAN NOTE
-labs consistent with iron deficiency  -continue ferrous sulfate 325  -patient required 2U transfusion at OSH  -will get CBC q daily and trend     Steadily declined inpatient, s/p EGD with mild bleeding   S/p 1 unit PRBCs with good response

## 2017-10-26 NOTE — PT/OT/SLP PROGRESS
"Physical Therapy  Treatment    Edie Hays   MRN: [id number]   Admitting Diagnosis: Acute on chronic respiratory failure with hypoxia    PT Received On: 10/26/17  PT Start Time: 0957     PT Stop Time: 1026    PT Total Time (min): 29 min (Co-tx with OT)      Billable Minutes:  Gait [id number] min and Therapeutic Activity 10 min    Treatment Type: Treatment  PT/PTA: PT     PTA Visit Number: 0       General Precautions: Standard, fall  Orthopedic Precautions: N/A   Braces: N/A    Do you have any cultural, spiritual, Sikh conflicts, given your current situation?: none reported     Subjective:  Communicated with RN prior to session. Pt agreeable to participate in therapy session. Pt reported feeling "tired."     Pain/Comfort  Pain Rating 1: 0/10  Pain Rating Post-Intervention 1: 0/10    Objective:   Patient found with: telemetry, oxygen    Functional Mobility:  Bed Mobility:   Supine to Sit: Minimum Assistance    Transfers:  Sit <> Stand Assistance: Minimum Assistance, Moderate Assistance (min A from EOB x 1 trial; mod A from bedside chair x 2 trials )  Sit <> Stand Assistive Device: Rolling Walker    Gait:   Gait Distance: 10 ft.+ 16 ft. with seated rest break between trials; no LOB; pt with SOB upon exertion, chair follow for safety  Assistance 1: Contact Guard Assistance, With assist of two (assist of 2 for chair follow)  Gait Assistive Device: Rolling walker  Gait Pattern: reciprocal  Gait Deviation(s): decreased nayeli, decreased step length, decreased stride length, decreased weight-shifting ability     Balance:   Static Sit: FAIR+: Able to take MINIMAL challenges from all directions  Dynamic Sit: FAIR+: Maintains balance through MINIMAL excursions of active trunk motion  Static Stand: FAIR: Maintains without assist but unable to take challenges  Dynamic stand: FAIR: Needs CONTACT GUARD during gait     Therapeutic Activities and Exercises:  Therapeutic activities aimed to:  - increase pt's independence, safety, " and efficiency with functional transfers. See above for assistance levels. Several trials of sit<>stand performed from EOB and bedside chair. Pt required skilled cueing for hand placement and body mechanics.   - improve endurance/standing tolerance     Therapist facilitated progression of gait training to improve gait stability, endurance, and independence with functional ambulation. Pt required increased time for gait and all functional mobility tasks 2/2 fatigue and SOB. Pt required skilled cueing to improve standing posture, RW management and safety awareness during transfers.       AM-PAC 6 CLICK MOBILITY  How much help from another person does this patient currently need?   1 = Unable, Total/Dependent Assistance  2 = A lot, Maximum/Moderate Assistance  3 = A little, Minimum/Contact Guard/Supervision  4 = None, Modified [place]/Independent    Turning over in bed (including adjusting bedclothes, sheets and blankets)?: 2  Sitting down on and standing up from a chair with arms (e.g., wheelchair, bedside commode, etc.): 2  Moving from lying on back to sitting on the side of the bed?: 2  Moving to and from a bed to a chair (including a wheelchair)?: 3  Need to walk in hospital room?: 3  Climbing 3-5 steps with a railing?: 1  Total Score: 13    AM-PAC Raw Score CMS G-Code Modifier Level of Impairment Assistance   6 % Total / Unable   7 - 9 CM 80 - 100% Maximal Assist   10 - 14 CL 60 - 80% Moderate Assist   15 - 19 CK 40 - 60% Moderate Assist   20 - 22 CJ 20 - 40% Minimal Assist   23 CI 1-20% SBA / CGA   24 CH 0% Independent/ Mod I     Patient left up in chair with all lines intact, call button in reach and RN notified.    Assessment:  Edie Hays is a 72 y.o. female with a medical diagnosis of Acute on chronic respiratory failure with hypoxia. Pt continues to demonstrate SOB upon exertion and requires increased time to complete functional mobility tasks 2/2 endurance deficits. Pt would benefit from  continued PT intervention to address below listed deficits and maximize return to PLOF. Recommending pt discharge to SNF for continued therapy and  care.     Rehab identified problem list/impairments: Rehab identified problem list/impairments: weakness, impaired endurance, impaired self care skills, impaired functional mobilty, gait instability, impaired balance, decreased lower extremity function, decreased safety awareness, impaired cardiopulmonary response to activity    Rehab potential is good.    Activity tolerance: Good    Discharge recommendations: Discharge Facility/Level Of Care Needs: nursing facility, skilled     Barriers to discharge: Barriers to Discharge: Decreased caregiver support    Equipment recommendations: Equipment Needed After Discharge: walker, rolling     GOALS:    Physical Therapy Goals     Not on file          Multidisciplinary Problems (Resolved)        Problem: Physical Therapy Goal    Goal Priority Disciplines Outcome Goal Variances Interventions   Physical Therapy Goal   (Resolved)     PT/OT, PT Outcome(s) achieved     Description:  Goals to be met by: 17    Patient will increase functional independence with mobility by performin. Supine to sit with MInimal Assistance - met 10/21       Revised: Supine <>sit with stand by assistance   2. Sit to stand transfer with Moderate Assistance - met 10/21       Revised: Sit>stand transfer with stand by assistance   3. Gait  x 50 feet with Moderate Assistance using Rolling Walker.   4. Sitting at edge of bed x8 minutes with Minimal Assistance - met 10/19  5. Stand for 5 minutes with Minimal Assistance using Rolling Walker. - met 10/19  6. Lower extremity exercise program x15 reps per handout, with independence - met 10/21                                 PLAN:    Patient to be seen 5 x/week  to address the above listed problems via gait training, therapeutic activities, therapeutic exercises, neuromuscular re-education  Plan of Care  expires: 11/18/17  Plan of Care reviewed with: patient        Shakila Ezra PT, DPT   10/26/2017  Pager: [phone]

## 2017-10-27 PROBLEM — K92.0 HEMATEMESIS WITHOUT NAUSEA: Status: ACTIVE | Noted: 2017-01-01

## 2017-10-27 NOTE — ED NOTES
2 units of blood started to IV. Attempting to obtain second IV. Pulse of 22 obtained. CPR in progress

## 2017-10-27 NOTE — ED NOTES
Jose says there is no blood in GI tract per scope. Pulmonary pages. Attempting to obtain blood pressure. Dopamine drip increased to 4

## 2017-10-27 NOTE — ED PROVIDER NOTES
SCRIBE #1 NOTE: I, Corinne Mack, madelaine scribing for, and in the presence of, Willa Montes De Oca MD. I have scribed the entire note.      History      Chief Complaint   Patient presents with    Cardiac Arrest       Review of patient's allergies indicates:   Allergen Reactions    Codeine     Lortab [hydrocodone-acetaminophen] Nausea Only    Penicillins         HPI   HPI    10/27/2017, 8:33 AM   [facility]      History of Present Illness: Edie Hays is a 72 y.o. female patient who presents to the Emergency Department for cardiac arrest which onset suddenly at 7:15 AM. Symptoms are constant and severe. Pt arrived in the ED with CPR in progress at 8:11 AM with no pulse or respiration. Bennie tube in place. Pt arrived at [facility] at 6:30 PM last night via taxi. Per the nursing home, the pt fell face first this morning. The nursing home sat her up and the pt was stable. When the nursing home staff checked on her later the pt was in respiratory distress and there was blood around the pt indicative of the pt coughing up blood. Pt was agonal and coded shortly after [facility] arrival. [facility] was able to regain pulse intermittently. Pt's CBG was 168. Pt was given 6 epi en route and shocked once for VTACH. Pt was discharged from [facility] yesterday after a 13 day admission. Pt had an endoscope performed 10/24/17. Pt has Hx of breast cancer, chronic heart failure, HTN, CAD, MI, ESRF, and DM. No further complaints or concerns at this time. HPI limited due to acuity of pt condition.        Arrival mode: [facility]    PCP: Primary Doctor No       Past Medical History:  Past Medical History:   Diagnosis Date    BOOP (bronchiolitis obliterans with organizing pneumonia) 5/2/2017    Overview:  Diagnosed clinically via response to steroids    Breast cancer     Chronic diastolic heart failure     Coronary artery disease involving native coronary artery of native heart without angina pectoris  5/30/2017    Encounter for blood transfusion     Essential hypertension 5/31/2017    HLD (hyperlipidemia) 5/31/2017    NSTEMI (non-ST elevated myocardial infarction) 5/31/2017    Type 2 diabetes mellitus with diabetic polyneuropathy, with long-term current use of insulin 5/31/2017    Last Assessment & Plan:  History of type 2 diabetes managed on basal bolus insulin at home.  She is currently receiving Lantus 35 units HS and Humalog 5 units with meals.  Blood glucose is stable ranging between 167-200.  We will continue to monitor as she is still receiving steroids making her glucose control difficult.This morning, patient had an accucheck documented at 60 which improved after o    Type 2 diabetes mellitus with stage 3 chronic kidney disease, with long-term current use of insulin 10/13/2017       Past Surgical History:  Past Surgical History:   Procedure Laterality Date    MASTECTOMY           Family History:  History reviewed. No pertinent family history.    Social History:  Social History     Social History Main Topics    Smoking status: Former Smoker    Smokeless tobacco: Never Used      Comment: quit in the 1980's    Alcohol use No    Drug use: No    Sexual activity: Not on file       ROS   Review of Systems   Unable to perform ROS: Acuity of condition     Physical Exam      Initial Vitals [10/27/17 0827]   BP Pulse Resp Temp SpO2   (!) 94/39 (!) 40 18 -- --      MAP       57.33          Physical Exam  Nursing Notes and Vital Signs Reviewed.  Vital signs and nursing notes reviewed.  Constitutional: Patient is intubated with a bennie tube. Unresponsive. CPR in progress  Head: Normocephalic and atraumatic.   Nose and Throat: Large amount of blood noted to the posterior pharynx. Bennie tube in place.  Eyes: Pupils are fixed and dilated.  Neck: Supple. No cervical lymphadenopathy.  Cardiovascular: Pulse is absent. PEA. No heart sounds  Pulmonary/Chest: Respiration is absent. R mastectomy.  Abdominal: Old  surgical scarring noted to the abdomen. Large scar(possible chronic hematoma) to the LLQ. Moderate distention  Musculoskeletal: AV fistula noted to the L arm with no thrill.  Skin: Cool and dry.      ED Course    Central Line  Date/Time: 10/27/2017 9:02 AM  Performed by: MADDY BOSWELL  Consent Done: Emergent Situation  Indications: med administration and vascular access  Anesthesia: see MAR for details  Preparation: skin prepped with ChloraPrep  Skin prep agent dried: skin prep agent completely dried prior to procedure  Sterile barriers: all five maximum sterile barriers used - cap, mask, sterile gown, sterile gloves, and large sterile sheet  Hand hygiene: hand hygiene performed prior to central venous catheter insertion  Location details: left femoral  Catheter type: triple lumen  Catheter size: 7 Fr  Ultrasound guidance: yes  Number of attempts: 1  Specimens: No  Implants: No  Post-procedure: line sutured  Complications: No  Comments: Pt     Critical Care  Date/Time: 10/27/2017 9:41 AM  Performed by: MADDY BOSWELL  Authorized by: MADDY BOSWELL   Direct patient critical care time: 58 minutes  Additional history critical care time: 5 minutes  Ordering / reviewing critical care time: 10 minutes  Documentation critical care time: 10 minutes  Consulting other physicians critical care time: 20 minutes  Consult with family critical care time: 5 minutes  Total critical care time (exclusive of procedural time) : 108 minutes  Critical care time was exclusive of separately billable procedures and treating other patients and teaching time.  Critical care was necessary to treat or prevent imminent or life-threatening deterioration of the following conditions: cardiac failure, respiratory failure and shock (uncontrolled internal bleeding).  Critical care was time spent personally by me on the following activities: review of old charts, re-evaluation of patient's condition, pulse oximetry, ordering and  review of radiographic studies, ordering and review of laboratory studies, ordering and performing treatments and interventions, obtaining history from patient or surrogate, examination of patient, evaluation of patient's response to treatment, interpretation of cardiac output measurements, discussions with consultants and development of treatment plan with patient or surrogate.        ED Vital Signs:  Vitals:    10/27/17 0827 10/27/17 0831 10/27/17 0848 10/27/17 0853   BP: (!) 94/39 (!) 73/38 (!) 73/40 (!) 124/44   Pulse: (!) 40 (!) 40 (!) 29    Resp: 18 17 13    SpO2:   (!) 77% 98%   Weight:        10/27/17 0859 10/27/17 0903 10/27/17 0906 10/27/17 0920   BP:  (!) 101/32 (!) 90/33 (!) 148/53   Pulse: (!) 43 (!) 35 (!) 33 (!) 39   Resp: (!) 0 (!) 7 (!) 0 18   SpO2: (!) 87% (!) 87% (!) 83% (!) 90%   Weight:        10/27/17 0924 10/27/17 0928 10/27/17 0931 10/27/17 0936   BP: (!) 100/44 (!) 93/52     Pulse: (!) 38 (!) 32 (!) 36    Resp: 18 16 (!) 30    SpO2: (!) 93% 96% 99%    Weight:    100 kg (220 lb 7.4 oz)       Abnormal Lab Results:  Labs Reviewed   ISTAT PROCEDURE - Abnormal; Notable for the following:        Result Value    POC PH 6.872 (*)     POC PCO2 67.6 (*)     POC PO2 51 (*)     POC HCO3 12.4 (*)     POC SATURATED O2 57 (*)     All other components within normal limits   ISTAT PROCEDURE - Abnormal; Notable for the following:     POC PH 6.907 (*)     POC PCO2 82.9 (*)     POC PO2 45 (*)     POC HCO3 16.5 (*)     POC SATURATED O2 49 (*)     POC Glucose 345 (*)     POC Potassium 5.7 (*)     POC Ionized Calcium 1.50 (*)     POC Hematocrit 17 (*)     All other components within normal limits   CBC W/ AUTO DIFFERENTIAL   COMPREHENSIVE METABOLIC PANEL   MAGNESIUM   CK   TROPONIN I   URINALYSIS   PROTIME-INR   APTT   LACTIC ACID, PLASMA   B-TYPE NATRIURETIC PEPTIDE   TYPE & SCREEN   PREPARE PLATELETS (DOSE) SOFT   PREPARE FRESH FROZEN PLASMA SOFT   PREPARE CRYOPRECIPITATE (DOSE) SOFT   PREPARE RBC SOFT         All Lab Results:  Results for orders placed or performed during the hospital encounter of 10/27/17   ISTAT PROCEDURE   Result Value Ref Range    POC PH 6.872 (LL) 7.35 - 7.45    POC PCO2 67.6 (HH) 35 - 45 mmHg    POC PO2 51 (LL) 80 - 100 mmHg    POC HCO3 12.4 (L) 24 - 28 mmol/L    POC BE -21 -2 to 2 mmol/L    POC SATURATED O2 57 (L) 95 - 100 %    Rate 20     Sample ARTERIAL     Site RR     Allens Test Pass     DelSys Adult Vent     Mode AC/PRVC     Vt 5     FiO2 100    ISTAT PROCEDURE   Result Value Ref Range    POC PH 6.907 (LL) 7.35 - 7.45    POC PCO2 82.9 (HH) 35 - 45 mmHg    POC PO2 45 (LL) 80 - 100 mmHg    POC HCO3 16.5 (L) 24 - 28 mmol/L    POC BE -16 -2 to 2 mmol/L    POC SATURATED O2 49 (L) 95 - 100 %    POC Glucose 345 (H) 70 - 110 mg/dL    POC Sodium 145 136 - 145 mmol/L    POC Potassium 5.7 (H) 3.5 - 5.1 mmol/L    POC Ionized Calcium 1.50 (H) 1.06 - 1.42 mmol/L    POC Hematocrit 17 (LL) 36 - 54 %PCV    Sample ARTERIAL     Site Sang/UAC     Allens Test N/A        Imaging Results:  Imaging Results    None        The EKG was ordered, reviewed, and independently interpreted by the ED provider.  Interpretation time: 0830  Rate: 39 BPM  Rhythm: sinus bradycardia with AV dissociation and Idioventricular rhythm  Interpretation: R bundle branch block. No STEMI.           The Emergency Provider reviewed the vital signs and test results, which are outlined above.    ED Discussion     8:11 AM: Pt arrived in ED with compression in progress and Bennie tube in place. No pulse is present.    8:23 AM: Pulse has been regained. Cardiac activity is confirmed on US. Pt's O2 stats are 100%.    8:30 AM: Dr. Serrano (Anesthesiology) is at the bedside.    8:31 AM: Dr. Montes De Oca discussed the pt's case with Dr. Garcia (Gastroenterology) is not in house, but is on her way.    8:36 AM: Pulse still present.    8:38 AM: Blood transfusion has been started. Pulse is 32. CPR is in progress.    8:40 AM: Pt source of bleeding is identified as  coming from the GI tract.     8:43 AM: Pt intubated by Dr. Serrano (Anesthesiologist).     8:49 AM: No pulse detected. CPR in progress.    8:52 AM: Positive cardiac activity    9:00 AM: Dr. Garcia (Gastroenterology) is at pt bedside.    9:01 AM: Pulse detected.    9:02 AM: Arterial line placed by Dr. Serrano (Anesthesiology). Central line placed by Dr. Montes De Oca (Emergency Medicine).    9:16 AM: Endo arrived at bedside.    9:25 AM: Dr. Montes De Oca discussed the pt's case with Dr. Marcus (Cardiology) who is coming to the bedside.    9:26 AM: No pulse detected.    9:29 AM: Dr. Marcus (Cardiology) is at pt bedside.    9:33 AM: Pt stable enough for endo to take over for now and perform an endoscopy.    9:44 AM: Dr. Montes De Oca discussed the pt's case with Dr. Ho (Pulmonology), he is at bedside    9:46 AM: Pulse is not detected. ACLS initiated    9:59 AM: Pt .      ED Medication(s):  Medications   vasopressin (PITRESSIN) 20 unit/mL injection (not administered)   vasopressin injection 40 Units (not administered)   DOPamine 400 mg in dextrose 5 % 250 mL infusion (premix) (not administered)   pantoprazole injection 80 mg (not administered)   pantoprazole 40 mg in dextrose 5 % 100 mL infusion (ready to mix system) (not administered)   0.9%  NaCl infusion (for blood administration) (not administered)   0.9%  NaCl infusion (for blood administration) (not administered)   0.9%  NaCl infusion (for blood administration) (not administered)   0.9%  NaCl infusion (for blood administration) (not administered)       New Prescriptions    No medications on file             Medical Decision Making    Medical Decision Making:   Clinical Tests:   Lab Tests: Ordered and Reviewed  Radiological Study: Ordered  Medical Tests: Ordered and Reviewed           Scribe Attestation:   Scribe #1: I performed the above scribed service and the documentation accurately describes the services I performed. I attest to the accuracy of the note.    Attending:   Physician  Attestation Statement for Scribe #1: I, Willa Montes De Oca MD, personally performed the services described in this documentation, as scribed by Corinne Mack, in my presence, and it is both accurate and complete.          Clinical Impression       ICD-10-CM ICD-9-CM   1. Hematemesis, presence of nausea not specified K92.0 578.0   2. Cardiac Arrest  3. Respiratory Arrest    Disposition:   Disposition:          Willa Montes De Oca MD  10/27/17 1443       Willa Montes De Oca MD  10/27/17 1444

## 2017-10-27 NOTE — ASSESSMENT & PLAN NOTE
Massive hematemesis- critically ill and unstable; limited note because of critical illness  -Still needs aggressive resuscitation  -Plan for emergent EGD  -Discussed with mother

## 2017-10-27 NOTE — ED NOTES
Calcium Chloride given via IV. I L normal saline started to left IV. Anesthesia at bedside to attempt to intubate

## 2017-10-27 NOTE — ED NOTES
Tay with [facility]  released body.  Fax number [phone] -asking to fax the whole ER record once its completed

## 2017-10-27 NOTE — SUBJECTIVE & OBJECTIVE
Past Medical History:   Diagnosis Date    BOOP (bronchiolitis obliterans with organizing pneumonia) 5/2/2017    Overview:  Diagnosed clinically via response to steroids    Breast cancer     Chronic diastolic heart failure     Coronary artery disease involving native coronary artery of native heart without angina pectoris 5/30/2017    Encounter for blood transfusion     Essential hypertension 5/31/2017    HLD (hyperlipidemia) 5/31/2017    NSTEMI (non-ST elevated myocardial infarction) 5/31/2017    Type 2 diabetes mellitus with diabetic polyneuropathy, with long-term current use of insulin 5/31/2017    Last Assessment & Plan:  History of type 2 diabetes managed on basal bolus insulin at home.  She is currently receiving Lantus 35 units HS and Humalog 5 units with meals.  Blood glucose is stable ranging between 167-200.  We will continue to monitor as she is still receiving steroids making her glucose control difficult.This morning, patient had an accucheck documented at 60 which improved after o    Type 2 diabetes mellitus with stage 3 chronic kidney disease, with long-term current use of insulin 10/13/2017       Past Surgical History:   Procedure Laterality Date    MASTECTOMY         Review of patient's allergies indicates:   Allergen Reactions    Codeine     Lortab [hydrocodone-acetaminophen] Nausea Only    Penicillins      Family History     None        Social History Main Topics    Smoking status: Former Smoker    Smokeless tobacco: Never Used      Comment: quit in the 1980's    Alcohol use No    Drug use: No    Sexual activity: Not on file     Review of Systems   Unable to perform ROS: Patient unresponsive     Objective:     Vital Signs (Most Recent):  Pulse: (!) 33 (10/27/17 0906)  Resp: (!) 0 (10/27/17 0906)  BP: (!) 90/33 (10/27/17 0906)  SpO2: (!) 83 % (10/27/17 0906) Vital Signs (24h Range):  Pulse:  [29-78] 33  Resp:  [0-27] 0  SpO2:  [77 %-98 %] 83 %  BP: ()/(32-56) 90/33         There is no height or weight on file to calculate BMI.    No intake or output data in the 24 hours ending 10/27/17 0916    Lines/Drains/Airways     Peripherally Inserted Central Catheter Line                 PICC Triple Lumen 10/14/17 left brachial 13 days          Intraosseous Line                 Intraosseous Line Tibia -- days          Peripheral Intravenous Line                 Peripheral IV - Single Lumen 10/27/17 0815 Right Antecubital less than 1 day                Physical Exam   Constitutional: No distress.   Intubated   HENT:   Mouth/Throat: No oropharyngeal exudate.   Blood over face and coming out of mouth   Eyes: Right eye exhibits no discharge. Left eye exhibits no discharge. No scleral icterus.   Cardiovascular:   paced   Pulmonary/Chest: No respiratory distress. She has no wheezes.   Coarse BS   Abdominal: Soft. She exhibits no distension. There is no tenderness.   Musculoskeletal: She exhibits edema.   Neurological:   Nonresponsive   Vitals reviewed.      N/A- labs and imaging not available patient critical and being aggressively resuscitated.

## 2017-10-27 NOTE — ED TRIAGE NOTES
Pt from nursing home- per [facility]- she fell face first and when nursing home rolled her over she was gasping for air. When [facility] arrived she was agonal and had no pulse and nursing home workers were not doing CPR. Pt arrived to ER with CPR in progress with bonny tube in placed- patient being bagged. Respiratory and Dr. Montes De Oca at bedside.

## 2017-10-27 NOTE — ED NOTES
Pacing at 80 beats/min at 200mil/amps. Dr. Marcus with Cards at bedside. Dopamine drip started at 2 mcg/min

## 2017-10-27 NOTE — ED NOTES
IV to Right AC blown. 1 mg Epi given via IO. Anesthesia attempting to place art line to right upper extremity

## 2017-10-27 NOTE — ED NOTES
Pt received a total of 6 epi in route along with 1 shock when she went into VTACH. There is a dialysis shunt to LUE. Large hematoma to LLE noted. Abdomen distended and hard. Right masectomy noted

## 2017-10-27 NOTE — ED NOTES
No pulse detected. CPR started. DR. Montes De Oca and Dr. Garcia and DR. Ho at bedside. 1 mg Epi given via cental line

## 2017-10-27 NOTE — ED NOTES
Unable to feel pulse- pulse of 38 obtained via arterial line. Sodium Bicarb and 1 mg atropine given via central line

## 2017-10-27 NOTE — ED NOTES
Dr Garcia speaking with mother, Aliza Smith. Mother informed of situation and states patient's daughter is on her way here.

## 2017-10-27 NOTE — ED NOTES
maxed out dopamine drip to 20mcg per Dr. Montes De Oca. 1 mg atropine given via central line. 2 unit of FFP started

## 2017-10-27 NOTE — HPI
72F w/ complex history including but not limited to CAD on Plavix and ASA, recent admission for hypoxic respiratory failure with unclear dx of chronic PNA. Patient was seen at a critical time as she arrived in cardiac arrest and requiring aggressive resuscitation for what is presumed is a massive GIB. She has been intubated and is having persistent hematemesis with hemodynamic instability.     Recent hospital visit was concerned with anemia from GIB but EGD was done and was unremarkable.    10/24/2017 EGD      - Monilial esophagitis.                        - Recently bleeding erosive gastropathy.                        - Normal examined duodenum.                        - No specimens collected.        History is per chart unable to contact family member.

## 2017-10-27 NOTE — ED NOTES
Attempting to find pulse- unable to feel pulse- 30 pulse detected via arterial line per anesthesia

## 2017-10-27 NOTE — PROGRESS NOTES
[facility] -   Gastroenterology  Progress Note    Patient Name: Edie Hays  MRN: [id number]  Admission Date: 10/27/2017  Hospital Length of Stay: 0 days  Code Status: Prior   Attending Provider: Willa Montes De Oca MD  Consulting Provider: Silva Garcia MD  Primary Care Physician: Primary Doctor No  Principal Problem: <principal problem not specified>    72F w/ complex history including but not limited to CAD on Plavix and ASA, recent admission for hypoxic respiratory failure with unclear dx of chronic PNA. Patient was seen at a critical time as she arrived in cardiac arrest and requiring aggressive resuscitation for what is presumed is a massive GIB. She has been intubated and is having persistent hematemesis with hemodynamic instability.      Recent hospital visit was concerned with anemia from GIB but EGD was done and was unremarkable.     10/24/2017 EGD       - Monilial esophagitis.                        - Recently bleeding erosive gastropathy.                        - Normal examined duodenum.                        - No specimens collected.         History is per chart unable to contact family member.          Past Medical History:   Diagnosis Date    BOOP (bronchiolitis obliterans with organizing pneumonia) 5/2/2017    Overview:  Diagnosed clinically via response to steroids    Breast cancer     Chronic diastolic heart failure     Coronary artery disease involving native coronary artery of native heart without angina pectoris 5/30/2017    Encounter for blood transfusion     Essential hypertension 5/31/2017    HLD (hyperlipidemia) 5/31/2017    NSTEMI (non-ST elevated myocardial infarction) 5/31/2017    Type 2 diabetes mellitus with diabetic polyneuropathy, with long-term current use of insulin 5/31/2017    Last Assessment & Plan:  History of type 2 diabetes managed on basal bolus insulin at home.  She is currently receiving Lantus 35 units HS and Humalog 5 units with meals.  Blood glucose  is stable ranging between 167-200.  We will continue to monitor as she is still receiving steroids making her glucose control difficult.This morning, patient had an accucheck documented at 62 which improved after o    Type 2 diabetes mellitus with stage 3 chronic kidney disease, with long-term current use of insulin 10/13/2017       Past Surgical History:   Procedure Laterality Date    MASTECTOMY         Review of patient's allergies indicates:   Allergen Reactions    Codeine     Lortab [hydrocodone-acetaminophen] Nausea Only    Penicillins      Family History     None        Social History Main Topics    Smoking status: Former Smoker    Smokeless tobacco: Never Used      Comment: quit in the 1980's    Alcohol use No    Drug use: No    Sexual activity: Not on file     Review of Systems   Unable to perform ROS: Patient unresponsive     Objective:     Vital Signs (Most Recent):  Pulse: (!) 33 (10/27/17 0906)  Resp: (!) 0 (10/27/17 0906)  BP: (!) 90/33 (10/27/17 0906)  SpO2: (!) 83 % (10/27/17 0906) Vital Signs (24h Range):  Pulse:  [29-78] 33  Resp:  [0-27] 0  SpO2:  [77 %-98 %] 83 %  BP: ()/(32-56) 90/33        There is no height or weight on file to calculate BMI.    No intake or output data in the 24 hours ending 10/27/17 0916    Lines/Drains/Airways     Peripherally Inserted Central Catheter Line                 PICC Triple Lumen 10/14/17 left brachial 13 days          Intraosseous Line                 Intraosseous Line Tibia -- days          Peripheral Intravenous Line                 Peripheral IV - Single Lumen 10/27/17 0815 Right Antecubital less than 1 day                Physical Exam   Constitutional: No distress.   Intubated   HENT:   Mouth/Throat: No oropharyngeal exudate.   Blood over face and coming out of mouth   Eyes: Right eye exhibits no discharge. Left eye exhibits no discharge. No scleral icterus.   Cardiovascular:   paced   Pulmonary/Chest: No respiratory distress. She has no  wheezes.   Coarse BS   Abdominal: Soft. She exhibits no distension. There is no tenderness.   Musculoskeletal: She exhibits edema.   Neurological:   Nonresponsive   Vitals reviewed.      N/A- labs and imaging not available patient critical and being aggressively resuscitated.    Assessment/Plan:     Hematemesis without nausea    Massive hematemesis- critically ill and unstable; limited note because of critical illness  -Still needs aggressive resuscitation  -Plan for emergent EGD  -Discussed with mother              Thank you for your consult. I will follow-up with patient. Please contact us if you have any additional questions.    Silva Garcia MD  Gastroenterology  [facility] [place]

## 2017-10-27 NOTE — CONSULTS
Consult Note  Cardiology    Consult Requested By: Dr. Yon Montes De Oca  Reason for Consult: Bradycardia post cardiac arrest     SUBJECTIVE:     History of Present Illness:  Patient is a 72 y.o. female with PMHx cryptogenic pneumonia (home oxygen; respondent to steroids), HTN, HLP, Type 2 diabetes (on insulin) with polyneuropathy and CKD stage 3 and CAD presented to the ED this AM after cardiac arrest requiring CPR. She was discharged yesterday after a prolonged hosp course for pneumonia, heart failure exac, GI bleed requiring EGD but ultimately sent to SNF. Pt reported to have fallen at SNF and fell on face and had bloody emesis or cough spraying blood per reports. Pt came to ED agonal, intubated, multiple rounds of CPR for VT and needed external pacing. She was stablized and then underwent emergent EGD which did not reveal any GI source of bleed.  Pt ultimately coded again and  after procedure.     Review of patient's allergies indicates:   Allergen Reactions    Codeine     Lortab [hydrocodone-acetaminophen] Nausea Only    Penicillins        Past Medical History:   Diagnosis Date    BOOP (bronchiolitis obliterans with organizing pneumonia) 2017    Overview:  Diagnosed clinically via response to steroids    Breast cancer     Chronic diastolic heart failure     Coronary artery disease involving native coronary artery of native heart without angina pectoris 2017    Encounter for blood transfusion     Essential hypertension 2017    HLD (hyperlipidemia) 2017    NSTEMI (non-ST elevated myocardial infarction) 2017    Type 2 diabetes mellitus with diabetic polyneuropathy, with long-term current use of insulin 2017    Last Assessment & Plan:  History of type 2 diabetes managed on basal bolus insulin at home.  She is currently receiving Lantus 35 units HS and Humalog 5 units with meals.  Blood glucose is stable ranging between 167-200.  We will continue to monitor as she is still  receiving steroids making her glucose control difficult.This morning, patient had an accucheck documented at 62 which improved after o    Type 2 diabetes mellitus with stage 3 chronic kidney disease, with long-term current use of insulin 10/13/2017     Past Surgical History:   Procedure Laterality Date    MASTECTOMY       History reviewed. No pertinent family history.  Social History   Substance Use Topics    Smoking status: Former Smoker    Smokeless tobacco: Never Used      Comment: quit in the 1980's    Alcohol use No        Review of Systems: UTO  OBJECTIVE:     Vital Signs (Most Recent)  Pulse: (!) 36 (10/27/17 0931)  Resp: (!) 30 (10/27/17 0931)  BP: (!) 93/52 (10/27/17 0928)  SpO2: 99 % (10/27/17 0931)    Vital Signs Range (Last 24H):  Pulse:  [29-78]   Resp:  [0-30]   BP: ()/(32-56)   SpO2:  [77 %-99 %]   Arterial Line BP: ()/(37-48)     Current Facility-Administered Medications   Medication    DOPamine 400 mg in dextrose 5 % 250 mL infusion (premix)    pantoprazole 40 mg in dextrose 5 % 100 mL infusion (ready to mix system)    pantoprazole injection 80 mg    vasopressin (PITRESSIN) 20 unit/mL injection    vasopressin injection 40 Units     Current Outpatient Prescriptions   Medication Sig    amlodipine (NORVASC) 10 MG tablet Take 10 mg by mouth once daily.    aspirin (ECOTRIN) 81 MG EC tablet Take 81 mg by mouth once daily.    buPROPion (WELLBUTRIN SR) 100 MG TBSR 12 hr tablet Take 100 mg by mouth 2 (two) times daily.    clopidogrel (PLAVIX) 75 mg tablet Take 1 tablet (75 mg total) by mouth once daily.    duloxetine (CYMBALTA) 60 MG capsule Take 60 mg by mouth once daily.    ferrous sulfate 325 (65 FE) MG EC tablet Take 325 mg by mouth once daily.    fluconazole (DIFLUCAN) 100 MG tablet Take 1 tablet (100 mg total) by mouth once daily.    furosemide (LASIX) 20 MG tablet Take 20 mg by mouth 2 (two) times daily.    gabapentin (NEURONTIN) 300 MG capsule Take 300 mg by mouth 3  (three) times daily.    insulin aspart (NOVOLOG) 100 unit/mL InPn pen Inject 27 Units into the skin 3 (three) times daily.    insulin aspart (NOVOLOG) 100 unit/mL InPn pen Inject 30 Units into the skin 3 (three) times daily with meals.    insulin detemir (LEVEMIR FLEXTOUCH) 100 unit/mL (3 mL) SubQ InPn pen Inject 25 Units into the skin 2 (two) times daily.    insulin detemir (LEVEMIR FLEXTOUCH) 100 unit/mL (3 mL) SubQ InPn pen Inject 25 Units into the skin 2 (two) times daily.    isosorbide mononitrate (IMDUR) 30 MG 24 hr tablet Take 1 tablet (30 mg total) by mouth once daily.    metoprolol tartrate (LOPRESSOR) 25 MG tablet Take 1 tablet (25 mg total) by mouth 2 (two) times daily.    nitroGLYCERIN (NITROSTAT) 0.4 MG SL tablet Place 1 tablet (0.4 mg total) under the tongue every 5 (five) minutes as needed for Chest pain.    pantoprazole (PROTONIX) 40 MG tablet Take 1 tablet (40 mg total) by mouth once daily.    predniSONE (DELTASONE) 20 MG tablet Take 3 tablets (60 mg total) by mouth every 12 (twelve) hours.    rosuvastatin (CRESTOR) 20 MG tablet Take 1 tablet (20 mg total) by mouth every evening.     Current Facility-Administered Medications on File Prior to Encounter   Medication    [DISCONTINUED] 0.9%  NaCl infusion (for blood administration)    [DISCONTINUED] albuterol-ipratropium 2.5mg-0.5mg/3mL nebulizer solution 3 mL    [DISCONTINUED] aluminum-magnesium hydroxide-simethicone 200-200-20 mg/5 mL suspension 30 mL    [DISCONTINUED] amlodipine tablet 10 mg    [DISCONTINUED] buPROPion tablet 100 mg    [DISCONTINUED] dextrose 50% injection 12.5 g    [DISCONTINUED] dextrose 50% injection 12.5 g    [DISCONTINUED] dextrose 50% injection 25 g    [DISCONTINUED] dextrose 50% injection 25 g    [DISCONTINUED] duloxetine DR capsule 60 mg    [DISCONTINUED] ferrous sulfate EC tablet 325 mg    [DISCONTINUED] fluconazole tablet 100 mg    [DISCONTINUED] glucagon (human recombinant) injection 1 mg     [DISCONTINUED] glucagon (human recombinant) injection 1 mg    [DISCONTINUED] glucose chewable tablet 16 g    [DISCONTINUED] glucose chewable tablet 24 g    [DISCONTINUED] insulin aspart pen 1-10 Units    [DISCONTINUED] insulin aspart pen 30 Units    [DISCONTINUED] insulin detemir pen 25 Units    [DISCONTINUED] isosorbide mononitrate 24 hr tablet 30 mg    [DISCONTINUED] metoprolol tartrate (LOPRESSOR) tablet 25 mg    [DISCONTINUED] nitroGLYCERIN SL tablet 0.4 mg    [DISCONTINUED] ondansetron injection 4 mg    [DISCONTINUED] pantoprazole EC tablet 40 mg    [DISCONTINUED] polyethylene glycol packet 17 g    [DISCONTINUED] predniSONE tablet 60 mg    [DISCONTINUED] rosuvastatin tablet 20 mg    [DISCONTINUED] sodium chloride 0.9% flush 3 mL    [DISCONTINUED] sodium chloride 0.9% flush 5 mL     Current Outpatient Prescriptions on File Prior to Encounter   Medication Sig    amlodipine (NORVASC) 10 MG tablet Take 10 mg by mouth once daily.    aspirin (ECOTRIN) 81 MG EC tablet Take 81 mg by mouth once daily.    buPROPion (WELLBUTRIN SR) 100 MG TBSR 12 hr tablet Take 100 mg by mouth 2 (two) times daily.    clopidogrel (PLAVIX) 75 mg tablet Take 1 tablet (75 mg total) by mouth once daily.    duloxetine (CYMBALTA) 60 MG capsule Take 60 mg by mouth once daily.    ferrous sulfate 325 (65 FE) MG EC tablet Take 325 mg by mouth once daily.    fluconazole (DIFLUCAN) 100 MG tablet Take 1 tablet (100 mg total) by mouth once daily.    furosemide (LASIX) 20 MG tablet Take 20 mg by mouth 2 (two) times daily.    gabapentin (NEURONTIN) 300 MG capsule Take 300 mg by mouth 3 (three) times daily.    insulin aspart (NOVOLOG) 100 unit/mL InPn pen Inject 27 Units into the skin 3 (three) times daily.    insulin aspart (NOVOLOG) 100 unit/mL InPn pen Inject 30 Units into the skin 3 (three) times daily with meals.    insulin detemir (LEVEMIR FLEXTOUCH) 100 unit/mL (3 mL) SubQ InPn pen Inject 25 Units into the skin 2 (two)  times daily.    insulin detemir (LEVEMIR FLEXTOUCH) 100 unit/mL (3 mL) SubQ InPn pen Inject 25 Units into the skin 2 (two) times daily.    isosorbide mononitrate (IMDUR) 30 MG 24 hr tablet Take 1 tablet (30 mg total) by mouth once daily.    metoprolol tartrate (LOPRESSOR) 25 MG tablet Take 1 tablet (25 mg total) by mouth 2 (two) times daily.    nitroGLYCERIN (NITROSTAT) 0.4 MG SL tablet Place 1 tablet (0.4 mg total) under the tongue every 5 (five) minutes as needed for Chest pain.    pantoprazole (PROTONIX) 40 MG tablet Take 1 tablet (40 mg total) by mouth once daily.    predniSONE (DELTASONE) 20 MG tablet Take 3 tablets (60 mg total) by mouth every 12 (twelve) hours.    rosuvastatin (CRESTOR) 20 MG tablet Take 1 tablet (20 mg total) by mouth every evening.       Physical Exam:  General appearance: intubated, obtunded  Lungs:  Coarse BS  Heart: distant heart sounds, bradycardic  Abdomen: soft, absent bowel sounds  Extremities: extremities normal, atraumatic, no cyanosis or edema  Neurologic: obtunded    Laboratory:  Chemistry:   Lab Results   Component Value Date     10/26/2017    K 4.4 10/26/2017     10/26/2017    CO2 24 10/26/2017    BUN 63 (H) 10/26/2017    CREATININE 1.9 (H) 10/26/2017    CALCIUM 8.0 (L) 10/26/2017     Cardiac Markers:   Lab Results   Component Value Date    TROPONINI 0.117 (H) 10/20/2017     Cardiac Markers (Last 3):   Lab Results   Component Value Date    TROPONINI 0.117 (H) 10/20/2017    TROPONINI 0.111 (H) 10/20/2017    TROPONINI 0.119 (H) 10/19/2017     CBC:   Lab Results   Component Value Date    WBC 22.48 (H) 10/26/2017    HGB 8.1 (L) 10/26/2017    HCT 17 (LL) 10/27/2017    MCV 78 (L) 10/26/2017     10/26/2017     Lipids:   Lab Results   Component Value Date    CHOL 160 05/31/2017    TRIG 87 05/31/2017    HDL 70 05/31/2017     Coagulation:   Lab Results   Component Value Date    INR 1.2 10/14/2017    APTT 25.3 10/14/2017       Diagnostic Results:    CT:  Reviewed    ASSESSMENT/PLAN:     Patient Active Problem List   Diagnosis    Coronary artery disease involving native coronary artery of native heart without angina pectoris    Essential hypertension    HLD (hyperlipidemia)    Microcytic anemia    Acute on chronic diastolic heart failure    Type 2 diabetes mellitus with stage 3 chronic kidney disease, with long-term current use of insulin    Acute on chronic respiratory failure with hypoxia    Type 2 diabetes mellitus with hyperglycemia, with long-term current use of insulin    Melena    Hematemesis without nausea      Patient is a 72 y.o. female with PMHx cryptogenic pneumonia (home oxygen; respondent to steroids), HTN, HLP, Type 2 diabetes (on insulin) with polyneuropathy and CKD stage 3 and CAD presented to the ED this AM after cardiac arrest requiring CPR.     Plan:     1. Cardiac arrest - s/p CPR  -pt ultimately  in ED after EGD did not reveal any source of bleeding  - she was started on dopamine and externally paced for hemodynamic support  - consider autopsy if family desires to locate source of bleeding     Yvon Marcus MD  Cardiovascular Disease  [facility] [place]  [phone] (P)

## 2017-10-29 NOTE — PT/OT/SLP DISCHARGE
Physical Therapy Discharge Summary    Edie Hays  MRN: [id number]   Acute on chronic respiratory failure with hypoxia   Patient Discharged from acute Physical Therapy on 10/26/17.  Please refer to prior PT noted date on 10/26/17 for functional status.     Assessment:   Patient has not met goals.  GOALS:    Physical Therapy Goals     Not on file          Multidisciplinary Problems (Resolved)        Problem: Physical Therapy Goal    Goal Priority Disciplines Outcome Goal Variances Interventions   Physical Therapy Goal   (Resolved)     PT/OT, PT Outcome(s) achieved     Description:  Goals to be met by: 17    Patient will increase functional independence with mobility by performin. Supine to sit with MInimal Assistance - met 10/21       Revised: Supine <>sit with stand by assistance   2. Sit to stand transfer with Moderate Assistance - met 10/21       Revised: Sit>stand transfer with stand by assistance   3. Gait  x 50 feet with Moderate Assistance using Rolling Walker.   4. Sitting at edge of bed x8 minutes with Minimal Assistance - met 10/19  5. Stand for 5 minutes with Minimal Assistance using Rolling Walker. - met 10/19  6. Lower extremity exercise program x15 reps per handout, with independence - met 10/21                               Reasons for Discontinuation of Therapy Services  Transfer to alternate level of care.      Plan:  Patient Discharged to: Skilled Nursing Facility.    Shakila Munguia, PT, DPT   10/29/2017  Pager: [phone]

## 2017-10-31 NOTE — PHYSICIAN QUERY
PT Name: Edie Hays  MR #: [id number]     Physician Query Form - Documentation Clarification      CDS/: Mariposa Paredes RN  CCDS               Contact information: [email or web address]    This form is a permanent document in the medical record.     Query Date: October 31, 2017    By submitting this query, we are merely seeking further clarification of documentation. Please utilize your independent clinical judgment when addressing the question(s) below.    The Medical record reflects the following:    Supporting Clinical Findings Location in Medical Record    71yo woman with CKD3, CAD, Dm2, HFpEF and history of COPD on home oxygen who presents as transfer from outside hospital with acute hypoxemic respiratory failure   Type 2 diabetes mellitus with stage 3 chronic kidney disease -patients baseline Cr appears to be around 1.7   -will closely monitor with daily CMPs    Patient was unable to talk due to BiPAP and resp distress      Type 2 diabetes mellitus with hyperglycemia, with long-term current use of insulin -worsening hyperglycemia likely 2/2 due to IV steroids. Blood glucose elevated in AM given D5 gtt for hypernatremia, will D/C D5 at this point and continue free water flushes. Continue insulin gtt as well.   Neurological: She appears lethargic. She is disoriented     Glucose remains difficult to control with glc in 400s despite increasing insulin dose.  Sosm elevated at 348, but large part of this is due to BUN >100 from tube feeds and prednisone. Will start insulin gtt.   Type 2 diabetes mellitus with hyperglycemia     Hyperglycemic hyperosmolar nonketotic coma - Date Resolved: 10/20/17  POA - NO  Psychiatric/Behavioral: Negative for agitation and confusion.  She is oriented to person, place, and time. No distress  H&P 10/14                     CC Med progress note 10/17                 Hosp Med progress note 10/19               Hosp Med progress note 10/20    Hyperglycemic hyperosmolar nonketotic  coma   Date noted: 10/19/17  Date resolved:  10/20/17  POA - NO  Discharge Summary                                                                          Doctor, please specify diagnosis or diagnoses associated with above clinical findings.    Please clarify the hyperglycemic hyperosmolar nonketotic coma diagnosis:    Provider Use Only      [ x ]  Hyperglycemic hyperosmolar nonketotic coma was ruled out      [  ]  Hyperglycemic hyperosmolar nonketotic coma is a valid diagnosis, and was present on admission      [  ]  Hyperglycemic hyperosmolar nonketotic coma is a valid diagnosis, and was not present on admission      [  ]  Clinically undetermined

## 2017-11-03 LAB — POCT GLUCOSE: 260 MG/DL (ref 70–110)

## 2018-01-01 NOTE — PLAN OF CARE
Problem: Patient Care Overview  Goal: Plan of Care Review  Outcome: Ongoing (interventions implemented as appropriate)  Pt remained stable throughout the night. No acute distress noted. Pt remained free from injury. VSS. Pt denies any chest pain or SOB. Blood sugar checks q4h. SHAN PICC. 2L NC. SNF placement. Pt understands plan of care. Will continue to monitor.        Hypoglycemia guideline

## 2018-10-04 NOTE — PROGRESS NOTES
"[facility]  Infectious Disease  Progress Note    Patient Name: Edie Hays  MRN: [id number]  Admission Date: 10/13/2017  Length of Stay: 4 days  Attending Physician: Miguel A Campbell MD  Primary Care Provider: Primary Doctor No    Isolation Status: No active isolations  Assessment/Plan:      Acute respiratory failure with hypoxia-resolved as of 10/14/2017    71 yo female with PMH of cryptogenic pneumonia on home oxygen, essential HTN, HLP, Type 2 diabetes, CKD stage 3, and known 3 vessel CAD on whom ID is consulted for possible infectious component to her resp failure. By reports her outside cxs were negative and had no real response to antibiotics. She is afebrile and has a leukocytosis but no left shift or bands; otherwise no changes in clinical condition since yesterday, no obvious source of infection.     -based on the above information it is fairly unlikely that infection has cause her worsening  - stop ceftriaxone and monitor off antibiotics  - leukocytosis most likely steroid related              Anticipated Disposition: monitor off antibiotics    Thank you for your consult. I will sign off. Please contact us if you have any additional questions.    Aston Villagomez MD  Infectious Disease Fellow, PGY-5  Pager: [phone]  [facility]    Subjective:     Principal Problem:Acute on chronic diastolic heart failure    HPI: Patient is on high flow NC and not able to talk due to acuity of illness    Hx is from chart    "71 yo female with PMH of cryptogenic pneumonia on home oxygen, essential HTN, HLP, Type 2 diabetes, CKD stage 3, and known 3 vessel CAD was admitted to [facility] with acute hypoxic respiratory failure requiring initial hospitalization in ICU and intubated. Patient now extubated but still requiring intermittent BiPAP and oxygen for her respiratory failure. Attempts have been made to diuresis patient with IV lasix but resulted in worsening of " "renal function so have backed off diuresis and trying to use IV Diuril without much success. At this point due of not improvement in respiratory status request has been made for transfer to higher level of care and for Pulmonary evaluation due top patient's history of cytogenic pneumonia (BOOP) which responded to steroids in past to look for other possibilities for cause of continued respiratory issues besides just cardiac"    Sputum cx was negative at outside hospital and she was treated with linezolid, levaquin, and cefepime.     Here she has been placed on CTX.  Interval History: no overnight events    Review of Systems  Objective:     Vital Signs (Most Recent):  Temp: 98.1 °F (36.7 °C) (10/17/17 1500)  Pulse: 94 (10/17/17 1607)  Resp: (!) 23 (10/17/17 1607)  BP: (!) 124/56 (10/17/17 1500)  SpO2: 97 % (10/17/17 1607) Vital Signs (24h Range):  Temp:  [97.4 °F (36.3 °C)-98.2 °F (36.8 °C)] 98.1 °F (36.7 °C)  Pulse:  [] 94  Resp:  [13-33] 23  SpO2:  [92 %-100 %] 97 %  BP: ()/() 124/56     Weight: 85.5 kg (188 lb 7.9 oz)  Body mass index is 31.37 kg/m².    Estimated Creatinine Clearance: 22.6 mL/min (based on SCr of 2.5 mg/dL (H)).    Physical Exam   Constitutional: No distress.   Cardiovascular: Normal rate and regular rhythm.    No murmur heard.  Pulmonary/Chest: No respiratory distress. She has rales.   Abdominal: Soft. She exhibits no distension. There is no tenderness. There is no rebound.   Musculoskeletal: She exhibits no edema.   Lymphadenopathy:     She has no cervical adenopathy.   Neurological: She is alert. She is not disoriented.   Skin: No rash noted.       Significant Labs:   Blood Culture:   Recent Labs  Lab 10/14/17  0018 10/14/17  0511   LABBLOO No Growth to date  No Growth to date  No Growth to date  No Growth to date No Growth to date  No Growth to date  No Growth to date  No Growth to date     BMP:   Recent Labs  Lab 10/16/17  1600  10/17/17  1216   *  < > 126*   NA " 158*  < > 152*   K 3.8  < > 3.2*   *  < > 108   CO2 32*  < > 27   BUN 86*  < > 95*   CREATININE 2.4*  < > 2.5*   CALCIUM 10.4  < > 9.6   MG 2.7*  --   --    < > = values in this interval not displayed.  CBC:   Recent Labs  Lab 10/16/17  0328 10/17/17  0636   WBC 22.02* 21.80*   HGB 9.6* 8.7*   HCT 29.4* 28.1*    199     CMP:   Recent Labs  Lab 10/16/17  0328  10/17/17  0234 10/17/17  0800 10/17/17  1216   *  < > 146* 145 152*   K 4.1  < > 4.0 3.6 3.2*   *  < > 102 102 108   CO2 30*  < > 26 29 27   *  < > 549* 558* 126*   BUN 74*  < > 91* 92* 95*   CREATININE 2.1*  < > 2.6* 2.5* 2.5*   CALCIUM 9.8  < > 9.4 9.3 9.6   PROT 7.9  --  7.3  --   --    ALBUMIN 3.0*  --  3.0*  --   --    BILITOT 0.9  --  0.7  --   --    ALKPHOS 291*  --  290*  --   --    AST 49*  --  73*  --   --    ALT 36  --  52*  --   --    ANIONGAP 14  < > 18* 14 17*   EGFRNONAA 23.3*  < > 18.0* 18.9* 18.9*   < > = values in this interval not displayed.    Significant Imaging: I have reviewed all pertinent imaging results/findings within the past 24 hours.     Quality 130: Documentation Of Current Medications In The Medical Record: Current Medications Documented Quality 431: Preventive Care And Screening: Unhealthy Alcohol Use - Screening: Patient screened for unhealthy alcohol use using a single question and scores less than 2 times per year Quality 131: Pain Assessment And Follow-Up: Pain assessment using a standardized tool is documented as negative, no follow-up plan required Detail Level: Detailed Quality 226: Preventive Care And Screening: Tobacco Use: Screening And Cessation Intervention: Patient screened for tobacco and never smoked

## 2018-10-27 NOTE — PLAN OF CARE
Problem: Occupational Therapy Goal  Goal: Occupational Therapy Goal  Goals to be met by: 7 days 6/9/17     Patient will increase functional independence with ADLs by performing:    UE Dressing with Supervision.  LE Dressing with Supervision.  Grooming while standing with Supervision.  Toileting from toilet with Supervision for hygiene and clothing management.   Stand pivot transfers with Supervision.  Toilet transfer to toilet with Supervision.    Goals and POC established today.        parents and ED attending

## 2023-07-13 NOTE — ASSESSMENT & PLAN NOTE
-continue amlodipine 10mg and carvedilol 12.5mg BID     Patient walking in the common area. Patient was able to take PM and AM medication on time. Patient remain disorganized with flight of idea. No period of agitation, all needs attend by staff, safett maintained.
